# Patient Record
Sex: MALE | Race: BLACK OR AFRICAN AMERICAN | NOT HISPANIC OR LATINO | Employment: OTHER | URBAN - METROPOLITAN AREA
[De-identification: names, ages, dates, MRNs, and addresses within clinical notes are randomized per-mention and may not be internally consistent; named-entity substitution may affect disease eponyms.]

---

## 2017-01-01 ENCOUNTER — NURSE TRIAGE (OUTPATIENT)
Dept: ADMINISTRATIVE | Facility: CLINIC | Age: 67
End: 2017-01-01

## 2017-01-02 NOTE — TELEPHONE ENCOUNTER
"    Reason for Disposition   BP  < 120/80  (all triage questions negative)    Answer Assessment - Initial Assessment Questions  1. BLOOD PRESSURE: "What is the blood pressure?" "Did you take at least two measurements 5 minutes apart?"      /70s last 4-5 times BP checked   2. ONSET: "When did you take your blood pressure?"      10 mins ago   3. HOW: "How did you obtain the blood pressure?" (e.g., visiting nurse, automatic home BP monitor)      Home cuff   4. HISTORY: "Do you have a history of high blood pressure?"      Yes   5. MEDICATIONS: "Are you taking any medications for blood pressure?" "Have you missed any doses recently?"      Told not to med if top number >160   6. OTHER SYMPTOMS: "Do you have any symptoms?" (e.g., headache, chest pain, blurred vision, difficulty breathing, weakness)      No CP, no blurred vision, no HA, no SOB    Protocols used: ST HIGH BLOOD PRESSURE-A-      "

## 2017-01-17 ENCOUNTER — TELEPHONE (OUTPATIENT)
Dept: FAMILY MEDICINE | Facility: CLINIC | Age: 67
End: 2017-01-17

## 2017-01-17 NOTE — TELEPHONE ENCOUNTER
----- Message from Tomasz Garnett sent at 1/16/2017 12:53 PM CST -----  Contact: [phone]/self  Pt requesting to speak with the nurse.  Please advise

## 2017-01-17 NOTE — TELEPHONE ENCOUNTER
----- Message from Isadora Quiñones sent at 1/17/2017 11:13 AM CST -----  Contact: self/[phone]  Patient is returning your call.  Please advise

## 2017-01-17 NOTE — TELEPHONE ENCOUNTER
Returned patients call. Patient stated he has been having a bad cough. He is feeling better because he was able to cough up some of the mucus. I asked the patient if he would like to schedule an appointment, he stated he is feeling better and doesn't feel as though he needs to be seen.

## 2017-01-19 ENCOUNTER — TELEPHONE (OUTPATIENT)
Dept: FAMILY MEDICINE | Facility: CLINIC | Age: 67
End: 2017-01-19

## 2017-01-19 RX ORDER — LEVOFLOXACIN 250 MG/1
250 TABLET ORAL EVERY OTHER DAY
Qty: 5 TABLET | Refills: 0 | Status: SHIPPED | OUTPATIENT
Start: 2017-01-19 | End: 2017-01-28

## 2017-01-19 RX ORDER — BENZONATATE 100 MG/1
100 CAPSULE ORAL 3 TIMES DAILY PRN
Qty: 20 CAPSULE | Refills: 0 | Status: SHIPPED | OUTPATIENT
Start: 2017-01-19 | End: 2017-01-29

## 2017-01-19 NOTE — TELEPHONE ENCOUNTER
----- Message from Daphne Skinner sent at 1/19/2017 10:46 AM CST -----  Contact: SELF/[phone]  The cough has not improved so he is seeking further recommendations.

## 2017-02-06 ENCOUNTER — TELEPHONE (OUTPATIENT)
Dept: FAMILY MEDICINE | Facility: CLINIC | Age: 67
End: 2017-02-06

## 2017-02-06 NOTE — TELEPHONE ENCOUNTER
----- Message from Deanne Brock sent at 2/3/2017  8:14 AM CST -----  Contact: [phone]/ self   Pt requesting to speak with you in regarding his medications . Please advise

## 2017-02-07 ENCOUNTER — TELEPHONE (OUTPATIENT)
Dept: FAMILY MEDICINE | Facility: CLINIC | Age: 67
End: 2017-02-07

## 2017-02-07 NOTE — TELEPHONE ENCOUNTER
----- Message from Marifer Murillo sent at 2/7/2017  8:20 AM CST -----  Contact: [phone]  Please call [phone] /Pt returning your call

## 2017-02-07 NOTE — TELEPHONE ENCOUNTER
----- Message from Yanet Montaño sent at 2/6/2017  4:38 PM CST -----  Contact: self, [phone]  Patient called in requesting to speak with you, states he is coughing a lot.  Please advise.

## 2017-02-07 NOTE — TELEPHONE ENCOUNTER
Spoke with patient who states that he has been coughing again for the past 2 weeks. He states he has completed the antibiotics. Please advise.

## 2017-02-08 ENCOUNTER — OFFICE VISIT (OUTPATIENT)
Dept: FAMILY MEDICINE | Facility: CLINIC | Age: 67
End: 2017-02-08
Payer: MEDICARE

## 2017-02-08 ENCOUNTER — HOSPITAL ENCOUNTER (OUTPATIENT)
Dept: RADIOLOGY | Facility: HOSPITAL | Age: 67
Discharge: HOME | End: 2017-02-08
Attending: FAMILY MEDICINE
Payer: MEDICARE

## 2017-02-08 VITALS
OXYGEN SATURATION: 97 % | DIASTOLIC BLOOD PRESSURE: 68 MMHG | BODY MASS INDEX: 24.32 KG/M2 | HEIGHT: 71 IN | SYSTOLIC BLOOD PRESSURE: 114 MMHG | WEIGHT: 173.75 LBS | HEART RATE: 117 BPM

## 2017-02-08 DIAGNOSIS — R05.9 COUGH: ICD-10-CM

## 2017-02-08 DIAGNOSIS — E11.59 HYPERTENSION ASSOCIATED WITH DIABETES: ICD-10-CM

## 2017-02-08 DIAGNOSIS — Z99.2 ESRD ON PERITONEAL DIALYSIS: ICD-10-CM

## 2017-02-08 DIAGNOSIS — N18.6 ESRD ON PERITONEAL DIALYSIS: ICD-10-CM

## 2017-02-08 DIAGNOSIS — J90 PLEURAL EFFUSION: ICD-10-CM

## 2017-02-08 DIAGNOSIS — E11.22 TYPE 2 DIABETES MELLITUS WITH CHRONIC KIDNEY DISEASE ON CHRONIC DIALYSIS, WITHOUT LONG-TERM CURRENT USE OF INSULIN: ICD-10-CM

## 2017-02-08 DIAGNOSIS — N18.6 ESRD ON DIALYSIS: ICD-10-CM

## 2017-02-08 DIAGNOSIS — R07.9 RIGHT-SIDED CHEST PAIN: Primary | ICD-10-CM

## 2017-02-08 DIAGNOSIS — Z99.2 ESRD ON DIALYSIS: ICD-10-CM

## 2017-02-08 DIAGNOSIS — R07.9 RIGHT-SIDED CHEST PAIN: ICD-10-CM

## 2017-02-08 DIAGNOSIS — I15.2 HYPERTENSION ASSOCIATED WITH DIABETES: ICD-10-CM

## 2017-02-08 DIAGNOSIS — D63.8 ANEMIA OF CHRONIC DISEASE: ICD-10-CM

## 2017-02-08 DIAGNOSIS — E11.21 DIABETIC NEPHROPATHY ASSOCIATED WITH TYPE 2 DIABETES MELLITUS: ICD-10-CM

## 2017-02-08 DIAGNOSIS — I50.30 (HFPEF) HEART FAILURE WITH PRESERVED EJECTION FRACTION: ICD-10-CM

## 2017-02-08 DIAGNOSIS — N18.6 TYPE 2 DIABETES MELLITUS WITH CHRONIC KIDNEY DISEASE ON CHRONIC DIALYSIS, WITHOUT LONG-TERM CURRENT USE OF INSULIN: ICD-10-CM

## 2017-02-08 DIAGNOSIS — N18.6 ESRD (END STAGE RENAL DISEASE) ON DIALYSIS: ICD-10-CM

## 2017-02-08 DIAGNOSIS — E11.29 TYPE 2 DIABETES MELLITUS WITH OTHER KIDNEY COMPLICATION, UNSPECIFIED LONG TERM INSULIN USE STATUS: ICD-10-CM

## 2017-02-08 DIAGNOSIS — E11.69 DYSLIPIDEMIA ASSOCIATED WITH TYPE 2 DIABETES MELLITUS: ICD-10-CM

## 2017-02-08 DIAGNOSIS — N18.5 TYPE 2 DIABETES MELLITUS WITH STAGE 5 CHRONIC KIDNEY DISEASE NOT ON CHRONIC DIALYSIS, WITHOUT LONG-TERM CURRENT USE OF INSULIN: ICD-10-CM

## 2017-02-08 DIAGNOSIS — I25.10 CORONARY ARTERY DISEASE INVOLVING NATIVE CORONARY ARTERY OF NATIVE HEART WITHOUT ANGINA PECTORIS: ICD-10-CM

## 2017-02-08 DIAGNOSIS — Z99.2 ESRD (END STAGE RENAL DISEASE) ON DIALYSIS: ICD-10-CM

## 2017-02-08 DIAGNOSIS — E78.5 DYSLIPIDEMIA ASSOCIATED WITH TYPE 2 DIABETES MELLITUS: ICD-10-CM

## 2017-02-08 DIAGNOSIS — E11.22 TYPE 2 DIABETES MELLITUS WITH STAGE 5 CHRONIC KIDNEY DISEASE NOT ON CHRONIC DIALYSIS, WITHOUT LONG-TERM CURRENT USE OF INSULIN: ICD-10-CM

## 2017-02-08 DIAGNOSIS — Z99.2 TYPE 2 DIABETES MELLITUS WITH CHRONIC KIDNEY DISEASE ON CHRONIC DIALYSIS, WITHOUT LONG-TERM CURRENT USE OF INSULIN: ICD-10-CM

## 2017-02-08 DIAGNOSIS — N25.81 SECONDARY HYPERPARATHYROIDISM: ICD-10-CM

## 2017-02-08 DIAGNOSIS — I50.30 CHF WITH LEFT VENTRICULAR DIASTOLIC DYSFUNCTION, NYHA CLASS 1: ICD-10-CM

## 2017-02-08 PROCEDURE — 71020 XR CHEST PA AND LATERAL: CPT | Mod: 26,,, | Performed by: RADIOLOGY

## 2017-02-08 PROCEDURE — 99214 OFFICE O/P EST MOD 30 MIN: CPT | Mod: S$PBB,,, | Performed by: FAMILY MEDICINE

## 2017-02-08 PROCEDURE — 99999 PR PBB SHADOW E&M-EST. PATIENT-LVL III: CPT | Mod: PBBFAC,,, | Performed by: FAMILY MEDICINE

## 2017-02-08 PROCEDURE — 99213 OFFICE O/P EST LOW 20 MIN: CPT | Mod: PBBFAC,PO | Performed by: FAMILY MEDICINE

## 2017-02-08 PROCEDURE — 71020 XR CHEST PA AND LATERAL: CPT | Mod: TC

## 2017-02-08 RX ORDER — BENZONATATE 100 MG/1
100 CAPSULE ORAL 3 TIMES DAILY PRN
Qty: 30 CAPSULE | Refills: 3 | Status: ON HOLD | OUTPATIENT
Start: 2017-02-08 | End: 2017-02-13 | Stop reason: HOSPADM

## 2017-02-08 NOTE — PROGRESS NOTES
Subjective:       Patient ID: Terry Cote is a 66 y.o. male.    Chief Complaint: Follow-up and Back Pain (right side)    HPI Comments: 66 yr old pleasant black male with diabetes mellitus II, hypertension, hyperlipidemia, CVA left, ESRD on dialysis, diastolic dysfunction type 1, hyperuricemia, OA, presents today for his 3 month follow up and persisting cough and right chest discomfort. He completed antibiotics and taking tessalon perles as needed. No SOB.     1. ESRD - He is doing peritoneal dialysis. He established with nephrology. He is still making urine and has no new issues with that. He was seen twice prior to this visit for suspected stroke symptoms and weakness and discharged. He has generalized weakness and he did PT/OT and it helped. He has home health PT now.     2. DM II - chronic and currently controlled - follows endocrinology - no symptoms and issues    3. HTN - currently controlled - on CCB daily - no side effects      4. HLD - reports controlled - labs due - takes statin daily - no side effects    5. CVA left - he had stroke a while ago and he had very mild left sided weakness - he is on Aspirin since then - no problem ambulating    6. Diastolic heart failure - on medical management - EF normal - no symptoms of chest pain, SOB, or pedal edema    7. Constipation - chronic - tried some OTC meds and nothing works - he is on pain medication for rib pain and it is making it worse    History as below - reviewed         Diabetes   He presents for his follow-up diabetic visit. He has type 2 diabetes mellitus. His disease course has been stable. Pertinent negatives for hypoglycemia include no dizziness, headaches, hunger, nervousness/anxiousness, speech difficulty or sweats. Pertinent negatives for diabetes include no chest pain, no foot paresthesias, no foot ulcerations, no polyuria and no weakness. Pertinent negatives for hypoglycemia complications include no blackouts, no hospitalization, no  nocturnal hypoglycemia, no required assistance and no required glucagon injection. Symptoms are stable. Diabetic complications include a CVA, heart disease and nephropathy. Pertinent negatives for diabetic complications include no impotence, peripheral neuropathy, PVD or retinopathy. Risk factors for coronary artery disease include diabetes mellitus, dyslipidemia, hypertension, male sex and sedentary lifestyle. Current diabetic treatment includes insulin injections. He is compliant with treatment all of the time. He is following a generally healthy diet. Meal planning includes avoidance of concentrated sweets. He rarely participates in exercise. An ACE inhibitor/angiotensin II receptor blocker is not being taken. He does not see a podiatrist.Eye exam is not current.   Hypertension   This is a chronic problem. The current episode started more than 1 year ago. The problem has been gradually improving since onset. The problem is controlled. Pertinent negatives include no anxiety, chest pain, headaches, palpitations, peripheral edema, shortness of breath or sweats. There are no associated agents to hypertension. Risk factors for coronary artery disease include diabetes mellitus, dyslipidemia, male gender and sedentary lifestyle. Past treatments include diuretics and calcium channel blockers. The current treatment provides significant improvement. There are no compliance problems.  Hypertensive end-organ damage includes kidney disease, CVA and heart failure. There is no history of left ventricular hypertrophy, PVD, renovascular disease, retinopathy or a thyroid problem. Identifiable causes of hypertension include chronic renal disease. There is no history of coarctation of the aorta, hypercortisolism, hyperparathyroidism or pheochromocytoma.   Hyperlipidemia   This is a chronic problem. The current episode started more than 1 year ago. The problem is controlled. Recent lipid tests were reviewed and are normal.  Exacerbating diseases include chronic renal disease and diabetes. He has no history of hypothyroidism, liver disease, obesity or nephrotic syndrome. Pertinent negatives include no chest pain, myalgias or shortness of breath. Current antihyperlipidemic treatment includes statins. The current treatment provides significant improvement of lipids. There are no compliance problems.  Risk factors for coronary artery disease include diabetes mellitus, dyslipidemia, hypertension, male sex and a sedentary lifestyle.   Cough   This is a new problem. The current episode started 1 to 4 weeks ago. The problem has been gradually improving. The problem occurs every few hours. The cough is non-productive. Pertinent negatives include no chest pain, ear pain, headaches, myalgias, rash, rhinorrhea, shortness of breath, sweats or wheezing. Nothing aggravates the symptoms. He has tried prescription cough suppressant for the symptoms. The treatment provided significant relief. His past medical history is significant for environmental allergies. There is no history of asthma, bronchiectasis or bronchitis.     Review of Systems   Constitutional: Negative.  Negative for activity change, diaphoresis and unexpected weight change.   HENT: Negative.  Negative for congestion, ear pain, mouth sores, rhinorrhea and voice change.    Eyes: Negative.  Negative for pain, discharge and visual disturbance.   Respiratory: Positive for cough. Negative for apnea, shortness of breath and wheezing.    Cardiovascular: Negative.  Negative for chest pain and palpitations.   Gastrointestinal: Negative.  Negative for abdominal distention, anal bleeding, diarrhea and vomiting.   Endocrine: Negative.  Negative for cold intolerance and polyuria.   Genitourinary: Negative.  Negative for decreased urine volume, difficulty urinating, discharge, frequency, impotence and scrotal swelling.   Musculoskeletal: Negative.  Negative for back pain, myalgias and neck stiffness.    Skin: Negative.  Negative for color change and rash.   Allergic/Immunologic: Positive for environmental allergies. Negative for immunocompromised state.   Neurological: Negative.  Negative for dizziness, speech difficulty, weakness, light-headedness and headaches.   Hematological: Negative.    Psychiatric/Behavioral: Negative.  Negative for agitation, dysphoric mood and suicidal ideas. The patient is not nervous/anxious.        PMH/PSH/FH/SH/MED/ALLERGY reviewed    Objective:       Vitals:    02/08/17 1110   BP: 114/68   Pulse: (!) 117       Physical Exam   Constitutional: He is oriented to person, place, and time. He appears well-developed and well-nourished. No distress.   HENT:   Head: Normocephalic and atraumatic.   Right Ear: External ear normal.   Left Ear: External ear normal.   Nose: Nose normal.   Mouth/Throat: Oropharynx is clear and moist. No oropharyngeal exudate.   Eyes: Conjunctivae and EOM are normal. Pupils are equal, round, and reactive to light.   Neck: Normal range of motion. Neck supple. No JVD present. No tracheal deviation present. No thyromegaly present.   Cardiovascular: Normal rate, regular rhythm, normal heart sounds and intact distal pulses.  Exam reveals no gallop and no friction rub.    No murmur heard.  Pulmonary/Chest: Effort normal. No respiratory distress. He has decreased breath sounds in the right middle field and the right lower field. He has no wheezes. He has no rales. He exhibits no tenderness.   Abdominal: Soft. Bowel sounds are normal. He exhibits no distension and no mass. There is no tenderness. There is no rebound and no guarding.   PD catheter in place - site C/D/I   Musculoskeletal: Normal range of motion. He exhibits no edema or tenderness.   Lymphadenopathy:     He has no cervical adenopathy.   Neurological: He is alert and oriented to person, place, and time. He has normal reflexes. No cranial nerve deficit. He exhibits normal muscle tone. Coordination normal.    generalized weakness, 4/5 strength Left side and 5/5 on right   Skin: Skin is warm and dry. No rash noted. He is not diaphoretic. No erythema. No pallor.   Psychiatric: He has a normal mood and affect. His behavior is normal. Judgment and thought content normal.       Assessment:       1. Right-sided chest pain    2. Pleural effusion    3. Coronary artery disease involving native coronary artery of native heart without angina pectoris    4. Hypertension associated with diabetes    5. (HFpEF) heart failure with preserved ejection fraction    6. CHF with left ventricular diastolic dysfunction, NYHA class 1    7. ESRD (end stage renal disease) on dialysis    8. ESRD on dialysis    9. Diabetic nephropathy associated with type 2 diabetes mellitus    10. ESRD on peritoneal dialysis    11. Type 2 diabetes mellitus with other kidney complication, unspecified long term insulin use status    12. Type 2 diabetes mellitus with chronic kidney disease on chronic dialysis, without long-term current use of insulin    13. Anemia of chronic disease    14. Dyslipidemia associated with type 2 diabetes mellitus    15. Secondary hyperparathyroidism    16. Type 2 diabetes mellitus with stage 5 chronic kidney disease not on chronic dialysis, without long-term current use of insulin    17. Cough        Plan:       Terry was seen today for follow-up and back pain.    Diagnoses and all orders for this visit:    Right-sided chest pain  -     X-Ray Chest PA And Lateral; Future    Pleural effusion  -     X-Ray Chest PA And Lateral; Future    Coronary artery disease involving native coronary artery of native heart without angina pectoris  -     CBC auto differential; Future  -     Comprehensive metabolic panel; Future    Hypertension associated with diabetes  -     CBC auto differential; Future  -     Comprehensive metabolic panel; Future    (HFpEF) heart failure with preserved ejection fraction    CHF with left ventricular diastolic  dysfunction, NYHA class 1    ESRD (end stage renal disease) on dialysis  -     CBC auto differential; Future  -     Comprehensive metabolic panel; Future    ESRD on dialysis  -     CBC auto differential; Future  -     Comprehensive metabolic panel; Future    Diabetic nephropathy associated with type 2 diabetes mellitus    ESRD on peritoneal dialysis  -     CBC auto differential; Future  -     Comprehensive metabolic panel; Future    Type 2 diabetes mellitus with other kidney complication, unspecified long term insulin use status  -     CBC auto differential; Future  -     Comprehensive metabolic panel; Future  -     Hemoglobin A1c; Future    Type 2 diabetes mellitus with chronic kidney disease on chronic dialysis, without long-term current use of insulin  -     CBC auto differential; Future  -     Comprehensive metabolic panel; Future  -     Hemoglobin A1c; Future    Anemia of chronic disease    Dyslipidemia associated with type 2 diabetes mellitus    Secondary hyperparathyroidism    Type 2 diabetes mellitus with stage 5 chronic kidney disease not on chronic dialysis, without long-term current use of insulin    Cough  -     benzonatate (TESSALON) 100 MG capsule; Take 1 capsule (100 mg total) by mouth 3 (three) times daily as needed.  -     CBC auto differential; Future  -     Comprehensive metabolic panel; Future      Cough with right chest discomfort  -h/o P effusion  -CXR    DM II  -controlled  -diet    HTN  -controlled  -on CCB    HLD  -controlled    Anemia of CKD  -stable    ESRD  -on PD  -follows Nephrology Dr Eileen payne      Spent adequate time in obtaining history and explaining differentials, reviewing chart in detail    40 minutes spent during this visit of which greater than 50% devoted to face-face counseling and coordination of care regarding diagnosis and management plan      Return in about 3 months (around 5/8/2017), or if symptoms worsen or fail to improve.

## 2017-02-08 NOTE — MR AVS SNAPSHOT
[facility]  [address] [place] [address] [id number]  [place] [address]  Phone: [phone]  Fax: [phone]                  Terry Cote   2017 11:20 AM   Office Visit    Description:  Male : 1950   Provider:  Duncan Rincon MD   Department:  [facility]           Reason for Visit     Follow-up     Back Pain           Diagnoses this Visit        Comments    Right-sided chest pain    -  Primary     Pleural effusion         Coronary artery disease involving native coronary artery of native heart without angina pectoris         Hypertension associated with diabetes         (HFpEF) heart failure with preserved ejection fraction         CHF with left ventricular diastolic dysfunction, NYHA class 1         ESRD (end stage renal disease) on dialysis         ESRD on dialysis         Diabetic nephropathy associated with type 2 diabetes mellitus         ESRD on peritoneal dialysis         Type 2 diabetes mellitus with other kidney complication, unspecified long term insulin use status         Type 2 diabetes mellitus with chronic kidney disease on chronic dialysis, without long-term current use of insulin         Anemia of chronic disease         Dyslipidemia associated with type 2 diabetes mellitus         Secondary hyperparathyroidism         Type 2 diabetes mellitus with stage 5 chronic kidney disease not on chronic dialysis, without long-term current use of insulin         Cough                To Do List           Future Appointments        Provider Department Dept Phone    3/22/2017 9:00 AM Louisa Zuniga MD [facility] [phone]      Goals (5 Years of Data)     None      Follow-Up and Disposition     Return in about 3 months (around 2017), or if symptoms worsen or fail to improve.       These Medications        Disp Refills Start End    benzonatate (TESSALON) 100 MG capsule 30 capsule 3 2017    Take 1 capsule (100 mg total) by  mouth 3 (three) times daily as needed. - Oral    Pharmacy: "[facility] [address] [place] [address] [address]  [address] AT [facility]  #: [phone]         [facility] On Call     [facility] On Call Nurse Care Line - 24/7 Assistance  Registered nurses in the [facility] provide clinical advisement, health education, appointment booking, and other advisory services.  Call for this free service at [phone].             Medications           Message regarding Medications     Verify the changes and/or additions to your medication regime listed below are the same as discussed with your clinician today.  If any of these changes or additions are incorrect, please notify your healthcare provider.        START taking these NEW medications        Refills    benzonatate (TESSALON) 100 MG capsule 3    Sig: Take 1 capsule (100 mg total) by mouth 3 (three) times daily as needed.    Class: Normal    Route: Oral           Verify that the below list of medications is an accurate representation of the medications you are currently taking.  If none reported, the list may be blank. If incorrect, please contact your healthcare provider. Carry this list with you in case of emergency.           Current Medications     allopurinol (ZYLOPRIM) 100 MG tablet Take 1 tablet (100 mg total) by mouth once daily.    amlodipine (NORVASC) 5 MG tablet Take 1 tablet (5 mg total) by mouth once daily.    aspirin (ECOTRIN) 81 MG EC tablet Take 1 tablet (81 mg total) by mouth once daily.    clopidogrel (PLAVIX) 75 mg tablet Take 75 mg by mouth once daily.    docusate sodium (COLACE) 100 MG capsule Take 1 capsule (100 mg total) by mouth 2 (two) times daily as needed for Constipation.    EX-LAX, SENNOSIDES, ORAL Take by mouth as needed. As directed    gabapentin (NEURONTIN) 300 MG capsule Take 1 capsule (300 mg total) by mouth every evening.    insulin aspart protamine-insulin aspart (NOVOLOG 70/30) 100 unit/mL (70-30)  "InPn pen Inject 5 Units into the skin 3 (three) times daily with meals.    insulin glargine (LANTUS SOLOSTAR) 100 unit/mL (3 mL) InPn pen Inject 20 Units into the skin every evening.    metoprolol succinate (TOPROL-XL) 25 MG 24 hr tablet Take 1 tablet (25 mg total) by mouth once daily.    NYSTOP powder APPLY TO AFFECTED AREA Q 6 H TO SITE    ondansetron (ZOFRAN-ODT) 4 MG TbDL     ondansetron (ZOFRAN-ODT) 8 MG TbDL Take 1 tablet (8 mg total) by mouth every 8 (eight) hours as needed (for nausea or vomiting).    potassium chloride SA (K-DUR,KLOR-CON) 20 MEQ tablet TK 1 T PO ONCE  D    pravastatin (PRAVACHOL) 80 MG tablet Take 40 mg by mouth once daily.    senna-docusate 8.6-50 mg (PERICOLACE) 8.6-50 mg per tablet Take 1 tablet by mouth 2 (two) times daily.    vitamin renal formula, B-complex-vitamin c-folic acid, (NEPHROCAP) 1 mg Cap Take 1 capsule by mouth once daily.    benzonatate (TESSALON) 100 MG capsule Take 1 capsule (100 mg total) by mouth 3 (three) times daily as needed.           Clinical Reference Information           Your Vitals Were     BP Pulse Height Weight SpO2 BMI    114/68 (BP Location: Right arm, Patient Position: Sitting, BP Method: Automatic) 117 5' 11" (1.803 m) 78.8 kg (173 lb 11.6 oz) 97% 24.23 kg/m2      Blood Pressure          Most Recent Value    BP  114/68      Allergies as of 2/8/2017     No Known Allergies      Immunizations Administered on Date of Encounter - 2/8/2017     None      Orders Placed During Today's Visit     Future Labs/Procedures Expected by Expires    CBC auto differential  2/8/2017 4/9/2018    Comprehensive metabolic panel  2/8/2017 4/9/2018    Hemoglobin A1c  2/8/2017 4/9/2018    X-Ray Chest PA And Lateral  2/8/2017 2/8/2018      Maintenance Dialysis History     Start End Type Comments Center    [facility]/22/2015  [facility]            Current Dialysis Center Information     [facility] [address] Phone #:  [phone]    " Contact:  [place]  [address] Fax #:  [phone]            Language Assistance Services     ATTENTION: Language assistance services are available, free of charge. Please call [phone].      ATENCIÓN: Si sawyrela francisco, tiene a moody disposición servicios gratuitos de asistencia lingüística. Llame al [phone].     CHÚ Ý: N?u b?n nói Ti?ng Vi?t, có các d?ch v? h? tr? ngôn ng? mi?n phí dành cho b?n. G?i s? [phone].         [facility] complies with applicable Federal civil rights laws and does not discriminate on the basis of race, color, national origin, age, disability, or sex.

## 2017-02-08 NOTE — TELEPHONE ENCOUNTER
----- Message from Marifer Murillo sent at 2/8/2017  3:22 PM CST -----  Contact: [phone]  Pt would like a nurse call back/Please advise. X-ray result

## 2017-02-08 NOTE — TELEPHONE ENCOUNTER
X ray showed small opacity which appears to be either very minimal fluid vs flat area of lung - continue taking cough medicine as needed

## 2017-02-09 ENCOUNTER — TELEPHONE (OUTPATIENT)
Dept: FAMILY MEDICINE | Facility: CLINIC | Age: 67
End: 2017-02-09

## 2017-02-10 ENCOUNTER — TELEPHONE (OUTPATIENT)
Dept: FAMILY MEDICINE | Facility: CLINIC | Age: 67
End: 2017-02-10

## 2017-02-11 ENCOUNTER — HOSPITAL ENCOUNTER (INPATIENT)
Facility: HOSPITAL | Age: 67
LOS: 1 days | Discharge: HOME | DRG: 291 | End: 2017-02-13
Attending: EMERGENCY MEDICINE | Admitting: INTERNAL MEDICINE
Payer: MEDICARE

## 2017-02-11 DIAGNOSIS — Z79.4 TYPE 2 DIABETES MELLITUS WITH CHRONIC KIDNEY DISEASE ON CHRONIC DIALYSIS, WITH LONG-TERM CURRENT USE OF INSULIN: ICD-10-CM

## 2017-02-11 DIAGNOSIS — J90 PLEURAL EFFUSION, RIGHT: Primary | ICD-10-CM

## 2017-02-11 DIAGNOSIS — Z86.73 HISTORY OF STROKE: ICD-10-CM

## 2017-02-11 DIAGNOSIS — Z99.2 TYPE 2 DIABETES MELLITUS WITH CHRONIC KIDNEY DISEASE ON CHRONIC DIALYSIS, WITH LONG-TERM CURRENT USE OF INSULIN: ICD-10-CM

## 2017-02-11 DIAGNOSIS — I10 ESSENTIAL HYPERTENSION: ICD-10-CM

## 2017-02-11 DIAGNOSIS — Z99.2 ESRD ON PERITONEAL DIALYSIS: ICD-10-CM

## 2017-02-11 DIAGNOSIS — N18.6 ESRD ON PERITONEAL DIALYSIS: ICD-10-CM

## 2017-02-11 DIAGNOSIS — N18.6 TYPE 2 DIABETES MELLITUS WITH CHRONIC KIDNEY DISEASE ON CHRONIC DIALYSIS, WITH LONG-TERM CURRENT USE OF INSULIN: ICD-10-CM

## 2017-02-11 DIAGNOSIS — Z99.2 ESRD (END STAGE RENAL DISEASE) ON DIALYSIS: ICD-10-CM

## 2017-02-11 DIAGNOSIS — Z99.2 ESRD ON DIALYSIS: ICD-10-CM

## 2017-02-11 DIAGNOSIS — J18.9 PNEUMONIA OF RIGHT LOWER LOBE DUE TO INFECTIOUS ORGANISM: ICD-10-CM

## 2017-02-11 DIAGNOSIS — E78.2 MIXED HYPERLIPIDEMIA: Chronic | ICD-10-CM

## 2017-02-11 DIAGNOSIS — I50.30 (HFPEF) HEART FAILURE WITH PRESERVED EJECTION FRACTION: ICD-10-CM

## 2017-02-11 DIAGNOSIS — D63.8 ANEMIA OF CHRONIC DISEASE: ICD-10-CM

## 2017-02-11 DIAGNOSIS — E11.22 TYPE 2 DIABETES MELLITUS WITH CHRONIC KIDNEY DISEASE ON CHRONIC DIALYSIS, WITH LONG-TERM CURRENT USE OF INSULIN: ICD-10-CM

## 2017-02-11 DIAGNOSIS — N18.6 ESRD (END STAGE RENAL DISEASE) ON DIALYSIS: ICD-10-CM

## 2017-02-11 DIAGNOSIS — R07.9 RIGHT-SIDED CHEST PAIN: ICD-10-CM

## 2017-02-11 DIAGNOSIS — N18.6 ESRD ON DIALYSIS: ICD-10-CM

## 2017-02-11 LAB
ALBUMIN SERPL BCP-MCNC: 2.8 G/DL
ALP SERPL-CCNC: 97 U/L
ALT SERPL W/O P-5'-P-CCNC: 16 U/L
ANION GAP SERPL CALC-SCNC: 15 MMOL/L
ANISOCYTOSIS BLD QL SMEAR: SLIGHT
AST SERPL-CCNC: 21 U/L
BASOPHILS # BLD AUTO: 0.04 K/UL
BASOPHILS NFR BLD: 0.5 %
BILIRUB SERPL-MCNC: 0.3 MG/DL
BUN SERPL-MCNC: 17 MG/DL
CALCIUM SERPL-MCNC: 9.4 MG/DL
CHLORIDE SERPL-SCNC: 101 MMOL/L
CO2 SERPL-SCNC: 25 MMOL/L
CREAT SERPL-MCNC: 5.7 MG/DL
DIFFERENTIAL METHOD: ABNORMAL
EOSINOPHIL # BLD AUTO: 0.3 K/UL
EOSINOPHIL NFR BLD: 2.9 %
ERYTHROCYTE [DISTWIDTH] IN BLOOD BY AUTOMATED COUNT: 14.1 %
EST. GFR  (AFRICAN AMERICAN): 11 ML/MIN/1.73 M^2
EST. GFR  (NON AFRICAN AMERICAN): 9 ML/MIN/1.73 M^2
GLUCOSE SERPL-MCNC: 112 MG/DL
HCT VFR BLD AUTO: 41.7 %
HGB BLD-MCNC: 13.5 G/DL
INR PPP: 1.1
LACTATE SERPL-SCNC: 3.3 MMOL/L
LYMPHOCYTES # BLD AUTO: 1.6 K/UL
LYMPHOCYTES NFR BLD: 18.4 %
MAGNESIUM SERPL-MCNC: 1.6 MG/DL
MCH RBC QN AUTO: 26.7 PG
MCHC RBC AUTO-ENTMCNC: 32.4 %
MCV RBC AUTO: 82 FL
MONOCYTES # BLD AUTO: 0.5 K/UL
MONOCYTES NFR BLD: 5.2 %
NEUTROPHILS # BLD AUTO: 6.4 K/UL
NEUTROPHILS NFR BLD: 73 %
PHOSPHATE SERPL-MCNC: 2.5 MG/DL
PLATELET # BLD AUTO: 200 K/UL
PLATELET BLD QL SMEAR: ABNORMAL
PMV BLD AUTO: 9.9 FL
POTASSIUM SERPL-SCNC: 3.8 MMOL/L
PROT SERPL-MCNC: 8 G/DL
PROTHROMBIN TIME: 11.5 SEC
RBC # BLD AUTO: 5.06 M/UL
SODIUM SERPL-SCNC: 141 MMOL/L
TROPONIN I SERPL DL<=0.01 NG/ML-MCNC: 0.01 NG/ML
WBC # BLD AUTO: 8.86 K/UL

## 2017-02-11 PROCEDURE — 96365 THER/PROPH/DIAG IV INF INIT: CPT

## 2017-02-11 PROCEDURE — 80053 COMPREHEN METABOLIC PANEL: CPT

## 2017-02-11 PROCEDURE — 87040 BLOOD CULTURE FOR BACTERIA: CPT | Mod: 59

## 2017-02-11 PROCEDURE — 93010 ELECTROCARDIOGRAM REPORT: CPT | Mod: ,,, | Performed by: INTERNAL MEDICINE

## 2017-02-11 PROCEDURE — 85025 COMPLETE CBC W/AUTO DIFF WBC: CPT

## 2017-02-11 PROCEDURE — 84484 ASSAY OF TROPONIN QUANT: CPT

## 2017-02-11 PROCEDURE — 84100 ASSAY OF PHOSPHORUS: CPT

## 2017-02-11 PROCEDURE — 63600175 PHARM REV CODE 636 W HCPCS: Performed by: EMERGENCY MEDICINE

## 2017-02-11 PROCEDURE — 83605 ASSAY OF LACTIC ACID: CPT

## 2017-02-11 PROCEDURE — 99285 EMERGENCY DEPT VISIT HI MDM: CPT | Mod: 25

## 2017-02-11 PROCEDURE — 85610 PROTHROMBIN TIME: CPT

## 2017-02-11 PROCEDURE — 25000003 PHARM REV CODE 250: Performed by: EMERGENCY MEDICINE

## 2017-02-11 PROCEDURE — 83735 ASSAY OF MAGNESIUM: CPT

## 2017-02-11 PROCEDURE — 93005 ELECTROCARDIOGRAM TRACING: CPT

## 2017-02-11 RX ORDER — HYDROCODONE BITARTRATE AND ACETAMINOPHEN 5; 325 MG/1; MG/1
1 TABLET ORAL
Status: COMPLETED | OUTPATIENT
Start: 2017-02-11 | End: 2017-02-11

## 2017-02-11 RX ORDER — CEFTRIAXONE 1 G/1
1 INJECTION, POWDER, FOR SOLUTION INTRAMUSCULAR; INTRAVENOUS
Status: DISCONTINUED | OUTPATIENT
Start: 2017-02-11 | End: 2017-02-11

## 2017-02-11 RX ADMIN — HYDROCODONE BITARTRATE AND ACETAMINOPHEN 1 TABLET: 5; 325 TABLET ORAL at 09:02

## 2017-02-11 RX ADMIN — CEFTRIAXONE 1 G: 1 INJECTION, SOLUTION INTRAVENOUS at 10:02

## 2017-02-11 NOTE — IP AVS SNAPSHOT
[facility]  [address]  [place] [address]  Phone: [phone]           Patient Discharge Instructions     Our goal is to set you up for success. This packet includes information on your condition, medications, and your home care. It will help you to care for yourself so you don't get sicker and need to go back to the hospital.     Please ask your nurse if you have any questions.        There are many details to remember when preparing to leave the hospital. Here is what you will need to do:    1. Take your medicine. If you are prescribed medications, review your Medication List in the following pages. You may have new medications to  at the pharmacy and others that you'll need to stop taking. Review the instructions for how and when to take your medications. Talk with your doctor or nurses if you are unsure of what to do.     2. Go to your follow-up appointments. Specific follow-up information is listed in the following pages. Your may be contacted by a transition nurse or clinical provider about future appointments. Be sure we have all of the phone numbers to reach you, if needed. Please contact your provider's office if you are unable to make an appointment.     3. Watch for warning signs. Your doctor or nurse will give you detailed warning signs to watch for and when to call for assistance. These instructions may also include educational information about your condition. If you experience any of warning signs to your health, call your doctor.               ** Verify the list of medication(s) below is accurate and up to date. Carry this with you in case of emergency. If your medications have changed, please notify your healthcare provider.             Medication List      START taking these medications        Additional Info                      gentamicin 0.1 % cream   Commonly known as:  GARAMYCIN   Quantity:  30 g   Refills:  6    Instructions:  Apply topically 2 (two) times  daily. To PD cath site     Begin Date    AM    Noon    PM    Bedtime       lisinopril 10 MG tablet   Quantity:  90 tablet   Refills:  3   Dose:  10 mg    Start Date:  2/14/2017   Instructions:  Take 1 tablet (10 mg total) by mouth once daily.     Begin Date    AM    Noon    PM    Bedtime         CHANGE how you take these medications        Additional Info                      gabapentin 300 MG capsule   Commonly known as:  NEURONTIN   Quantity:  30 capsule   Refills:  3   Dose:  300 mg   What changed:  when to take this    Last time this was given:  300 mg on 2/12/2017  8:57 PM   Instructions:  Take 1 capsule (300 mg total) by mouth every evening.     Begin Date    AM    Noon    PM    Bedtime       insulin aspart protamine-insulin aspart 100 unit/mL (70-30) Inpn pen   Commonly known as:  NovoLOG 70/30   Quantity:  3 mL   Refills:  11   Dose:  5 Units   What changed:  how much to take    Instructions:  Inject 5 Units into the skin 3 (three) times daily with meals.     Begin Date    AM    Noon    PM    Bedtime       ondansetron 4 MG Tbdl   Commonly known as:  ZOFRAN-ODT   Refills:  1   What changed:  Another medication with the same name was removed. Continue taking this medication, and follow the directions you see here.      Begin Date    AM    Noon    PM    Bedtime         CONTINUE taking these medications        Additional Info                      allopurinol 100 MG tablet   Commonly known as:  ZYLOPRIM   Quantity:  30 tablet   Refills:  1   Dose:  100 mg    Last time this was given:  100 mg on 2/13/2017  9:18 AM   Instructions:  Take 1 tablet (100 mg total) by mouth once daily.     Begin Date    AM    Noon    PM    Bedtime       aspirin 81 MG EC tablet   Commonly known as:  ECOTRIN   Quantity:  30 tablet   Refills:  3   Dose:  81 mg    Last time this was given:  81 mg on 2/12/2017  9:28 AM   Instructions:  Take 1 tablet (81 mg total) by mouth once daily.     Begin Date    AM    Noon    PM    Bedtime        clopidogrel 75 mg tablet   Commonly known as:  PLAVIX   Refills:  0   Dose:  75 mg    Last time this was given:  75 mg on 2/12/2017  9:28 AM   Instructions:  Take 75 mg by mouth once daily.     Begin Date    AM    Noon    PM    Bedtime       docusate sodium 100 MG capsule   Commonly known as:  COLACE   Quantity:  100 capsule   Refills:  0   Dose:  100 mg    Instructions:  Take 1 capsule (100 mg total) by mouth 2 (two) times daily as needed for Constipation.     Begin Date    AM    Noon    PM    Bedtime       EX-LAX (SENNOSIDES) ORAL   Refills:  0    Instructions:  Take by mouth as needed. As directed     Begin Date    AM    Noon    PM    Bedtime       insulin glargine 100 unit/mL (3 mL) Inpn pen   Commonly known as:  LANTUS SOLOSTAR   Quantity:  31 Syringe   Refills:  6   Dose:  20 Units   Comments:  Please dispense with 31 Lantus pen needles with 6 refills.     Instructions:  Inject 20 Units into the skin every evening.     Begin Date    AM    Noon    PM    Bedtime       metoprolol succinate 25 MG 24 hr tablet   Commonly known as:  TOPROL-XL   Quantity:  15 tablet   Refills:  11   Dose:  25 mg    Last time this was given:  25 mg on 2/13/2017  9:18 AM   Instructions:  Take 1 tablet (25 mg total) by mouth once daily.     Begin Date    AM    Noon    PM    Bedtime       NYSTOP powder   Refills:  0   Generic drug:  nystatin    Instructions:  APPLY TO AFFECTED AREA Q 6 H TO SITE     Begin Date    AM    Noon    PM    Bedtime       potassium chloride SA 20 MEQ tablet   Commonly known as:  K-DUR,KLOR-CON   Refills:  2    Instructions:  TK 1 T PO ONCE  D     Begin Date    AM    Noon    PM    Bedtime       pravastatin 80 MG tablet   Commonly known as:  PRAVACHOL   Refills:  0   Dose:  40 mg    Last time this was given:  40 mg on 2/12/2017  9:28 AM   Instructions:  Take 40 mg by mouth once daily.     Begin Date    AM    Noon    PM    Bedtime       senna-docusate 8.6-50 mg 8.6-50 mg per tablet   Commonly known as:  PERICOLACE    Quantity:  30 tablet   Refills:  0   Dose:  1 tablet    Last time this was given:  1 tablet on 2/12/2017  8:57 PM   Instructions:  Take 1 tablet by mouth 2 (two) times daily.     Begin Date    AM    Noon    PM    Bedtime       vitamin renal formula (B-complex-vitamin c-folic acid) 1 mg Cap   Commonly known as:  NEPHROCAP   Quantity:  90 capsule   Refills:  1   Dose:  1 capsule    Last time this was given:  1 capsule on 2/12/2017  9:28 AM   Instructions:  Take 1 capsule by mouth once daily.     Begin Date    AM    Noon    PM    Bedtime         STOP taking these medications     amlodipine 5 MG tablet   Commonly known as:  NORVASC       benzonatate 100 MG capsule   Commonly known as:  TESSALON            Where to Get Your Medications      You can get these medications from any pharmacy     Bring a paper prescription for each of these medications     gentamicin 0.1 % cream    lisinopril 10 MG tablet                  Please bring to all follow up appointments:    1. A copy of your discharge instructions.  2. All medicines you are currently taking in their original bottles.  3. Identification and insurance card.    Please arrive 15 minutes ahead of scheduled appointment time.    Please call 24 hours in advance if you must reschedule your appointment and/or time.        Your Scheduled Appointments     Mar 22, 2017  9:00 AM CDT   Established Patient Visit with Louisa Zuniga MD   [facility] General Surgery ([place])    [address]  4th Floor [facility] [address] [phone]              Follow-up Information     Follow up with Duncan Rincon MD In 1 week.    Specialty:  Internal Medicine    Why:  message left for follow up within a week    Contact information:    [address]  [address]  [facility] [id number] [phone]          Follow up with Eileen Hand MD In 2 weeks.    Specialty:  Nephrology    Why:  For follow up from recent hospitalization    Contact information:    [address]  [place] [address]  [phone]          Follow up with Josselyn Lopes MD.    Specialty:  Pulmonary Disease    Why:  For follow up from recent hospitalization; message left for follow up    Contact information:    [address]  [address]  [place] [id number] [phone]          Discharge Instructions     Future Orders    Activity as tolerated     Call MD for:  difficulty breathing or increased cough     Call MD for:  increased confusion or weakness     Call MD for:  persistent dizziness, light-headedness, or visual disturbances     Call MD for:  persistent nausea and vomiting or diarrhea     Call MD for:  redness, tenderness, or signs of infection (pain, swelling, redness, odor or green/yellow discharge around incision site)     Call MD for:  severe persistent headache     Call MD for:  severe uncontrolled pain     Call MD for:  temperature >100.4     Call MD for:  worsening rash     Diet Cardiac     Diet Diabetic 2200 Calories       Discharge References/Attachments     HEART FAILURE, DISCHARGE INSTRUCTIONS FOR (ENGLISH)    HEART FAILURE: WARNING SIGNS OF A FLARE-UP (ENGLISH)    HEART FAILURE: TRACKING YOUR WEIGHT (ENGLISH)    HEART FAILURE: MAKING CHANGES TO YOUR DIET (ENGLISH)    PD CATHETER AND EXIT SITE, CARING FOR YOUR (ENGLISH)    GENTAMICIN SKIN CREAM OR OINTMENT (ENGLISH)    LISINOPRIL TABLETS (ENGLISH)        Primary Diagnosis     Your primary diagnosis was:  Fluid In Pleural Cavity      Admission Information     Date & Time Provider Department CSN    2/11/2017  8:53 PM Debby Noble MD [facility] [id number]      Care Providers     Provider Role Specialty Primary office phone    Debby Noble MD Attending Provider Internal Medicine [phone]    Eileen Hand MD Consulting Physician  Nephrology [phone]    Jerord Hsieh MD Consulting Physician  Diagnostic Radiology [phone]      Your Vitals Were     BP Pulse Temp Resp Height Weight    138/69 89  "97.9 °F (36.6 °C) (Oral) 20 5' 11" (1.803 m) 81.2 kg (179 lb 1.6 oz)    SpO2 BMI             99% 24.98 kg/m2         Recent Lab Values        4/22/2015 7/23/2015 11/6/2015 1/4/2016 4/4/2016 6/7/2016 11/4/2016 2/8/2017      7:48 AM  6:05 AM  4:05 PM  9:16 AM  4:09 PM 12:18 PM  3:02 AM 11:41 AM    A1C 7.3 (H) 7.3 (H) 6.1 6.0 6.1 6.4 (H) 6.0 5.5    Comment for A1C at  3:02 AM on 11/4/2016:  According to ADA guidelines, hemoglobin A1C <7.0% represents  optimal control in non-pregnant diabetic patients.  Different  metrics may apply to specific populations.   Standards of Medical Care in Diabetes - 2016.  For the purpose of screening for the presence of diabetes:  <5.7%     Consistent with the absence of diabetes  5.7-6.4%  Consistent with increasing risk for diabetes   (prediabetes)  >or=6.5%  Consistent with diabetes  Currently no consensus exists for use of hemoglobin A1C  for diagnosis of diabetes for children.      Comment for A1C at 11:41 AM on 2/8/2017:  According to ADA guidelines, hemoglobin A1C <7.0% represents  optimal control in non-pregnant diabetic patients.  Different  metrics may apply to specific populations.   Standards of Medical Care in Diabetes - 2016.  For the purpose of screening for the presence of diabetes:  <5.7%     Consistent with the absence of diabetes  5.7-6.4%  Consistent with increasing risk for diabetes   (prediabetes)  >or=6.5%  Consistent with diabetes  Currently no consensus exists for use of hemoglobin A1C  for diagnosis of diabetes for children.        Pending Labs     Order Current Status    Cytology Specimen-Medical Cytology (Fluid/Wash/Brush) Collected (02/13/17 1349)    Culture, Fluid  (Aerobic) In process    Albumin, Body Fluid (Reference Lab or Non-[facility]) [facility]; Pleural Fluid, Right Preliminary result    Blood culture #1 Preliminary result    Blood culture #2 Preliminary result    Culture, Fluid  (Aerobic) Preliminary result    Glucose, Body Fluid (Reference Lab or " Non[facility]) [facility]; Pleural Fluid, Right Preliminary result    LDH, Body Fluid ([facility] or [facility]) [facility]; Pleural Fluid, Right Preliminary result    Protein, Body Fluid (Reference [facility] or Non[facility]) [facility]; Pleural Fluid, Right Preliminary result      Allergies as of 2/13/2017     No Known Allergies      [facility]     [facility] Nurse Care Line - 24/7 Assistance  Unless otherwise directed by your provider, please contact [facility], our nurse care line that is available for 24/7 assistance.     Registered nurses in the [facility] provide clinical advisement, health education, appointment booking, and other advisory services.  Call for this free service at [phone].        Advance Directives     An advance directive is a document which, in the event you are no longer able to make decisions for yourself, tells your healthcare team what kind of treatment you do or do not want to receive, or who you would like to make those decisions for you.  If you do not currently have an advance directive, [facility] encourages you to create one.  For more information call:  [phone]), [phone] ([phone]),  or log on to [email or web address].        Language Assistance Services     ATTENTION: Language assistance services are available, free of charge. Please call [phone].      ATENCIÓN: Si habla español, tiene a moody disposición servicios gratuitos de asistencia lingüística. Llame al [phone].     CHÚ Ý: N?u b?n nói Ti?ng Vi?t, có các d?ch v? h? tr? ngôn ng? mi?n phí dành cho b?n. G?i s? [phone].        Stroke Education              Heart Failure Education       Heart Failure: Being Active  You have a condition called heart failure. Being active doesnt mean that you have to wear yourself out. Even a little movement each day helps to strengthen your heart. If you cant get out to exercise, you can do simple stretching and strengthening  exercises at home. These are good ways to keep you well-conditioned and prevent you and your heart from becoming excessively weak.    Ideas to get you started  · Add a little movement to things you do now. Walk to mail letters. Park your car at the far end of the parking lot and walk to the store. Walk up a flight of stairs instead of taking the elevator.  · Choose activities you enjoy. You might walk, swim, or ride an exercise bike. Things like gardening and washing the car count, too. Other possibilities include: washing dishes, walking the dog, walking around the mall, and doing aerobic activities with friends.  · Join a group exercise program at a [facility], a senior center, or a community center. Or look into a hospital cardiac rehabilitation program. Ask your doctor if you qualify.  Tips to keep you going  · Get up and get dressed each day. Go to a coffee shop and read a newspaper or go somewhere that you'll be in the presence of other active people. Youll feel more like being active.  · Make a plan. Choose one or more activities that you enjoy and that you can easily do. Then plan to do at least one each day. You might write your plan on a calendar.  · Go with a friend or a group if you like company. This can help you feel supported and stay motivated, too.  · Plan social events that you enjoy. This will keep you mentally engaged as well as physically motivated to do things you find pleasure in.  For your safety  · Talk with your healthcare provider before starting an exercise program.  · Exercise indoors when its too hot or too cold outside, or when the air quality is poor. Try walking at a shopping mall.  · Wear socks and sturdy shoes to maintain your balance and prevent falls.  · Start slowly. Do a few minutes several times a day at first. Increase your time and speed little by little.  · Stop and rest whenever you feel tired or get short of breath.  · Dont push yourself on days when you dont feel  well.  Date Last Reviewed: 3/20/2016  © 1242-8634 [employer]. [address] [place] [address], [place] [address]. All rights reserved. This information is not intended as a substitute for professional medical care. Always follow your healthcare professional's instructions.              Heart Failure: Evaluating Your Heart  You have a condition called heart failure. To evaluate your condition, your doctor will examine you, ask questions, and do some tests. Along with looking for signs of heart failure, the doctor looks for any other health problems that may have led to heart failure. The results of your evaluation will help your doctor form a treatment plan.  Health history and physical exam  Your visit will start with a health history. Tell the doctor about any symptoms youve noticed and about all medicines you take. Then youll have a physical exam. This includes listening to your heartbeat and breathing. Youll also be checked for swelling (edema) in your legs and neck. When you have fluid buildup or fluid in the lungs, it may be called congestive heart failure.  Diagnosing heart failure     During an echocardiogram, sound waves bounce off the heart. These are converted into a picture on the screen.   The following may be done to help your doctor form a diagnosis:  · X-rays show the size and shape of your heart. These pictures can also show fluid in your lungs.  · An electrocardiogram (ECG or EKG) shows the pattern of your heartbeat. Small pads (electrodes) are placed on your chest, arms, and legs. Wires connect the pads to the ECG machine, which records your hearts electrical signals. This can give the doctor information about heart function.  · An echocardiogram uses ultrasound waves to show the structure and movement of your heart muscle. This shows how well the heart pumps. It also shows the thickness of the heart walls, and if the heart is enlarged. It is one of the most useful, non-invasive tests as  it provides information about the heart's general function. This helps your doctor make treatment decisions.  · Lab tests evaluate small amounts of blood or urine for signs of problems. A BNP lab test can help diagnose and evaluate heart failure. BNP stands for B-type natriuretic peptide. The ventricles secrete more BNP when heart failure worsens. Lab tests can also provide information about metabolic dysfunction or heart dysfunction.  Your treatment plan  Based on the results of your evaluation and tests, your doctor will develop a treatment plan. This plan is designed to relieve some of your heart failure symptoms and help make you more comfortable. Your treatment plan may include:  · Medicine to help your heart work better and improve your quality of life  · Changes in what you eat and drink to help prevent fluid from backing up in your body  · Daily monitoring of your weight and heart failure symptoms to see how well your treatment plan is working  · Exercise to help you stay healthy  · Help with quitting smoking  · Emotional and psychological support to help adjust to the changes  · Referrals to other specialists to make sure you are being treated comprehensively  Date Last Reviewed: 3/21/2016  © 5046-6323 [employer]. [address] [place] [address]. All rights reserved. This information is not intended as a substitute for professional medical care. Always follow your healthcare professional's instructions.              Heart Failure: Making Changes to Your Diet  You have a condition called heart failure. When you have heart failure, excess fluid is more likely to build up in your body because your heart isn't working well. This makes the heart work harder to pump blood. Fluid buildup causes symptoms such as shortness of breath and swelling (edema). This is often referred to as congestive heart failure or CHF. Controlling the amount of salt (sodium) you eat may help stop fluid from  building up. Your doctor may also tell you to reduce the amount of fluid you drink.  Reading food labels    Your healthcare provider will tell you how much sodium you can eat each day. Read food labels to keep track. Keep in mind that certain foods are high in salt. These include canned, frozen, and processed foods. Check the amount of sodium in each serving. Watch out for high-sodium ingredients. These include MSG (monosodium glutamate), baking soda, and sodium phosphate.   Eating less salt  Give yourself time to get used to eating less salt. It may take a little while. Here are some tips to help:  · Take the saltshaker off the table. Replace it with salt-free herb mixes and spices.  · Eat fresh or plain frozen vegetables. These have much less salt than canned vegetables.  · Choose low-sodium snacks like sodium-free pretzels, crackers, or air-popped popcorn.  · Dont add salt to your food when youre cooking. Instead, season your foods with pepper, lemon, garlic, or onion.  · When you eat out, ask that your food be cooked without added salt.  · Avoid eating fried foods as these often have a great deal of salt.  If youre told to limit fluids  You may need to limit how much fluid you have to help prevent swelling. This includes anything that is liquid at room temperature, such as ice cream and soup. If your doctor tells you to limit fluid, try these tips:  · Measure drinks in a measuring cup before you drink them. This will help you meet daily goals.  · Chill drinks to make them more refreshing.  · Suck on frozen lemon wedges to quench thirst.  · Only drink when youre thirsty.  · Chew sugarless gum or suck on hard candy to keep your mouth moist.  · Weigh yourself daily to know if your body's fluid content is rising.  My sodium goal  Your healthcare provider may give you a sodium goal to meet each day. This includes sodium found in food as well as salt that you add. My goal is to eat no more than ___________ mg of  sodium per day.     When to call your doctor  Call your doctor right away if you have any symptoms of worsening heart failure. These can include:  · Sudden weight gain  · Increased swelling of your legs or ankles  · Trouble breathing when youre resting or at night  · Increase in the number of pillows you have to sleep on  · Chest pain, pressure, discomfort, or pain in the jaw, neck, or back   Date Last Reviewed: 3/21/2016  © 0182-3589 [employer]. [address] [place] [address]. All rights reserved. This information is not intended as a substitute for professional medical care. Always follow your healthcare professional's instructions.              Heart Failure: Medicines to Help Your Heart    You have a condition called heart failure (also known as congestive heart failure, or CHF). Your doctor will likely prescribe medicines for heart failure and any underlying health problems you have. Most heart failure patients take one or more types of medicinen. Your healthcare provider will work to find the combination of medicines that works best for you.  Heart failure medicines  Here are the most common heart failure medicines:  · ACE inhibitors lower blood pressure and decrease strain on the heart. This makes it easier for the heart to pump. Angiotensin receptor blockers have similar effects. These are prescribed for some patients instead of ACE inhibitors.  · Beta-blockers relieve stress on the heart. They also improve symptoms. They may also improve the heart's pumping action over time.  · Diuretics (also called water pills) help rid your body of excess water. This can help rid your body of swelling (edema). Having less fluid to pump means your heart doesnt have to work as hard. Some diuretics make your body lose a mineral called potassium. Your doctor will tell you if you need to take supplements or eat more foods high in potassium.  · Digoxin helps your heart pump with more strength.  This helps your heart pump more blood with each beat. So, more oxygen-rich blood travels to the rest of the body.  · Aldosterone antagonists help alter hormones and decrease strain on the heart.  · Hydralazine and nitrates are two separate medicines used together to treat heart failure. They may come in one combination pill. They lower blood pressure and decrease how hard the heart has to pump.  Medicines for related conditions  Controlling other heart problems helps keep heart failure under control, too. Depending on other heart problems you have, medicines may be prescribed to:  · Lower blood pressure (antihypertensives).  · Lower cholesterol levels (statins).  · Prevent blood clots (anticoagulants or aspirin).  · Keep the heartbeat steady (antiarrhythmics).  Date Last Reviewed: 3/5/2016  © 2316-1282 [employer]. [address] [place] [address], [place] [address]. All rights reserved. This information is not intended as a substitute for professional medical care. Always follow your healthcare professional's instructions.              Heart Failure: Procedures That May Help    The heart is a muscle that pumps oxygen-rich blood to all parts of the body. When you have heart failure, the heart is not able to pump as well as it should. Blood and fluid may back up into the lungs (congestive heart failure), and some parts of the body dont get enough oxygen-rich blood to work normally. These problems lead to the symptoms of heart failure.     Certain procedures may help the heart pump better in some cases of heart failure. Some procedures are done to treat health problems that may have caused the heart failure such as coronary artery disease or heart rhythm problems. For more serious heart failure, other options are available.  Treating artery and valve problems  If you have coronary artery disease or valve disease, procedures may be done to improve blood flow. This helps the heart pump better, which can improve  heart failure symptoms. First, your doctor may do a cardiac catheterization to help detect clogged blood vessels or valve damage. During this procedure, a  thin tube (catheter) in inserted into a blood vessel and guided to the heart. There a dye is injected and a special type of X-ray (angiogram) is taken of the blood vessels. Procedures to open a blocked artery or fix damaged valves can also be done using catheterization.  · Angioplasty uses a balloon-tipped instrument at the end of the catheter. The balloon is inflated to widen the narrowed artery. In many cases, a stent is expanded to further support the narrowed artery. A stent is a metal mesh tube.  · Valve surgery repairs or replacement of faulty valves can also be done during catheterization so blood can flow properly through the chambers of the heart.  Bypass surgery is another option to help treat blocked arteries. It uses a healthy blood vessel from elsewhere in the body. The healthy blood vessel is attached above and below the blocked area so that blood can flow around the blocked artery.  Treating heart rhythm problems  A device may be placed in the chest to help a weak heart maintain a healthy, heartbeat so the heart can pump more effectively:  · Pacemaker. A pacemaker is an implanted device that regulates your heartbeat electronically. It monitors your heart's rhythm and generates a painless electric impulse that helps the heart beat in a regular rhythm. A pacemaker is programmed to meet your specific heart rhythm needs.  · Biventricular pacing/cardiac resynchronization therapy. A type of pacemaker that paces both pumping chambers of the heart at the same time to coordinate contractions and to improve the heart's function. Some people with heart failure are candidates for this therapy.  · Implantable cardioverter defibrillator. A device similar to a pacemaker that senses when the heart is beating too fast and delivers an electrical shock to convert the  fast rhythm to a normal rhythm. This can be a life saving device.  In severe cases  In more serious cases of heart failure when other treatments no longer work, other options may include:  · Ventricular assist devices (VADs). These are mechanical devices used to take over the pumping function for one or both of the heart's ventricles, or pumping chambers. A VAD may be necessary when heart failure progresses to the point that medicines and other treatments no longer help. In some cases, a VAD may be used as a bridge to transplant.  · Heart transplant. This is replacing the diseased heart with a healthy one from a donor. This is an option for a few people who are very sick. A heart transplant is very serious and not an option for all patients. Your doctor can tell you more.  Date Last Reviewed: 3/20/2016  © 8145-6880 [employer]. [address] [place] [address]. All rights reserved. This information is not intended as a substitute for professional medical care. Always follow your healthcare professional's instructions.              Heart Failure: Tracking Your Weight  You have a condition called heart failure. When you have heart failure, a sudden weight gain or a steady rise in weight is a warning sign that your body is retaining too much water and salt. This could mean your heart failure is getting worse. If left untreated, it can cause problems for your lungs and result in shortness of breath. Weighing yourself each day is the best way to know if youre retaining water. If your weight goes up quickly, call your doctor. You will be given instructions on how to get rid of the excess water. You will likely need medicines and to avoid salt. This will help your heart work better.  Call your doctor if you gain more than 2 pounds in 1 day, more than 5 pounds in 1 week, or whatever weight gain you were told to report by your doctor. This is often a sign of worsening heart failure and needs to be  evaluated and treated. Your doctor will tell you what to do next.   Tips for weighing yourself    · Weigh yourself at the same time each morning, wearing the same clothes. Weigh yourself after urinating and before eating.  · Use the same scale each day. Make sure the numbers are easy to read. Put the scale on a flat, hard surface -- not on a rug or carpet.  · Do not stop weighing yourself. If you forget one day, weigh again the next morning.  How to use your weight chart  · Keep your weight chart near the scale. Write your weight on the chart as soon as you get off the scale.  · Fill in the month and the start date on the chart. Then write down your weight each day. Your chart will look like this:    · If you miss a day, leave the space blank. Weigh yourself the next day and write your weight in the next space.  · Take your weight chart with you when you go to see your doctor.  Date Last Reviewed: 3/20/2016  © 4792-1461 [employer]. [address] [place] [address]. All rights reserved. This information is not intended as a substitute for professional medical care. Always follow your healthcare professional's instructions.              Heart Failure: Warning Signs of a Flare-Up  You have a condition called heart failure. Once you have heart failure, flare-ups can happen. Below are signs that can mean your heart failure is getting worse. If you notice any of these warning signs, call your healthcare provider.  Swelling    · Your feet, ankles, or lower legs get puffier.  · You notice skin changes on your lower legs.  · Your shoes feel too tight.  · Your clothes are tighter in the waist.  · You have trouble getting rings on or off your fingers.  Shortness of breath  · You have to breathe harder even when youre doing your normal activities or when youre resting.  · You are short of breath walking up stairs or even short distances.  · You wake up at night short of breath or coughing.  · You need  to use more pillows or sit up to sleep.  · You wake up tired or restless.  Other warning signs  · You feel weaker, dizzy, or more tired.  · You have chest pain or changes in your heartbeat.  · You have a cough that wont go away.  · You cant remember things or dont feel like eating.  Tracking your weight  Gaining weight is often the first warning sign that heart failure is getting worse. Gaining even a few pounds can be a sign that your body is retaining excess water and salt. Weighing yourself each day in the morning after you urinate and before you eat, is the best way to know if you're retaining water. Get a scale that is easy to read and make sure you wear the same clothes and use the same scale every time you weigh. Your healthcare provider will show you how to track your weight. Call your doctor if you gain more than 2 pounds in 1 day, 5 pounds in 1 week, or whatever weight gain you were told to report by your doctor. This is often a sign of worsening heart failure and needs to be evaluated and treated before it compromises your breathing. Your doctor will tell you what to do next.    Date Last Reviewed: 3/15/2016  © 8877-4420 [employer]. [address] [place] [address], [place] [address]. All rights reserved. This information is not intended as a substitute for professional medical care. Always follow your healthcare professional's instructions.              Pneumonmia Discharge Instructions                Chronic Kindey Disease Education             Diabetes Discharge Instructions                                    [facility] complies with applicable Federal civil rights laws and does not discriminate on the basis of race, color, national origin, age, disability, or sex.

## 2017-02-12 PROBLEM — R07.9 RIGHT-SIDED CHEST PAIN: Status: ACTIVE | Noted: 2017-02-12

## 2017-02-12 PROBLEM — J90 PLEURAL EFFUSION, RIGHT: Status: ACTIVE | Noted: 2017-02-12

## 2017-02-12 PROBLEM — E78.5 HYPERLIPIDEMIA: Chronic | Status: ACTIVE | Noted: 2017-02-12

## 2017-02-12 PROBLEM — Z86.73 HISTORY OF STROKE: Status: ACTIVE | Noted: 2017-02-12

## 2017-02-12 LAB
APPEARANCE FLD: CLEAR
BODY FLD TYPE: NORMAL
COLOR FLD: COLORLESS
LACTATE SERPL-SCNC: 0.6 MMOL/L
LYMPHOCYTES NFR FLD MANUAL: 40 %
MONOS+MACROS NFR FLD MANUAL: 43 %
NEUTROPHILS NFR FLD MANUAL: 17 %
POCT GLUCOSE: 110 MG/DL (ref 70–110)
POCT GLUCOSE: 125 MG/DL (ref 70–110)
POCT GLUCOSE: 189 MG/DL (ref 70–110)
POCT GLUCOSE: 94 MG/DL (ref 70–110)
PROCALCITONIN SERPL IA-MCNC: 0.11 NG/ML
TROPONIN I SERPL DL<=0.01 NG/ML-MCNC: 0.01 NG/ML
WBC # FLD: 48 /CU MM

## 2017-02-12 PROCEDURE — 11000001 HC ACUTE MED/SURG PRIVATE ROOM

## 2017-02-12 PROCEDURE — 83605 ASSAY OF LACTIC ACID: CPT

## 2017-02-12 PROCEDURE — 89051 BODY FLUID CELL COUNT: CPT

## 2017-02-12 PROCEDURE — 90945 DIALYSIS ONE EVALUATION: CPT

## 2017-02-12 PROCEDURE — 87070 CULTURE OTHR SPECIMN AEROBIC: CPT

## 2017-02-12 PROCEDURE — 63600175 PHARM REV CODE 636 W HCPCS: Performed by: INTERNAL MEDICINE

## 2017-02-12 PROCEDURE — 25000003 PHARM REV CODE 250: Performed by: INTERNAL MEDICINE

## 2017-02-12 PROCEDURE — 36415 COLL VENOUS BLD VENIPUNCTURE: CPT

## 2017-02-12 PROCEDURE — 84484 ASSAY OF TROPONIN QUANT: CPT

## 2017-02-12 PROCEDURE — 87205 SMEAR GRAM STAIN: CPT

## 2017-02-12 PROCEDURE — 84145 PROCALCITONIN (PCT): CPT

## 2017-02-12 PROCEDURE — 94761 N-INVAS EAR/PLS OXIMETRY MLT: CPT

## 2017-02-12 RX ORDER — AMOXICILLIN 250 MG
1 CAPSULE ORAL 2 TIMES DAILY
Status: DISCONTINUED | OUTPATIENT
Start: 2017-02-12 | End: 2017-02-13 | Stop reason: HOSPADM

## 2017-02-12 RX ORDER — BENZONATATE 100 MG/1
100 CAPSULE ORAL 3 TIMES DAILY PRN
Status: DISCONTINUED | OUTPATIENT
Start: 2017-02-12 | End: 2017-02-13 | Stop reason: HOSPADM

## 2017-02-12 RX ORDER — PRAVASTATIN SODIUM 40 MG/1
40 TABLET ORAL DAILY
Status: DISCONTINUED | OUTPATIENT
Start: 2017-02-12 | End: 2017-02-13 | Stop reason: HOSPADM

## 2017-02-12 RX ORDER — IBUPROFEN 200 MG
16 TABLET ORAL
Status: DISCONTINUED | OUTPATIENT
Start: 2017-02-12 | End: 2017-02-13 | Stop reason: HOSPADM

## 2017-02-12 RX ORDER — CLOPIDOGREL BISULFATE 75 MG/1
75 TABLET ORAL DAILY
Status: DISCONTINUED | OUTPATIENT
Start: 2017-02-12 | End: 2017-02-13 | Stop reason: HOSPADM

## 2017-02-12 RX ORDER — INSULIN ASPART 100 [IU]/ML
0-5 INJECTION, SOLUTION INTRAVENOUS; SUBCUTANEOUS
Status: DISCONTINUED | OUTPATIENT
Start: 2017-02-12 | End: 2017-02-13 | Stop reason: HOSPADM

## 2017-02-12 RX ORDER — METOPROLOL SUCCINATE 25 MG/1
25 TABLET, EXTENDED RELEASE ORAL DAILY
Status: DISCONTINUED | OUTPATIENT
Start: 2017-02-12 | End: 2017-02-13 | Stop reason: HOSPADM

## 2017-02-12 RX ORDER — IBUPROFEN 200 MG
24 TABLET ORAL
Status: DISCONTINUED | OUTPATIENT
Start: 2017-02-12 | End: 2017-02-13 | Stop reason: HOSPADM

## 2017-02-12 RX ORDER — HEPARIN SODIUM 5000 [USP'U]/ML
5000 INJECTION, SOLUTION INTRAVENOUS; SUBCUTANEOUS EVERY 8 HOURS
Status: DISCONTINUED | OUTPATIENT
Start: 2017-02-12 | End: 2017-02-13 | Stop reason: HOSPADM

## 2017-02-12 RX ORDER — ONDANSETRON 4 MG/1
4 TABLET, ORALLY DISINTEGRATING ORAL EVERY 6 HOURS PRN
Status: DISCONTINUED | OUTPATIENT
Start: 2017-02-12 | End: 2017-02-13 | Stop reason: HOSPADM

## 2017-02-12 RX ORDER — GLUCAGON 1 MG
1 KIT INJECTION
Status: DISCONTINUED | OUTPATIENT
Start: 2017-02-12 | End: 2017-02-13 | Stop reason: HOSPADM

## 2017-02-12 RX ORDER — ASPIRIN 81 MG/1
81 TABLET ORAL DAILY
Status: DISCONTINUED | OUTPATIENT
Start: 2017-02-12 | End: 2017-02-13 | Stop reason: HOSPADM

## 2017-02-12 RX ORDER — HYDROMORPHONE HYDROCHLORIDE 1 MG/ML
0.5 INJECTION, SOLUTION INTRAMUSCULAR; INTRAVENOUS; SUBCUTANEOUS EVERY 4 HOURS PRN
Status: DISCONTINUED | OUTPATIENT
Start: 2017-02-12 | End: 2017-02-13 | Stop reason: HOSPADM

## 2017-02-12 RX ORDER — GABAPENTIN 300 MG/1
300 CAPSULE ORAL NIGHTLY
Status: DISCONTINUED | OUTPATIENT
Start: 2017-02-12 | End: 2017-02-13 | Stop reason: HOSPADM

## 2017-02-12 RX ORDER — ALLOPURINOL 100 MG/1
100 TABLET ORAL DAILY
Status: DISCONTINUED | OUTPATIENT
Start: 2017-02-12 | End: 2017-02-13 | Stop reason: HOSPADM

## 2017-02-12 RX ORDER — AMLODIPINE BESYLATE 5 MG/1
5 TABLET ORAL DAILY
Status: DISCONTINUED | OUTPATIENT
Start: 2017-02-12 | End: 2017-02-13

## 2017-02-12 RX ADMIN — CEFEPIME: 1 INJECTION, POWDER, FOR SOLUTION INTRAVENOUS at 05:02

## 2017-02-12 RX ADMIN — HEPARIN SODIUM 5000 UNITS: 5000 INJECTION, SOLUTION INTRAVENOUS; SUBCUTANEOUS at 05:02

## 2017-02-12 RX ADMIN — HYDROMORPHONE HYDROCHLORIDE 0.5 MG: 1 INJECTION, SOLUTION INTRAMUSCULAR; INTRAVENOUS; SUBCUTANEOUS at 03:02

## 2017-02-12 RX ADMIN — ASPIRIN 81 MG: 81 TABLET, COATED ORAL at 09:02

## 2017-02-12 RX ADMIN — PRAVASTATIN SODIUM 40 MG: 40 TABLET ORAL at 09:02

## 2017-02-12 RX ADMIN — HEPARIN SODIUM 5000 UNITS: 5000 INJECTION, SOLUTION INTRAVENOUS; SUBCUTANEOUS at 09:02

## 2017-02-12 RX ADMIN — STANDARDIZED SENNA CONCENTRATE AND DOCUSATE SODIUM 1 TABLET: 8.6; 5 TABLET, FILM COATED ORAL at 09:02

## 2017-02-12 RX ADMIN — GABAPENTIN 300 MG: 300 CAPSULE ORAL at 03:02

## 2017-02-12 RX ADMIN — Medication 1 CAPSULE: at 09:02

## 2017-02-12 RX ADMIN — ALLOPURINOL 100 MG: 100 TABLET ORAL at 09:02

## 2017-02-12 RX ADMIN — GABAPENTIN 300 MG: 300 CAPSULE ORAL at 08:02

## 2017-02-12 RX ADMIN — HEPARIN SODIUM 5000 UNITS: 5000 INJECTION, SOLUTION INTRAVENOUS; SUBCUTANEOUS at 02:02

## 2017-02-12 RX ADMIN — AMLODIPINE BESYLATE 5 MG: 5 TABLET ORAL at 09:02

## 2017-02-12 RX ADMIN — CEFEPIME: 1 INJECTION, POWDER, FOR SOLUTION INTRAMUSCULAR; INTRAVENOUS at 05:02

## 2017-02-12 RX ADMIN — HYDROMORPHONE HYDROCHLORIDE 0.5 MG: 1 INJECTION, SOLUTION INTRAMUSCULAR; INTRAVENOUS; SUBCUTANEOUS at 09:02

## 2017-02-12 RX ADMIN — STANDARDIZED SENNA CONCENTRATE AND DOCUSATE SODIUM 1 TABLET: 8.6; 5 TABLET, FILM COATED ORAL at 08:02

## 2017-02-12 RX ADMIN — METOPROLOL SUCCINATE 25 MG: 25 TABLET, FILM COATED, EXTENDED RELEASE ORAL at 09:02

## 2017-02-12 RX ADMIN — CLOPIDOGREL BISULFATE 75 MG: 75 TABLET ORAL at 09:02

## 2017-02-12 NOTE — ED PROVIDER NOTES
Encounter Date: 2/11/2017       History     Chief Complaint   Patient presents with    Back Pain     Patient presents to the ED with complaints of having back pain x 1 week. Patient reports having hx of back pain and that pain is now worse. Hx of peritoneal dialysis      Review of patient's allergies indicates:  No Known Allergies  HPI Comments: Terry Cote, a 67 y.o. male, complains of pain in the right lower chest for the past one week.  He states he has a history of end-stage renal disease and is on peritoneal dialysis nightly at home.  He said he saw his doctor 2 days ago and was told he had fluid in the right lower lung.  He denies any shortness of breath but complains of pain with deep breathing and motion.  Pain location: Right lower chest pain  Pain Severity: Moderate    Pain timing: One week  Pain character: Sharp    Associated with or Modified by: (see above)        Past Medical History   Diagnosis Date    Anemia in chronic kidney disease     Arthritis      unable to walk due to arthritis in knees.     Coronary artery disease     Diabetes mellitus     Diabetes mellitus, type 2     Diabetic nephropathy 4/22/2015    Diastolic dysfunction     Hypertension     Myocardial infarction 2001    Peritoneal dialysis catheter in place 2014    Secondary hyperparathyroidism     Stroke 2001, 2013, 10/2014     weak, slurred speech, equal hand grasp/equal leg movements    Type 2 diabetes mellitus for many yrs about 24 yrs 4/22/2015    Unspecified disorder of kidney and ureter      No past medical history pertinent negatives.  Past Surgical History   Procedure Laterality Date    Cataract extraction      Neck surgery      Spine surgery      Peritoneal catheter insertion      Av fistula placement Left 2011     wrist;    Portacath placement  march 2016     left chest     Family History   Problem Relation Age of Onset    Cancer Mother     Cancer Father     No Known Problems Sister     No Known  Problems Brother     No Known Problems Sister     No Known Problems Sister     No Known Problems Brother      Social History   Substance Use Topics    Smoking status: Never Smoker    Smokeless tobacco: Never Used    Alcohol use No     Review of Systems   Constitutional: Negative for chills and fever.   Respiratory: Negative for shortness of breath.    Cardiovascular: Positive for chest pain.   All other systems reviewed and are negative.      Physical Exam   Initial Vitals   BP Pulse Resp Temp SpO2   02/11/17 2054 02/11/17 2054 02/11/17 2054 02/11/17 2054 02/11/17 2054   148/93 89 17 97.1 °F (36.2 °C) 97 %     Physical Exam    Nursing note and vitals reviewed.  Constitutional: He appears well-developed and well-nourished. No distress.   HENT:   Head: Atraumatic.   Eyes: Conjunctivae and EOM are normal.   Neck: Normal range of motion. Neck supple.   Cardiovascular: Normal rate, regular rhythm and normal heart sounds.   Pulmonary/Chest: No respiratory distress.   Decreased breath sounds at right base compared to left lung.  Lung sounds are clear bilaterally.   Abdominal: Soft. There is no tenderness.   Musculoskeletal: Normal range of motion.   Neurological: He is oriented to person, place, and time. He has normal strength.   Skin: Skin is warm and dry.   Psychiatric: He has a normal mood and affect. His behavior is normal. Thought content normal.         ED Course   Procedures  Labs Reviewed   CULTURE, BLOOD   CULTURE, BLOOD   CBC W/ AUTO DIFFERENTIAL   COMPREHENSIVE METABOLIC PANEL   PROTIME-INR   TROPONIN I   LACTIC ACID, PLASMA   PHOSPHORUS   MAGNESIUM     EKG Readings: (Independently Interpreted)   Initial Reading: No STEMI. Rhythm: Sinus Tachycardia. Heart Rate: 104. Ectopy: No Ectopy. Conduction: Normal. ST Segments: Normal ST Segments. Axis: Normal.          Medical Decision Making:   Initial Assessment:   67 y.o. male, complains of pain in the right lower chest for the past one week.  He states he has a  history of end-stage renal disease and is on peritoneal dialysis nightly at home.  He said he saw his doctor 2 days ago and was told he had fluid in the right lower lung.  He denies any shortness of breath but complains of pain with deep breathing and motion.  Pain location: Right lower chest pain  Pain Severity: Moderate    PE: Decreased breath sounds in right base but no chest wall tenderness.  Differential Diagnosis:   Pneumonia, pleural effusion, musculoskeletal pain  ED Management:  The patient will be admitted by [facility] medicine for pneumonia and pleural effusion on the right side.  He has end-stage renal disease on daily  peritoneal dialysis.                     ED Course   Comment By Time   Consult [facility] Medicine Fady Nevarez Jr., MD 02/11 5789     Clinical Impression:   The primary encounter diagnosis was Pleural effusion, right. Diagnoses of Pneumonia of right lower lobe due to infectious organism, ESRD on peritoneal dialysis, Right-sided chest pain, ESRD on dialysis, and ESRD (end stage renal disease) on dialysis were also pertinent to this visit.          Fady Nevarez Jr., MD  02/12/17 2352

## 2017-02-12 NOTE — PLAN OF CARE
Pt lying in bed, no acute distress noted. Pt remains on tele running SR in 90s. Bed alarm on, call light in reach, fall precautions in place. Report given to oncoming nurse.

## 2017-02-12 NOTE — H&P
[facility] Internal Medicine History and Physical - Resident Note    Admitting Team: Internal Medicine team A  Attending Physician: Roxane  Resident: Jackie  Interns: Delano    Date of Admit: 2/11/2017    Chief Complaint     Worsening Right Flank Pain for 1 Week    Subjective:      History of Present Illness:  Terry Cote is a 67 y.o.  male who  has a past medical history of ESRD on daily PD, DM2 with neuropathy; CAD, Hypertension; Myocardial infarction (2001); Stroke (2001, 2014), and constipation. The patient presented to [facility] on 2/11/2017 with a primary complaint of right sided flank pain for the past week that acutely worsened on the day of presentation. He describes the pain as constant and stabbing, non-radiating, worse with movement. He states he has experienced pain similar to this a couple of months ago when he was told he had fluid on his lung that needed to be drained. After getting this fluid drained in the past his pain subsided. He was at his PCP earlier this week and was told there was fluid on his right lower lung. He has record of two thoracenteses last year.   He denies fever, chills, SOB, CP, pleuritic pain, nausea, vomiting, diarrhea, productive cough. He is not anuric, but makes very little urine he says. His nephrologist is Dr. Hand. His last BM was day before admission and normal, + flatus. He takes laxatives at home PRN constipation. He uses a wheelchair.     Past Medical History:  Past Medical History   Diagnosis Date    Anemia in chronic kidney disease     Arthritis      unable to walk due to arthritis in knees.     Coronary artery disease     Diabetes mellitus     Diabetes mellitus, type 2     Diabetic nephropathy 4/22/2015    Diastolic dysfunction     Hypertension     Myocardial infarction 2001    Peritoneal dialysis catheter in place 2014    Secondary hyperparathyroidism     Stroke 2001, 2013, 10/2014     weak, slurred speech,  equal hand grasp/equal leg movements    Type 2 diabetes mellitus for many yrs about 25 yrs 4/22/2015    Unspecified disorder of kidney and ureter        Past Surgical History:  Past Surgical History   Procedure Laterality Date    Cataract extraction      Neck surgery      Spine surgery      Peritoneal catheter insertion      Av fistula placement Left 2011     wrist;    Portacath placement  march 2016     left chest       Allergies:  Review of patient's allergies indicates:  No Known Allergies    Home Medications:  Prior to Admission medications    Medication Sig Start Date End Date Taking? Authorizing Provider   allopurinol (ZYLOPRIM) 100 MG tablet Take 1 tablet (100 mg total) by mouth once daily. 1/16/15  Yes Kathy Casey MD   amlodipine (NORVASC) 5 MG tablet Take 1 tablet (5 mg total) by mouth once daily. 11/7/16 11/7/17 Yes Samuel Jay MD   aspirin (ECOTRIN) 81 MG EC tablet Take 1 tablet (81 mg total) by mouth once daily. 12/18/14  Yes Maria Esther Mayo MD   benzonatate (TESSALON) 100 MG capsule Take 1 capsule (100 mg total) by mouth 3 (three) times daily as needed. 2/8/17 2/18/17 Yes Duncan Rincon MD   clopidogrel (PLAVIX) 75 mg tablet Take 75 mg by mouth once daily.   Yes Historical Provider, MD   docusate sodium (COLACE) 100 MG capsule Take 1 capsule (100 mg total) by mouth 2 (two) times daily as needed for Constipation. 11/14/16  Yes Kimber Dias NP   EX-LAX, SENNOSIDES, ORAL Take by mouth as needed. As directed   Yes Historical Provider, MD   gabapentin (NEURONTIN) 300 MG capsule Take 1 capsule (300 mg total) by mouth every evening.  Patient taking differently: Take 300 mg by mouth once daily.  1/16/15  Yes Kathy Casey MD   insulin aspart protamine-insulin aspart (NOVOLOG 70/30) 100 unit/mL (70-30) InPn pen Inject 5 Units into the skin 3 (three) times daily with meals.  Patient taking differently: Inject 8 Units into the skin 3 (three) times daily with meals.   16  Yes Samuel Jay MD   insulin glargine (LANTUS SOLOSTAR) 100 unit/mL (3 mL) InPn pen Inject 20 Units into the skin every evening. 16 Yes Samuel Jay MD   metoprolol succinate (TOPROL-XL) 25 MG 24 hr tablet Take 1 tablet (25 mg total) by mouth once daily. 3/29/16 3/29/17 Yes Brianna Church MD   ondansetron (ZOFRAN-ODT) 4 MG TbDL  16  Yes Historical Provider, MD   ondansetron (ZOFRAN-ODT) 8 MG TbDL Take 1 tablet (8 mg total) by mouth every 8 (eight) hours as needed (for nausea or vomiting). 16  Yes Sahara Douglas MD   potassium chloride SA (K-DUR,KLOR-CON) 20 MEQ tablet TK 1 T PO ONCE  D 16  Yes Historical Provider, MD   pravastatin (PRAVACHOL) 80 MG tablet Take 40 mg by mouth once daily.   Yes Historical Provider, MD   senna-docusate 8.6-50 mg (PERICOLACE) 8.6-50 mg per tablet Take 1 tablet by mouth 2 (two) times daily. 16  Yes Sahara Douglas MD   vitamin renal formula, B-complex-vitamin c-folic acid, (NEPHROCAP) 1 mg Cap Take 1 capsule by mouth once daily. 7/23/15  Yes Mariaa Alvarez,    NYSTOP powder APPLY TO AFFECTED AREA Q 6 H TO SITE 16   Historical Provider, MD       Family History:  Family History   Problem Relation Age of Onset    Cancer Mother     Cancer Father     No Known Problems Sister     No Known Problems Brother     No Known Problems Sister     No Known Problems Sister     No Known Problems Brother        Social History:  Social History   Substance Use Topics    Smoking status: Never Smoker    Smokeless tobacco: Never Used    Alcohol use No       Review of Systems:  Pertinent items are noted in HPI.     Health Maintaince :   Primary Care Physician: Mckenzie  Immunizations:   TDap is up to date.  Influenza unknown up to date.  Pneumovax is up to date.    Objective:   Last 24 Hour Vital Signs:  BP  Min: 142/73  Max: 164/69  Temp  Av.8 °F (36.6 °C)  Min: 97.1 °F (36.2 °C)  Max: 98.3 °F (36.8 °C)  Pulse  Av  Min: 89  Max:  "110  Resp  Av.1  Min: 17  Max: 22  SpO2  Av.4 %  Min: 97 %  Max: 100 %  Height  Av' 11" (180.3 cm)  Min: 5' 11" (180.3 cm)  Max: 5' 11" (180.3 cm)  Weight  Av.9 kg (173 lb 14.4 oz)  Min: 78.5 kg (173 lb)  Max: 79.3 kg (174 lb 12.8 oz)  Body mass index is 24.38 kg/(m^2).       Physical Examination:  General appearance: appears stated age, cooperative, moderate distress secondary to pain, AAOx4,   Eyes: conjunctivae/corneas clear. PERRL, EOM's intact.   Nose: Nares normal. Septum midline. Mucosa normal. No drainage or sinus tenderness.  Throat: lips, mucosa, and tongue normal; teeth and gums normal  Neck: no adenopathy, no JVD, supple, symmetrical, trachea midline   Lungs: decreased breath sounds at right base, clear to auscultation bilaterally otherwise  Heart: regular rate and rhythm, S1, S2 normal, no murmur  Abdomen: soft, non-tender, no tenderness to palpation, no CVA tenderness PD catheter in place,. Bowel sounds normal  Extremities: numerous hyperpigmentations. Left AV fistula present. No peripheral edema or erythema.  Pulses: 2+ and symmetric  Skin: hyperpigmentation, no rashes appreciated    Laboratory:  Most Recent Data:  CBC: Lab Results   Component Value Date    WBC 8.86 2017    HGB 13.5 (L) 2017    HCT 41.7 2017     2017    MCV 82 2017    RDW 14.1 2017     BMP: Lab Results   Component Value Date     2017    K 3.8 2017     2017    CO2 25 2017    BUN 17 2017    CREATININE 5.7 (H) 2017     (H) 2017    CALCIUM 9.4 2017    MG 1.6 2017    PHOS 2.5 (L) 2017     LFTs: Lab Results   Component Value Date    PROT 8.0 2017    ALBUMIN 2.8 (L) 2017    BILITOT 0.3 2017    AST 21 2017    ALKPHOS 97 2017    ALT 16 2017     Coags:   Lab Results   Component Value Date    INR 1.1 2017     Thyroid: Lab Results   Component Value Date    TSH 0.722 " 11/04/2016     Anemia: Lab Results   Component Value Date    IRON 31 (L) 11/04/2016    TIBC 71 (L) 11/04/2016    FERRITIN 1200 (H) 11/04/2016    BEVORMNH83 1718 (H) 11/04/2016    FOLATE 18.2 11/04/2016     Cardiac: Lab Results   Component Value Date    TROPONINI 0.012 02/11/2017       Trended Lab Data:    Recent Labs  Lab 02/08/17  1141 02/11/17 2122   WBC 9.70 8.86   HGB 11.9* 13.5*   HCT 35.8* 41.7    200   MCV 82 82   RDW 14.0 14.1    141   K 3.3* 3.8    101   CO2 26 25   BUN 16 17   CREATININE 5.6* 5.7*    112*   PROT 7.6 8.0   ALBUMIN 2.5* 2.8*   BILITOT 0.4 0.3   AST 13 21   ALKPHOS 90 97   ALT 14 16       Trended Cardiac Data:    Recent Labs  Lab 02/11/17 2122   TROPONINI 0.012       Other Results:  EKG (my interpretation): unchanged from previous tracings, sinus tachycardia.    Radiology:  Imaging Results         CT Abdomen Pelvis  Without Contrast (Final result) Result time:  02/12/17 01:37:57    Final result by Raymundo Todd MD (02/12/17 01:37:57)    Impression:        1. Bilateral nonobstructing nephrolithiasis, grossly stable in the burden when compared to most recent CT dated 11/16/16.    2. Grossly stable small to moderate size right pleural effusion with interval decrease left-sided pleural effusion, now trace.    3. Peritoneal dialysis catheter with interval increased moderate amount of free fluid throughout the abdomen and pelvis that may be related to dialysis versus worsening nonspecific ascites.    4. Apparent circumferential wall thickening of the urinary bladder which may be related to under distention versus cystitis.    5. Prostatomegaly.    6. Previous supraumbilical ventral abdominal wall gas and fluid collections have decreased in size with resolution of gas, now with localized stranding without definite loculation, suggesting scarring. Correlate clinically.    7. Grossly stable additional findings as above.      Electronically signed by: RAYMUNDO TODD MD  MD  Date:     02/12/17  Time:    01:37     Narrative:    CT of the abdomen and pelvis without contrast per renal stone protocol:    Results:  Comparison made to CT abdomen and pelvis 11/16/16.  Please note that the lack of intravenous contrast limits evaluation of soft tissue and vascular structures.    Included lung bases show grossly stable small to moderate sized layering right pleural effusion, and interval decrease layering left pleural effusion now trace. There is continued overlying compressive atelectasis of the right greater than left lower lobes, noting grossly stable scattered nonspecific hyperdense foci throughout the consolidated bilateral lower lobes. The visualized heart is stable in size and configuration without significant pericardial fluid. Coronary artery calcifications noted.    Left percutaneous approach peritoneal dialysis catheter again identified coiled within the right lower quadrant.    Interval increased amount of simple appearing free fluid throughout the abdomen and pelvis which could be related to dialysis versus worsening nonspecific ascites. Previous pneumoperitoneum has resolved.    Posterior right hepatic lobe small parenchymal calcification again noted. Liver and spleen are normal in size without new focal parenchymal process seen. The stomach is moderately distended with gastric contents without wall thickening. Duodenum, pancreas and bilateral adrenal glands are within normal limits.    The kidneys are atrophic and grossly stable in shape, size, and location.  Bilateral renal diffuse nonobstructing nephroliths and probable renal calcifications similar to previous study. No radiodense calculus seen within the ureters on either side or urinary bladder. No hydronephrosis. Grossly stable low-attenuation cortical foci throughout both kidneys, incompletely evaluated without intravenous contrast. The ureters are normal in course and caliber.  The urinary bladder is suboptimally  distended with mild circumferential wall thickening.  The prostate remains enlarged with coarse calcifications in the central gland. Pelvic phleboliths noted.    Previous supraumbilical ventral abdominal wall gas and fluid collections have decreased in size now with localized stranding without definite loculation. No lymphadenopathy.    The appendix and terminal ileum are grossly within normal limits allowing for adjacent free fluid.  The small and large loops of bowel are normal in caliber without focal wall thickening definitively seen.  No pneumatosis or portal venous gas.    Mild diffuse atherosclerosis. The aorta tapers appropriately in its descent through the abdomen.    The osseous structures appear grossly stable without acute or destructive process seen.            X-Ray Chest PA And Lateral (Final result) Result time:  02/11/17 22:09:11    Final result by Raymundo Todd MD (02/11/17 22:09:11)    Impression:        Continued right pleural effusion with probable underlying right lower lobe atelectasis/infiltrate, not significantly changed from 2/8/17.    Overall, grossly stable chest.      Electronically signed by: RAYMUNDO TODD MD, MD  Date:     02/11/17  Time:    22:09     Narrative:    COMPARISON: Chest radiograph 2/8/17 and CT abdomen and pelvis 11/16/16    FINDINGS: Two views of the chest. Monitoring leads overlie the chest. Continued blunting of the right costophrenic angle with hazy opacification of the right lung base and thickening of the right minor fissure consistent with pleural effusion with probable underlying atelectasis/infiltrate, not significantly changed. Left hemithorax remains clear. Cardiomediastinal silhouette appears grossly stable without convincing evidence of failure. No acute osseous process seen.               Assessment:     Terry Cote is a 67 y.o. 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 08/25/2015    Hand muscle atrophy 08/25/2015    Vitamin D deficiency 08/22/2015    Type 2 diabetes mellitus with diabetic chronic kidney disease 08/21/2015    (HFpEF) heart failure with preserved ejection fraction 08/19/2015    Elevated PSA 08/19/2015    Transient alteration of awareness 08/19/2015    Neuropathy 07/23/2015    DM type 2 causing renal disease 07/23/2015    ESRD on peritoneal dialysis 07/23/2015    Stroke     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 05/14/2015    Organ transplant candidate 04/22/2015    Pre-transplant evaluation for chronic kidney disease 04/22/2015    Type 2 diabetes mellitus for many yrs about 25 yrs 04/22/2015    Diabetic nephropathy 04/22/2015    Secondary hyperparathyroidism     Anemia in chronic kidney disease     ESRD on dialysis 03/20/2015    Diabetes mellitus, type 2 01/23/2015    Dyslipidemia associated with type 2 diabetes mellitus 01/23/2015    Hypertension associated with diabetes 01/23/2015    Hyperuricemia 01/23/2015    ESRD (end stage renal disease) on dialysis 01/16/2015     Class: Acute    CHF with left ventricular diastolic dysfunction, NYHA class 1 12/19/2014    Unspecified transient cerebral ischemia 12/18/2014        Plan:     Right Pleural Effusion  - CXR with evidence of R pleural effusion  - CT abdomen with small to moderate size right pleural effusion as well, bilateral nonobstructing nephrolithiasis that is stable, PD catheter with  interval increased moderate amount of free fluid throughout the abdomen and pelvis that may be related to dialysis versus worsening nonspecific ascites  - unsure of etiology. No leukocytosis or bandemia, afebrile, hemodynamically stable. Increased lactate to 3.3, but could also be elevated due to poor renal function.  - IR consulted for thoracentesis. Fluid studies include: cell count with diff, Gram stain, cytology, glucose, LDH, protein, albumin  - previous episodes of pleural effusion with similar symptoms  - pain control with hydromorphone PRN  - will continue to monitor    ESRD  - Cr at baseline per records  - PD nightly for 9 hrs per patient  - Nephrology consulted    DM2  - Levemir 10u nightly  - will continue to monitor blood glucose for changing insulin requirements    HTN  - continue home antihypertensives    HFpEF  - continue Toporol XL  - most recent echo with normal EF and LV diastolic dysfunction    H/o Stroke  - continue ASA, plavix, statin  - no acute neurologic changes    Constipation  - last BM day before admission  - do not believe pain is 2/2 to constipation or acute abdominal process  - continue home bowel regimen    HLD  - continue home statin    Anemia of Chronic Disease  - worked up 11/2016  - likely 2/2 chronic renal disease    Ppx: heparin  FEN: no IVF at this time; replace e- PRN; NPO    Dispo: Admit for severe pain likely associated with pleural effusion; IR consulted for thoracentesis; Nephrology consulted for PD.    Code Status:     Full    José A Hoisington  [facility] Internal Medicine HO-I  [facility] Internal Medicine Service    [facility] Medicine Hospitalist Pager numbers:   [facility] Hospitalist Medicine Team A (Roxane/Aide): [phone]  [facility] Hospitalist Medicine Team B (Mirna/Felipe):  [phone]

## 2017-02-12 NOTE — PLAN OF CARE
Future Appointments  Date Time Provider Department Center   3/22/2017 9:00 AM Louisa Zuniga MD [facility] DARIAN [facility]           02/12/17 1552   Discharge Assessment   Assessment Type Discharge Planning Assessment   Confirmed/corrected address and phone number on facesheet? Yes   Assessment information obtained from? Patient   Prior to hospitilization cognitive status: Alert/Oriented   Prior to hospitalization functional status: Assistive Equipment;Needs Assistance   Current cognitive status: Alert/Oriented   Current Functional Status: Assistive Equipment;Needs Assistance   Arrived From home or self-care   Lives With spouse   Able to Return to Prior Arrangements yes   Is patient able to care for self after discharge? Yes   How many people do you have in your home that can help with your care after discharge? 1   Who are your caregiver(s) and their phone number(s)? [email or web address]- [phone]   Patient's perception of discharge disposition home or selfcare   Readmission Within The Last 30 Days no previous admission in last 30 days   Patient currently being followed by outpatient case management? No   Patient currently receives home health services? No   Does the patient currently use HME? Yes   Patient currently receives private duty nursing? No   Patient currently receives any other outside agency services? No   Equipment Currently Used at Home 3-in-1 commode;bath bench;walker, rolling;rollator   Do you have any problems affording any of your prescribed medications? No   Is the patient taking medications as prescribed? yes   Do you have any financial concerns preventing you from receiving the healthcare you need? No   Does the patient have transportation to healthcare appointments? Yes   Transportation Available family or friend will provide   On Dialysis? Yes   If yes, what is the name of the dialysis unit? [facility] Phan   Does the patient receive outpatient dialysis? No  (pt does peritoneal dialysis)    Does the patient receive services at the [facility]? No   Are there any open cases? No   Discharge Plan A Home with family   Discharge Plan B Home with family;Home Health   Patient/Family In Agreement With Plan yes

## 2017-02-12 NOTE — ED NOTES
Awaiting further orders; labs pending and pt took pain med; spouse at bedside; will continue to monitor

## 2017-02-12 NOTE — CONSULTS
Nephrology Consult    DATE OF ADMISSION:  2/11/2017  DATE OF CONSULT: 2/12/2017  REFERRING PHYSICIAN:  Cecil Betts MD  REASON FOR CONSULTATION:  PD    CC:   Chief Complaint   Patient presents with    Back Pain     Patient presents to the ED with complaints of having back pain x 1 week. Patient reports having hx of back pain and that pain is now worse. Hx of peritoneal dialysis        HPI:  67 y.o. with PMH DM, HTN, CVA, ESRD on PD presents with c/o R sided back/flank pain that began 1 week ago, associated cough productive of clear sputum, stabbing pain, constant without radiation.  He says he has had 2 thoracocentesis in the past 1 year but have not determined the cause for recurrent pleural effusions.  He has h/o peritonitis approximately 1 year ago, PD Cx with STAPHYLOCOCCUS HAEMOLYTICUS on 3/2016.  He denies any break in technique, no abdominal pain, cloudy effluent, no N/V, fevers, problems with PD.  He has however noticed a decline in his UOP and decline in his weight.  His EDW is 80kg and has been 77.6kg despite adjusting his PD treatment to all yellow bags.  Also noticed diarrhea x1 today but otherwise has not had constipation or diarrhea prior to today.      PMH:   Past Medical History   Diagnosis Date    Anemia in chronic kidney disease     Arthritis      unable to walk due to arthritis in knees.     Coronary artery disease     Diabetes mellitus     Diabetes mellitus, type 2     Diabetic nephropathy 4/22/2015    Diastolic dysfunction     Hypertension     Myocardial infarction 2001    Peritoneal dialysis catheter in place 2014    Secondary hyperparathyroidism     Stroke 2001, 2013, 10/2014     weak, slurred speech, equal hand grasp/equal leg movements    Type 2 diabetes mellitus for many yrs about 25 yrs 4/22/2015    Unspecified disorder of kidney and ureter        Allergies: Review of patient's allergies indicates:  No Known Allergies    Home Meds:   No current facility-administered  medications on file prior to encounter.      Current Outpatient Prescriptions on File Prior to Encounter   Medication Sig Dispense Refill    allopurinol (ZYLOPRIM) 100 MG tablet Take 1 tablet (100 mg total) by mouth once daily. 30 tablet 1    amlodipine (NORVASC) 5 MG tablet Take 1 tablet (5 mg total) by mouth once daily. 90 tablet 3    aspirin (ECOTRIN) 81 MG EC tablet Take 1 tablet (81 mg total) by mouth once daily. 30 tablet 3    benzonatate (TESSALON) 100 MG capsule Take 1 capsule (100 mg total) by mouth 3 (three) times daily as needed. 30 capsule 3    clopidogrel (PLAVIX) 75 mg tablet Take 75 mg by mouth once daily.      docusate sodium (COLACE) 100 MG capsule Take 1 capsule (100 mg total) by mouth 2 (two) times daily as needed for Constipation. 100 capsule 0    EX-LAX, SENNOSIDES, ORAL Take by mouth as needed. As directed      gabapentin (NEURONTIN) 300 MG capsule Take 1 capsule (300 mg total) by mouth every evening. (Patient taking differently: Take 300 mg by mouth once daily. ) 30 capsule 3    insulin aspart protamine-insulin aspart (NOVOLOG 70/30) 100 unit/mL (70-30) InPn pen Inject 5 Units into the skin 3 (three) times daily with meals. (Patient taking differently: Inject 8 Units into the skin 3 (three) times daily with meals. ) 3 mL 11    insulin glargine (LANTUS SOLOSTAR) 100 unit/mL (3 mL) InPn pen Inject 20 Units into the skin every evening. 31 Syringe 6    metoprolol succinate (TOPROL-XL) 25 MG 24 hr tablet Take 1 tablet (25 mg total) by mouth once daily. 15 tablet 11    ondansetron (ZOFRAN-ODT) 4 MG TbDL   1    ondansetron (ZOFRAN-ODT) 8 MG TbDL Take 1 tablet (8 mg total) by mouth every 8 (eight) hours as needed (for nausea or vomiting). 30 tablet 0    potassium chloride SA (K-DUR,KLOR-CON) 20 MEQ tablet TK 1 T PO ONCE  D  2    pravastatin (PRAVACHOL) 80 MG tablet Take 40 mg by mouth once daily.      senna-docusate 8.6-50 mg (PERICOLACE) 8.6-50 mg per tablet Take 1 tablet by mouth 2  (two) times daily. 30 tablet 0    vitamin renal formula, B-complex-vitamin c-folic acid, (NEPHROCAP) 1 mg Cap Take 1 capsule by mouth once daily. 90 capsule 1    NYSTOP powder APPLY TO AFFECTED AREA Q 6 H TO SITE  0       Inpatient Meds: Scheduled Meds:   allopurinol  100 mg Oral Daily    amlodipine  5 mg Oral Daily    aspirin  81 mg Oral Daily    Dianeal PD with additives   Intraperitoneal Once    clopidogrel  75 mg Oral Daily    gabapentin  300 mg Oral QHS    heparin (porcine)  5,000 Units Subcutaneous Q8H    [START ON 2/13/2017] insulin detemir  10 Units Subcutaneous QHS    metoprolol succinate  25 mg Oral Daily    pravastatin  40 mg Oral Daily    senna-docusate 8.6-50 mg  1 tablet Oral BID    vitamin renal formula (B-complex-vitamin c-folic acid)  1 capsule Oral Daily     Continuous Infusions:   PRN Meds:.benzonatate, dextrose 50%, dextrose 50%, glucagon (human recombinant), glucose, glucose, HYDROmorphone, insulin aspart, ondansetron    FH:   Family History   Problem Relation Age of Onset    Cancer Mother     Cancer Father     No Known Problems Sister     No Known Problems Brother     No Known Problems Sister     No Known Problems Sister     No Known Problems Brother        SH:   Social History     Social History    Marital status:      Spouse name: N/A    Number of children: N/A    Years of education: N/A     Occupational History          disable     Social History Main Topics    Smoking status: Never Smoker    Smokeless tobacco: Never Used    Alcohol use No    Drug use: No    Sexual activity: Yes     Partners: Female     Other Topics Concern    Not on file     Social History Narrative       PSxHx:   Past Surgical History   Procedure Laterality Date    Cataract extraction      Neck surgery      Spine surgery      Peritoneal catheter insertion      Av fistula placement Left 2011     wrist;    Portacath placement  march 2016     left chest       ROS:   10 point review  of systems negative except as above.    Physical Exam:   Temp:  [97.1 °F (36.2 °C)-98.4 °F (36.9 °C)] 98.4 °F (36.9 °C)  Pulse:  [] 92  Resp:  [17-22] 18  SpO2:  [97 %-100 %] 98 %  BP: (128-164)/(65-93) 140/70  Vitals:    02/12/17 0938 02/12/17 1100 02/12/17 1200 02/12/17 1243   BP:   (!) 140/70    BP Location:       Patient Position:       BP Method:       Pulse:  86 92    Resp:   18    Temp:   98.4 °F (36.9 °C)    TempSrc:   Oral    SpO2: 98%   98%   Weight:       Height:       I/O last 3 completed shifts:  In: -   Out: 200 [Urine:200]     Intake/Output Summary (Last 24 hours) at 02/12/17 1315  Last data filed at 02/12/17 0600   Gross per 24 hour   Intake                0 ml   Output              200 ml   Net             -200 ml   Vital signs reviewed.  Gen - no acute distress, alert and oriented  HENT - normocephalic, atraumatic  Eyes - extraocular motions intact  CVS - RRR, no murmurs or rubs  Resp -diminished breath sounds in RLL, non-labored, clear to auscultation, no wheezes, rales, or rhonchi  Abd - soft, non-tender, +ascites, no rebound or guarding  Ext/Vascular - no cyanosis or edema  Skin - no rash or lesions  Neuro - alert and oriented, slurred speech, moves all four extremities  Psych - normal mood and affect, normal thought process  Access - PD catheter without erythema or drainage, non-tender along tunnel    Labs:   Recent Results (from the past 24 hour(s))   CBC auto differential    Collection Time: 02/11/17  9:22 PM   Result Value Ref Range    WBC 8.86 3.90 - 12.70 K/uL    RBC 5.06 4.60 - 6.20 M/uL    Hemoglobin 13.5 (L) 14.0 - 18.0 g/dL    Hematocrit 41.7 40.0 - 54.0 %    MCV 82 82 - 98 fL    MCH 26.7 (L) 27.0 - 31.0 pg    MCHC 32.4 32.0 - 36.0 %    RDW 14.1 11.5 - 14.5 %    Platelets 200 150 - 350 K/uL    MPV 9.9 9.2 - 12.9 fL    Gran # 6.4 1.8 - 7.7 K/uL    Lymph # 1.6 1.0 - 4.8 K/uL    Mono # 0.5 0.3 - 1.0 K/uL    Eos # 0.3 0.0 - 0.5 K/uL    Baso # 0.04 0.00 - 0.20 K/uL    Gran% 73.0 38.0  - 73.0 %    Lymph% 18.4 18.0 - 48.0 %    Mono% 5.2 4.0 - 15.0 %    Eosinophil% 2.9 0.0 - 8.0 %    Basophil% 0.5 0.0 - 1.9 %    Platelet Estimate Appears normal     Aniso Slight     Differential Method Automated    Comprehensive metabolic panel    Collection Time: 02/11/17  9:22 PM   Result Value Ref Range    Sodium 141 136 - 145 mmol/L    Potassium 3.8 3.5 - 5.1 mmol/L    Chloride 101 95 - 110 mmol/L    CO2 25 23 - 29 mmol/L    Glucose 112 (H) 70 - 110 mg/dL    BUN, Bld 17 8 - 23 mg/dL    Creatinine 5.7 (H) 0.5 - 1.4 mg/dL    Calcium 9.4 8.7 - 10.5 mg/dL    Total Protein 8.0 6.0 - 8.4 g/dL    Albumin 2.8 (L) 3.5 - 5.2 g/dL    Total Bilirubin 0.3 0.1 - 1.0 mg/dL    Alkaline Phosphatase 97 55 - 135 U/L    AST 21 10 - 40 U/L    ALT 16 10 - 44 U/L    Anion Gap 15 8 - 16 mmol/L    eGFR if African American 11 (A) >60 mL/min/1.73 m^2    eGFR if non African American 9 (A) >60 mL/min/1.73 m^2   Blood culture #1    Collection Time: 02/11/17  9:22 PM   Result Value Ref Range    Blood Culture, Routine No Growth to date    Protime-INR    Collection Time: 02/11/17  9:22 PM   Result Value Ref Range    Prothrombin Time 11.5 9.0 - 12.5 sec    INR 1.1 0.8 - 1.2   Troponin I    Collection Time: 02/11/17  9:22 PM   Result Value Ref Range    Troponin I 0.012 0.000 - 0.026 ng/mL   Lactic acid, plasma    Collection Time: 02/11/17  9:22 PM   Result Value Ref Range    Lactate (Lactic Acid) 3.3 (H) 0.5 - 2.2 mmol/L   Phosphorus    Collection Time: 02/11/17  9:22 PM   Result Value Ref Range    Phosphorus 2.5 (L) 2.7 - 4.5 mg/dL   Magnesium    Collection Time: 02/11/17  9:22 PM   Result Value Ref Range    Magnesium 1.6 1.6 - 2.6 mg/dL   Blood culture #2    Collection Time: 02/11/17  9:45 PM   Result Value Ref Range    Blood Culture, Routine No Growth to date    Procalcitonin    Collection Time: 02/12/17 12:11 AM   Result Value Ref Range    Procalcitonin 0.11 <0.25 ng/mL   POCT glucose    Collection Time: 02/12/17  6:35 AM   Result Value Ref  Range    POCT Glucose 125 (H) 70 - 110 mg/dL   Troponin I    Collection Time: 02/12/17  9:21 AM   Result Value Ref Range    Troponin I 0.015 0.000 - 0.026 ng/mL   Lactic acid, plasma    Collection Time: 02/12/17  9:21 AM   Result Value Ref Range    Lactate (Lactic Acid) 0.6 0.5 - 2.2 mmol/L   POCT glucose    Collection Time: 02/12/17 11:09 AM   Result Value Ref Range    POCT Glucose 110 70 - 110 mg/dL     Imaging Results         CT Abdomen Pelvis  Without Contrast (Final result) Result time:  02/12/17 01:37:57    Final result by Raymundo Todd MD (02/12/17 01:37:57)    Impression:        1. Bilateral nonobstructing nephrolithiasis, grossly stable in the burden when compared to most recent CT dated 11/16/16.    2. Grossly stable small to moderate size right pleural effusion with interval decrease left-sided pleural effusion, now trace.    3. Peritoneal dialysis catheter with interval increased moderate amount of free fluid throughout the abdomen and pelvis that may be related to dialysis versus worsening nonspecific ascites.    4. Apparent circumferential wall thickening of the urinary bladder which may be related to under distention versus cystitis.    5. Prostatomegaly.    6. Previous supraumbilical ventral abdominal wall gas and fluid collections have decreased in size with resolution of gas, now with localized stranding without definite loculation, suggesting scarring. Correlate clinically.    7. Grossly stable additional findings as above.      Electronically signed by: RAYMUNDO TODD MD, MD  Date:     02/12/17  Time:    01:37     Narrative:    CT of the abdomen and pelvis without contrast per renal stone protocol:    Results:  Comparison made to CT abdomen and pelvis 11/16/16.  Please note that the lack of intravenous contrast limits evaluation of soft tissue and vascular structures.    Included lung bases show grossly stable small to moderate sized layering right pleural effusion, and interval decrease layering  left pleural effusion now trace. There is continued overlying compressive atelectasis of the right greater than left lower lobes, noting grossly stable scattered nonspecific hyperdense foci throughout the consolidated bilateral lower lobes. The visualized heart is stable in size and configuration without significant pericardial fluid. Coronary artery calcifications noted.    Left percutaneous approach peritoneal dialysis catheter again identified coiled within the right lower quadrant.    Interval increased amount of simple appearing free fluid throughout the abdomen and pelvis which could be related to dialysis versus worsening nonspecific ascites. Previous pneumoperitoneum has resolved.    Posterior right hepatic lobe small parenchymal calcification again noted. Liver and spleen are normal in size without new focal parenchymal process seen. The stomach is moderately distended with gastric contents without wall thickening. Duodenum, pancreas and bilateral adrenal glands are within normal limits.    The kidneys are atrophic and grossly stable in shape, size, and location.  Bilateral renal diffuse nonobstructing nephroliths and probable renal calcifications similar to previous study. No radiodense calculus seen within the ureters on either side or urinary bladder. No hydronephrosis. Grossly stable low-attenuation cortical foci throughout both kidneys, incompletely evaluated without intravenous contrast. The ureters are normal in course and caliber.  The urinary bladder is suboptimally distended with mild circumferential wall thickening.  The prostate remains enlarged with coarse calcifications in the central gland. Pelvic phleboliths noted.    Previous supraumbilical ventral abdominal wall gas and fluid collections have decreased in size now with localized stranding without definite loculation. No lymphadenopathy.    The appendix and terminal ileum are grossly within normal limits allowing for adjacent free fluid.   The small and large loops of bowel are normal in caliber without focal wall thickening definitively seen.  No pneumatosis or portal venous gas.    Mild diffuse atherosclerosis. The aorta tapers appropriately in its descent through the abdomen.    The osseous structures appear grossly stable without acute or destructive process seen.            X-Ray Chest PA And Lateral (Final result) Result time:  02/11/17 22:09:11    Final result by Raymundo Todd MD (02/11/17 22:09:11)    Impression:        Continued right pleural effusion with probable underlying right lower lobe atelectasis/infiltrate, not significantly changed from 2/8/17.    Overall, grossly stable chest.      Electronically signed by: RAYMUNDO TODD MD, MD  Date:     02/11/17  Time:    22:09     Narrative:    COMPARISON: Chest radiograph 2/8/17 and CT abdomen and pelvis 11/16/16    FINDINGS: Two views of the chest. Monitoring leads overlie the chest. Continued blunting of the right costophrenic angle with hazy opacification of the right lung base and thickening of the right minor fissure consistent with pleural effusion with probable underlying atelectasis/infiltrate, not significantly changed. Left hemithorax remains clear. Cardiomediastinal silhouette appears grossly stable without convincing evidence of failure. No acute osseous process seen.              A/P:   1. ESRD - usual PD with Dr Hand Rx: CCPD 9hrs, 5 exchanges 12L, day dwell 2L, Dry weight 80kg.   - clinical exam is not alarming for peritonitis based on findings thus far, however will empirically dwell Vanc and Cefepime today pending peritoneal fluid studies: cell count with diff, gram stain and cultures.  - he has already been given IV abx  - he is low average transporter  - he reports h/o peritonitis ~1yr ago  2. HTN - continue home medications  3. Anemia of chronic kidney disease - no indication for Epo at this time  4. MBD/secondary hyperparathyroidism - hypophosphatemia/hypokalemia,  will discontinue low phos and low K diet  5. Access - PD catheter  6. Nutrition - start Nepro, Alb is very low 2.8 but has come up from 2.5, continue to monitor    Pleural effusion - plans noted for IR tap, unclear etiology. Will also attempt to optimize volume status with PD  DM    Thank you for allowing me to participate in the care of your patient.  Please call with any questions.    Yamile Trujillo MD   Nephrology  [facility]  Office [phone]

## 2017-02-13 ENCOUNTER — TELEPHONE (OUTPATIENT)
Dept: FAMILY MEDICINE | Facility: CLINIC | Age: 67
End: 2017-02-13

## 2017-02-13 VITALS
TEMPERATURE: 98 F | BODY MASS INDEX: 25.08 KG/M2 | RESPIRATION RATE: 20 BRPM | WEIGHT: 179.13 LBS | OXYGEN SATURATION: 99 % | DIASTOLIC BLOOD PRESSURE: 69 MMHG | SYSTOLIC BLOOD PRESSURE: 138 MMHG | HEIGHT: 71 IN | HEART RATE: 89 BPM

## 2017-02-13 LAB
ALBUMIN SERPL BCP-MCNC: 2.2 G/DL
ALP SERPL-CCNC: 80 U/L
ALT SERPL W/O P-5'-P-CCNC: 15 U/L
ANION GAP SERPL CALC-SCNC: 9 MMOL/L
APPEARANCE FLD: NORMAL
AST SERPL-CCNC: 15 U/L
BASOPHILS # BLD AUTO: 0.02 K/UL
BASOPHILS NFR BLD: 0.4 %
BILIRUB SERPL-MCNC: 0.2 MG/DL
BODY FLD TYPE: NORMAL
BUN SERPL-MCNC: 21 MG/DL
CALCIUM SERPL-MCNC: 8.3 MG/DL
CHLORIDE SERPL-SCNC: 103 MMOL/L
CO2 SERPL-SCNC: 27 MMOL/L
COLOR FLD: NORMAL
CREAT SERPL-MCNC: 5.7 MG/DL
DIFFERENTIAL METHOD: ABNORMAL
EOSINOPHIL # BLD AUTO: 0.3 K/UL
EOSINOPHIL NFR BLD: 5.3 %
EOSINOPHIL NFR FLD MANUAL: 6 %
ERYTHROCYTE [DISTWIDTH] IN BLOOD BY AUTOMATED COUNT: 14 %
EST. GFR  (AFRICAN AMERICAN): 11 ML/MIN/1.73 M^2
EST. GFR  (NON AFRICAN AMERICAN): 9 ML/MIN/1.73 M^2
GLUCOSE SERPL-MCNC: 196 MG/DL
HCT VFR BLD AUTO: 33.8 %
HGB BLD-MCNC: 10.9 G/DL
LYMPHOCYTES # BLD AUTO: 1.4 K/UL
LYMPHOCYTES NFR BLD: 24.5 %
LYMPHOCYTES NFR FLD MANUAL: 74 %
MAGNESIUM SERPL-MCNC: 1.5 MG/DL
MCH RBC QN AUTO: 26.7 PG
MCHC RBC AUTO-ENTMCNC: 32.2 %
MCV RBC AUTO: 83 FL
MONOCYTES # BLD AUTO: 0.4 K/UL
MONOCYTES NFR BLD: 7.2 %
MONOS+MACROS NFR FLD MANUAL: 19 %
NEUTROPHILS # BLD AUTO: 3.5 K/UL
NEUTROPHILS NFR BLD: 62.4 %
NEUTROPHILS NFR FLD MANUAL: 1 %
PHOSPHATE SERPL-MCNC: 3.5 MG/DL
PLATELET # BLD AUTO: 161 K/UL
PMV BLD AUTO: 9.5 FL
POCT GLUCOSE: 184 MG/DL (ref 70–110)
POCT GLUCOSE: 246 MG/DL (ref 70–110)
POCT GLUCOSE: 261 MG/DL (ref 70–110)
POTASSIUM SERPL-SCNC: 3.5 MMOL/L
PROT SERPL-MCNC: 6.3 G/DL
RBC # BLD AUTO: 4.09 M/UL
SODIUM SERPL-SCNC: 139 MMOL/L
VANCOMYCIN SERPL-MCNC: 18.4 UG/ML
WBC # BLD AUTO: 5.52 K/UL
WBC # FLD: 2259 /CU MM

## 2017-02-13 PROCEDURE — 0W993ZZ DRAINAGE OF RIGHT PLEURAL CAVITY, PERCUTANEOUS APPROACH: ICD-10-PCS | Performed by: INTERNAL MEDICINE

## 2017-02-13 PROCEDURE — 63600175 PHARM REV CODE 636 W HCPCS: Performed by: INTERNAL MEDICINE

## 2017-02-13 PROCEDURE — 36415 COLL VENOUS BLD VENIPUNCTURE: CPT

## 2017-02-13 PROCEDURE — 82042 OTHER SOURCE ALBUMIN QUAN EA: CPT

## 2017-02-13 PROCEDURE — 83615 LACTATE (LD) (LDH) ENZYME: CPT

## 2017-02-13 PROCEDURE — 25000003 PHARM REV CODE 250: Performed by: INTERNAL MEDICINE

## 2017-02-13 PROCEDURE — 88305 TISSUE EXAM BY PATHOLOGIST: CPT | Performed by: PATHOLOGY

## 2017-02-13 PROCEDURE — 87070 CULTURE OTHR SPECIMN AEROBIC: CPT

## 2017-02-13 PROCEDURE — 84157 ASSAY OF PROTEIN OTHER: CPT

## 2017-02-13 PROCEDURE — 88305 TISSUE EXAM BY PATHOLOGIST: CPT | Mod: 26,,, | Performed by: PATHOLOGY

## 2017-02-13 PROCEDURE — 84100 ASSAY OF PHOSPHORUS: CPT

## 2017-02-13 PROCEDURE — 80202 ASSAY OF VANCOMYCIN: CPT

## 2017-02-13 PROCEDURE — 88112 CYTOPATH CELL ENHANCE TECH: CPT | Mod: 26,,, | Performed by: PATHOLOGY

## 2017-02-13 PROCEDURE — 85025 COMPLETE CBC W/AUTO DIFF WBC: CPT

## 2017-02-13 PROCEDURE — 83735 ASSAY OF MAGNESIUM: CPT

## 2017-02-13 PROCEDURE — 89051 BODY FLUID CELL COUNT: CPT

## 2017-02-13 PROCEDURE — 80053 COMPREHEN METABOLIC PANEL: CPT

## 2017-02-13 PROCEDURE — 94761 N-INVAS EAR/PLS OXIMETRY MLT: CPT

## 2017-02-13 PROCEDURE — 87205 SMEAR GRAM STAIN: CPT

## 2017-02-13 PROCEDURE — 82945 GLUCOSE OTHER FLUID: CPT

## 2017-02-13 RX ORDER — GENTAMICIN SULFATE 1 MG/G
CREAM TOPICAL 2 TIMES DAILY
Qty: 30 G | Refills: 6 | Status: SHIPPED | OUTPATIENT
Start: 2017-02-13 | End: 2017-02-23

## 2017-02-13 RX ORDER — MAGNESIUM SULFATE HEPTAHYDRATE 40 MG/ML
2 INJECTION, SOLUTION INTRAVENOUS ONCE
Status: COMPLETED | OUTPATIENT
Start: 2017-02-13 | End: 2017-02-13

## 2017-02-13 RX ORDER — LISINOPRIL 5 MG/1
10 TABLET ORAL DAILY
Status: DISCONTINUED | OUTPATIENT
Start: 2017-02-14 | End: 2017-02-13 | Stop reason: HOSPADM

## 2017-02-13 RX ORDER — POTASSIUM CHLORIDE 20 MEQ/1
20 TABLET, EXTENDED RELEASE ORAL DAILY
Status: DISCONTINUED | OUTPATIENT
Start: 2017-02-13 | End: 2017-02-13 | Stop reason: HOSPADM

## 2017-02-13 RX ORDER — LISINOPRIL 10 MG/1
10 TABLET ORAL DAILY
Qty: 90 TABLET | Refills: 3 | Status: SHIPPED | OUTPATIENT
Start: 2017-02-14 | End: 2018-02-14

## 2017-02-13 RX ADMIN — AMLODIPINE BESYLATE 5 MG: 5 TABLET ORAL at 09:02

## 2017-02-13 RX ADMIN — MAGNESIUM SULFATE IN WATER 2 G: 40 INJECTION, SOLUTION INTRAVENOUS at 10:02

## 2017-02-13 RX ADMIN — METOPROLOL SUCCINATE 25 MG: 25 TABLET, FILM COATED, EXTENDED RELEASE ORAL at 09:02

## 2017-02-13 RX ADMIN — ALLOPURINOL 100 MG: 100 TABLET ORAL at 09:02

## 2017-02-13 RX ADMIN — HEPARIN SODIUM 5000 UNITS: 5000 INJECTION, SOLUTION INTRAVENOUS; SUBCUTANEOUS at 05:02

## 2017-02-13 RX ADMIN — POTASSIUM CHLORIDE 20 MEQ: 1500 TABLET, EXTENDED RELEASE ORAL at 03:02

## 2017-02-13 NOTE — PLAN OF CARE
Problem: Patient Care Overview  Goal: Plan of Care Review  Room air SpO2  97 %. Pt with no apparent distress noted. Will continue to monitor.

## 2017-02-13 NOTE — PLAN OF CARE
Problem: Patient Care Overview  Goal: Plan of Care Review  Safety precautions maintained. Call bell in reach. Non skid socks on. Bed locked and in lowest position. Instructed pt to call for assistance. Pt verbalizes understanding.

## 2017-02-13 NOTE — PLAN OF CARE
Pt lying in bed sleeping. No acute distress noted. Peritoneal dialysis in progress. Bed alarm on, call light in reach, fall precautions in place. Report given to oncoming nurse.

## 2017-02-13 NOTE — PROGRESS NOTES
[facility] Medicine Resident [facility]OH[facility]I Progress Note    Subjective:      No complaints this AM; tolerating dialysis without complaints or complications. States cp is improved, denies sob, abd pain, n/v. Paracentesis performed yesterday tolerated well without complications.     Objective:   Last 24 Hour Vital Signs:  BP  Min: 122/58  Max: 162/76  Temp  Av.1 °F (36.7 °C)  Min: 97.5 °F (36.4 °C)  Max: 98.4 °F (36.9 °C)  Pulse  Av  Min: 80  Max: 92  Resp  Av.3  Min: 18  Max: 20  SpO2  Av.6 %  Min: 97 %  Max: 98 %  I/O last 3 completed shifts:  In: 600 [P.O.:600]  Out: 400 [Urine:400]    Physical Examination:  Gen: nad, lying in bed   HEENT: ncat  perrla, eomi, mmm  CV: rrr  no murmurs  2+ radial and dp pulses b/l  Resp: decreased breath sounds at right base (unchanged from previous), cta in all other lung fields   Abd: s/nd/nt  normoactive bs  Ext: warm to touch  no cyanosis or edema  Neuro: AAOx3  no focal deficits      Laboratory:  Laboratory Data Reviewed: yes  Pertinent Findings:  WBC 5.52  H/H 10.9/33.8  Plt 161    Na 139  K 3.5  Cl 103  HCO3 27  BUN 21  Cr 5.7  Glu 196        Microbiology Data Reviewed: yes  Pertinent Findings:  Peritoneal fluid (): WBC 48 with gram stain showing no bacteria  BCx (): ngtd    Other Results:    Radiology Data Reviewed: yes  Pertinent Findings:  CT abd/pelvis ():   1. Bilateral nonobstructing nephrolithiasis, grossly stable in the burden when compared to most recent CT dated 16.    2. Grossly stable small to moderate size right pleural effusion with interval decrease left-sided pleural effusion, now trace.    3. Peritoneal dialysis catheter with interval increased moderate amount of free fluid throughout the abdomen and pelvis that may be related to dialysis versus worsening nonspecific ascites.    4. Apparent circumferential wall thickening of the urinary bladder which may be related to under distention versus cystitis.    5.  Prostatomegaly.    6. Previous supraumbilical ventral abdominal wall gas and fluid collections have decreased in size with resolution of gas, now with localized stranding without definite loculation, suggesting scarring. Correlate clinically.      Current Medications:     Infusions:        Scheduled:   allopurinol  100 mg Oral Daily    amlodipine  5 mg Oral Daily    aspirin  81 mg Oral Daily    clopidogrel  75 mg Oral Daily    gabapentin  300 mg Oral QHS    heparin (porcine)  5,000 Units Subcutaneous Q8H    insulin detemir  10 Units Subcutaneous QHS    metoprolol succinate  25 mg Oral Daily    pravastatin  40 mg Oral Daily    senna-docusate 8.6-50 mg  1 tablet Oral BID    vitamin renal formula (B-complex-vitamin c-folic acid)  1 capsule Oral Daily        PRN:  benzonatate, dextrose 50%, dextrose 50%, glucagon (human recombinant), glucose, glucose, HYDROmorphone, insulin aspart, ondansetron    Antibiotics and Day Number of Therapy:  Cefepime (2/12)  Vanc (2/12)    Lines and Day Number of Therapy:  PD cath  PIV    Assessment:     Terry Cote is a 67 y.o.male with  Patient Active Problem List    Diagnosis Date Noted    Pleural effusion, right 02/12/2017    Hyperlipidemia 02/12/2017    History of stroke 02/12/2017    Right-sided chest pain 02/12/2017    SBP (spontaneous bacterial peritonitis) 11/03/2016    Pleural effusion 09/28/2016    Dysphagia 07/15/2016    Syncope 06/07/2016    Anemia of chronic disease 03/15/2016    Essential hypertension 01/03/2016    Coronary artery disease involving native coronary artery of native heart without angina pectoris 01/03/2016    Pharyngoesophageal dysphagia 12/08/2015    Cerebral infarct     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 08/25/2015    Hand muscle atrophy 08/25/2015    Vitamin D deficiency 08/22/2015    Type 2 diabetes mellitus with diabetic chronic kidney disease 08/21/2015    (HFpEF) heart failure with preserved ejection  fraction 08/19/2015    Elevated PSA 08/19/2015    Transient alteration of awareness 08/19/2015    Neuropathy 07/23/2015    DM type 2 causing renal disease 07/23/2015    ESRD on peritoneal dialysis 07/23/2015    Stroke     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 05/14/2015    Organ transplant candidate 04/22/2015    Pre-transplant evaluation for chronic kidney disease 04/22/2015    Type 2 diabetes mellitus for many yrs about 25 yrs 04/22/2015    Diabetic nephropathy 04/22/2015    Secondary hyperparathyroidism     Anemia in chronic kidney disease     ESRD on dialysis 03/20/2015    Diabetes mellitus, type 2 01/23/2015    Dyslipidemia associated with type 2 diabetes mellitus 01/23/2015    Hypertension associated with diabetes 01/23/2015    Hyperuricemia 01/23/2015    ESRD (end stage renal disease) on dialysis 01/16/2015     Class: Acute    CHF with left ventricular diastolic dysfunction, NYHA class 1 12/19/2014    Unspecified transient cerebral ischemia 12/18/2014        Plan:     Right Pleural Effusion  - CXR with evidence of R pleural effusion  - CT abdomen with small to moderate size right pleural effusion as well, bilateral nonobstructing nephrolithiasis that is stable, PD catheter with interval increased moderate amount of free fluid throughout the abdomen and pelvis that may be related to dialysis versus worsening nonspecific ascites  - unsure of etiology. No leukocytosis or bandemia, afebrile, hemodynamically stable. Increased lactate to 3.3, but could also be elevated due to poor renal function.  - IR consulted for thoracentesis. Fluid studies include: cell count with diff, Gram stain, cytology, glucose, LDH, protein, albumin  thoracentesis to be performed today  - previous episodes of pleural effusion with similar symptoms  - pain control with hydromorphone PRN  - will continue to monitor     ESRD  - Cr at baseline per records  -  Cont PD nightly for 9 hrs per patient  -  Nephrology consulted, and recommended vanc and cefepime if peritoneal fluid studies indicate infection  no infection seen on fluid studies so will not continue abx     DM2  - Levemir 10u nightly  - will continue to monitor blood glucose for changing insulin requirements     HTN  - continue home antihypertensives     HFpEF  - continue Toporol XL  - most recent echo with normal EF and LV diastolic dysfunction     H/o Stroke  - continue ASA, plavix, statin  - no acute neurologic changes     Constipation  - last BM day before admission  - do not believe pain is 2/2 to constipation or acute abdominal process  - continue home bowel regimen     HLD  - continue home statin     Anemia of Chronic Disease  - worked up 11/2016  - likely 2/2 chronic renal disease    Code: full  Diet: renal  DVT: heparin  Dispo: home today if tolerates pleural effusion tap and fluid studies on pleural effusion shows no evidence of infection    Theodore Lunsford  [facility] Internal Medicine [facility] medicine Service Team A    [facility] Medicine Hospitalist Pager numbers:   [facility] Hospitalist Medicine Team A (Roxane/Aide): [phone]  [facility] Hospitalist Medicine Team B (Mirna/Felipe):  [phone]

## 2017-02-13 NOTE — PROGRESS NOTES
TN met with pt and wife earlier to day. Pt voices no discharge needs. Pt  had thoracentesis. Possible discharge later today per team. TN attempted to schedule PCP follow up but had to leave message for follow up within a week.

## 2017-02-13 NOTE — PLAN OF CARE
Problem: Patient Care Overview  Goal: Plan of Care Review  Outcome: Ongoing (interventions implemented as appropriate)  Nurse reviewed plan of care with patient. Nurse informed patient on proper pain management techniques. Patient was instructed to notify nurse at the first onset of pain. Patient verbalized understanding.

## 2017-02-13 NOTE — PROCEDURES
Radiology Post-Procedure Note    Pre Op Diagnosis: R pleural effusion  Post Op Diagnosis: Same    Procedure: thoracentesis    Procedure performed by: Jens    Written Informed Consent Obtained: Yes  Specimen Removed: YES 0.9 L serosanguinous effute  Estimated Blood Loss: Minimal    Findings:   Successful R thoracentesis.    Patient tolerated procedure well.    @SIG@

## 2017-02-13 NOTE — PLAN OF CARE
Pt returned to unit via hospital bed with NANCY Billingsley and NANCY Campos. bandaid on R side of back clean, dry, and intact. Pt denies any pain.

## 2017-02-13 NOTE — PLAN OF CARE
Discharge instructions reviewed with pt and pt's wife. Pt and pt's wife verbalize understanding. PIV discontinued as per md order. Tip intact. Pt tolerated well. Telemetry discontinued.

## 2017-02-13 NOTE — CONSULTS
Consult/H&P Note  Interventional Radiology    Consult Requested By: surgery    Reason for Consult: pleural effusion    SUBJECTIVE:     Chief Complaint:  effusion    History of Present Illness: 68 yo male with recurrent R pleural effusion    Past Medical History   Diagnosis Date    Anemia in chronic kidney disease     Arthritis      unable to walk due to arthritis in knees.     Coronary artery disease     Diabetes mellitus     Diabetes mellitus, type 2     Diabetic nephropathy 4/22/2015    Diastolic dysfunction     Hypertension     Myocardial infarction 2001    Peritoneal dialysis catheter in place 2014    Secondary hyperparathyroidism     Stroke 2001, 2013, 10/2014     weak, slurred speech, equal hand grasp/equal leg movements    Type 2 diabetes mellitus for many yrs about 25 yrs 4/22/2015    Unspecified disorder of kidney and ureter      Past Surgical History   Procedure Laterality Date    Cataract extraction      Neck surgery      Spine surgery      Peritoneal catheter insertion      Av fistula placement Left 2011     wrist;    Portacath placement  march 2016     left chest     Family History   Problem Relation Age of Onset    Cancer Mother     Cancer Father     No Known Problems Sister     No Known Problems Brother     No Known Problems Sister     No Known Problems Sister     No Known Problems Brother      Social History   Substance Use Topics    Smoking status: Never Smoker    Smokeless tobacco: Never Used    Alcohol use No       Review of Systems:  As per H&P      OBJECTIVE:     Vital Signs Range (Last 24H):  Temp:  [97.5 °F (36.4 °C)-98.4 °F (36.9 °C)]   Pulse:  [80-92]   Resp:  [18-20]   BP: (122-162)/(58-76)   SpO2:  [97 %-98 %]     Physical Exam:  General- Patient alert and oriented x3 in NAD  ENT- PERRLA,  Neck- No masses  CV- Regular rate and rhythm  Resp-  No increased WOB  GI- Non tender/non-distended  Extrem- No cyanosis, clubbing, edema.   Derm- No rashes, masses, or  lesions noted  Neuro-  No focal deficits noted.     Physical Exam  Body mass index is 24.98 kg/(m^2).    Scheduled Meds:    allopurinol  100 mg Oral Daily    amlodipine  5 mg Oral Daily    aspirin  81 mg Oral Daily    clopidogrel  75 mg Oral Daily    gabapentin  300 mg Oral QHS    heparin (porcine)  5,000 Units Subcutaneous Q8H    insulin detemir  10 Units Subcutaneous QHS    metoprolol succinate  25 mg Oral Daily    pravastatin  40 mg Oral Daily    senna-docusate 8.6-50 mg  1 tablet Oral BID    vitamin renal formula (B-complex-vitamin c-folic acid)  1 capsule Oral Daily     Continuous Infusions:    PRN Meds:benzonatate, dextrose 50%, dextrose 50%, glucagon (human recombinant), glucose, glucose, HYDROmorphone, insulin aspart, ondansetron    Allergies: Review of patient's allergies indicates:  No Known Allergies    Labs:    Recent Labs  Lab 02/11/17 2122   INR 1.1       Recent Labs  Lab 02/13/17  0306   WBC 5.52   HGB 10.9*   HCT 33.8*   MCV 83         Recent Labs  Lab 02/13/17  0306   *      K 3.5      CO2 27   BUN 21   CREATININE 5.7*   CALCIUM 8.3*   MG 1.5*   ALT 15   AST 15   ALBUMIN 2.2*   BILITOT 0.2       Vitals (Most Recent):  Temp: 98.1 °F (36.7 °C) (02/13/17 0800)  Pulse: 84 (02/13/17 0800)  Resp: 20 (02/13/17 0800)  BP: (!) 142/70 (02/13/17 0800)  SpO2: 98 % (02/13/17 0556)    ASA: 3  Mallampati: 2    Consent obtained    ASSESSMENT/PLAN:     R thoracentesis.    Active Hospital Problems    Diagnosis  POA    *Pleural effusion, right [J90]  Yes    Hyperlipidemia [E78.5]  Yes     Chronic    History of stroke [Z86.73]  Not Applicable    Right-sided chest pain [R07.9]  Yes    Anemia of chronic disease [D63.8]  Yes    Essential hypertension [I10]  Yes    (HFpEF) heart failure with preserved ejection fraction [I50.30]  Yes    Diabetes mellitus, type 2 [E11.9]  Yes    ESRD (end stage renal disease) on dialysis [N18.6, Z99.2]  Not Applicable     He is PD   He has L  radial-cephalic AVF        Resolved Hospital Problems    Diagnosis Date Resolved POA   No resolved problems to display.           Domingo Colindres MD

## 2017-02-13 NOTE — TELEPHONE ENCOUNTER
Called patient to schedule hospital f/u. Patient is still hospitalized. He stated he will call back once he finds transportation.

## 2017-02-13 NOTE — PLAN OF CARE
02/13/17 1611   Final Note   Assessment Type Final Discharge Note   Discharge Disposition Home   Discharge planning education complete? Yes   What phone number can be called within the next 1-3 days to see how you are doing after discharge? [phone]   Hospital Follow Up  Appt(s) scheduled? (TN left messages for PCP, Neph, and Pulmonary; TN informed pt and wife to call  offices by Wednesday if they don't receive call back)   Offered [facility] -- Bedside Delivery? Yes   Discharge[facility]Hospital [facility] P[facility] n/a   Referral to Outpatient Case Management complete? n/a   Referral to / orders for Home Health Complete? n/a   30 day supply of medicines given at discharge, if documented non-compliance / non-adherence? n/a   Any social issues identified prior to discharge? No   Right Care Referral Info   Post Acute Recommendation No Care

## 2017-02-14 ENCOUNTER — TELEPHONE (OUTPATIENT)
Dept: FAMILY MEDICINE | Facility: CLINIC | Age: 67
End: 2017-02-14

## 2017-02-14 LAB
ALBUMIN FLD-MCNC: 1.7 G/DL
BODY FLUID SOURCE, LDH: NORMAL
GLUCOSE FLD-MCNC: 218 MG/DL
LDH FLD L TO P-CCNC: 177 U/L
PROT FLD-MCNC: 3.8 G/DL
SPECIMEN SOURCE: NORMAL

## 2017-02-14 NOTE — TELEPHONE ENCOUNTER
Returned patients call. He was calling to schedule an appt for a followup. Patient is now scheduled

## 2017-02-14 NOTE — TELEPHONE ENCOUNTER
----- Message from Deanne Brock sent at 2/14/2017 11:26 AM CST -----  Contact: [phone]/self  Patient called in requesting to speak with you. Did not specify why. Please advise.

## 2017-02-15 ENCOUNTER — TELEPHONE (OUTPATIENT)
Dept: FAMILY MEDICINE | Facility: CLINIC | Age: 67
End: 2017-02-15

## 2017-02-15 LAB
BACTERIA FLD AEROBE CULT: NO GROWTH
GRAM STN SPEC: NORMAL

## 2017-02-15 NOTE — DISCHARGE SUMMARY
[facility] Medicine Discharge Summary    Primary Team: [facility] Medicine Team A  Attending Physician: Aide  Resident: Jackie  Intern: Jonn    Date of Admit: 2/11/2017  Date of Discharge: 2/13/2017  Discharge to: home  Condition: stable    Discharge Diagnoses     Patient Active Problem List   Diagnosis    Unspecified transient cerebral ischemia    CHF with left ventricular diastolic dysfunction, NYHA class 1    ESRD (end stage renal disease) on dialysis    Diabetes mellitus, type 2    Dyslipidemia associated with type 2 diabetes mellitus    Hypertension associated with diabetes    Hyperuricemia    ESRD on dialysis    Organ transplant candidate    Pre-transplant evaluation for chronic kidney disease    Type 2 diabetes mellitus for many yrs about 25 yrs    Diabetic nephropathy    Secondary hyperparathyroidism    Anemia in chronic kidney disease    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    Neuropathy    DM type 2 causing renal disease    ESRD on peritoneal dialysis    Stroke    (HFpEF) heart failure with preserved ejection fraction    Elevated PSA    Transient alteration of awareness    Type 2 diabetes mellitus with diabetic chronic kidney disease    Vitamin D deficiency    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    Hand muscle atrophy    Cerebral infarct    Pharyngoesophageal dysphagia    Essential hypertension    Coronary artery disease involving native coronary artery of native heart without angina pectoris    Anemia of chronic disease    Syncope    Dysphagia    Pleural effusion    SBP (spontaneous bacterial peritonitis)    Pleural effusion, right    Hyperlipidemia    History of stroke    Right-sided chest pain       Consultants and Procedures     Consultants:  Nephrology  Interventional Radiology    Procedures:   R thoracentesis    Brief History of Present Illness      Terry Cote is a 67 y.o. male who  has a past medical  history of ESRD on daily PD, DM2 with neuropathy; CAD, Hypertension; Myocardial infarction (2001); Stroke (2001, 2014), and constipation. The patient presented to [facility] on 2/11/2017 with a primary complaint of right sided flank pain for the past week that acutely worsened on the day of presentation. He describes the pain as constant and stabbing, non-radiating, worse with movement. He states he has experienced pain similar to this a couple of months ago when he was told he had fluid on his lung that needed to be drained. After getting this fluid drained in the past his pain subsided. He was at his PCP earlier this week and was told there was fluid on his right lower lung. He has record of two thoracenteses last year.   He denies fever, chills, SOB, CP, pleuritic pain, nausea, vomiting, diarrhea, productive cough. He is not anuric, but makes very little urine he says. His nephrologist is Dr. Hand. His last BM was day before admission and normal, + flatus. He takes laxatives at home PRN constipation. He uses a wheelchair.     For the full HPI please refer to the History & Physical from this admission.    Hospital Course By Problem with Pertinent Findings     Right Pleural Effusion  - CXR with evidence of R pleural effusion  - CT abdomen with small to moderate size right pleural effusion as well, bilateral nonobstructing nephrolithiasis that is stable, PD catheter with interval increased moderate amount of free fluid throughout the abdomen and pelvis that may be related to dialysis versus worsening nonspecific ascites  - unsure of etiology. No leukocytosis or bandemia, afebrile, hemodynamically stable. Increased lactate to 3.3, but could also be elevated due to poor renal function.  - IR consulted for thoracentesis. Fluid studies include: cell count with diff, Gram stain, cytology, glucose, LDH, protein, albumin  thoracentesis performed with initial studies showing 2259 WBC, 74% lymphs,  19% mono, 6% eos, 1% segs  similar diff to previous thoracentesis studies  - previous episodes of pleural effusion with similar symptoms  - pain well controlled with hydromorphone PRN before thoracentesis, pain greatly alleviated after procedure  - will follow up complete thoracentesis studies for any indications of malignancy or any other disease      ESRD  - Cr at baseline per records  - Continued PD nightly for 9 hrs without acute issues  - Nephrology consulted, and recommended vanc and cefepime if peritoneal fluid studies indicate infection, initially provided empiric coverage and discontinued when no infection seen on fluid studies      DM2  - provided Levemir 10u nightly  A1c 5.5  - monitored blood glucose for changing insulin requirements and discharged on insulin glargine 20u nightly and aspart 5u with meals      HTN  - continued home antihypertensives during stay without acute issues  - discharged on home meds      HFpEF  - continued Toporol XL without acute issues  - most recent echo with normal EF and LV diastolic dysfunction  - discharged on home meds      H/o Stroke  - continued ASA, plavix, statin during stay  - no acute neurologic changes during stay  - discharged on home meds      Constipation  - last BM day before admission  - continued home bowel regimen without acute issues  - discharged on home meds      HLD  - continued home statin during stay  - discharged on home meds      Anemia of Chronic Disease  - worked up 11/2016  - likely 2/2 chronic renal disease    Discharge Medications        Medication List      START taking these medications          gentamicin 0.1 % cream   Commonly known as:  GARAMYCIN   Apply topically 2 (two) times daily. To PD cath site       lisinopril 10 MG tablet   Take 1 tablet (10 mg total) by mouth once daily.         CHANGE how you take these medications          gabapentin 300 MG capsule   Commonly known as:  NEURONTIN   Take 1 capsule (300 mg total) by mouth every  evening.   What changed:  when to take this       insulin aspart protamine-insulin aspart 100 unit/mL (70-30) Inpn pen   Commonly known as:  NovoLOG 70/30   Inject 5 Units into the skin 3 (three) times daily with meals.   What changed:  how much to take       ondansetron 4 MG Tbdl   Commonly known as:  ZOFRAN-ODT   What changed:  Another medication with the same name was removed. Continue taking this medication, and follow the directions you see here.         CONTINUE taking these medications          allopurinol 100 MG tablet   Commonly known as:  ZYLOPRIM   Take 1 tablet (100 mg total) by mouth once daily.       aspirin 81 MG EC tablet   Commonly known as:  ECOTRIN   Take 1 tablet (81 mg total) by mouth once daily.       clopidogrel 75 mg tablet   Commonly known as:  PLAVIX       docusate sodium 100 MG capsule   Commonly known as:  COLACE   Take 1 capsule (100 mg total) by mouth 2 (two) times daily as needed for Constipation.       EX-LAX (SENNOSIDES) ORAL       insulin glargine 100 unit/mL (3 mL) Inpn pen   Commonly known as:  LANTUS SOLOSTAR   Inject 20 Units into the skin every evening.       metoprolol succinate 25 MG 24 hr tablet   Commonly known as:  TOPROL-XL   Take 1 tablet (25 mg total) by mouth once daily.       NYSTOP powder   Generic drug:  nystatin       potassium chloride SA 20 MEQ tablet   Commonly known as:  K-DUR,KLOR-CON       pravastatin 80 MG tablet   Commonly known as:  PRAVACHOL       senna-docusate 8.6-50 mg 8.6-50 mg per tablet   Commonly known as:  PERICOLACE   Take 1 tablet by mouth 2 (two) times daily.       vitamin renal formula (B-complex-vitamin c-folic acid) 1 mg Cap   Commonly known as:  NEPHROCAP   Take 1 capsule by mouth once daily.         STOP taking these medications          amlodipine 5 MG tablet   Commonly known as:  NORVASC       benzonatate 100 MG capsule   Commonly known as:  TESSALON            Where to Get Your Medications      You can get these medications from any  pharmacy     Bring a paper prescription for each of these medications     gentamicin 0.1 % cream    lisinopril 10 MG tablet             Discharge Information:   Diet:  renal    Physical Activity:  As tolerated    Instructions:  1. Take all medications as prescribed  2. Keep all follow-up appointments  3. Return to the hospital or call your primary care physicians if any worsening symptoms such as fever/chills/sweats, chest pain, shortness of breath, n/v, abd pain, decreased appetite, change in weight, diarrhea, constipation occur.      Follow-Up Appointments:  Follow-up Information     Follow up with Duncan Rincon MD In 1 week.    Specialty:  Internal Medicine    Why:  message left for follow up within a week    Contact information:    [email or web address]  [address]  [place] [address] [phone]          Follow up with Eileen Hand MD In 2 weeks.    Specialty:  Nephrology    Why:  For follow up from recent hospitalization    Contact information:    [address]  [address]  [place] [id number] [phone]          Follow up with Josselyn Lopes MD.    Specialty:  Pulmonary Disease    Why:  For follow up from recent hospitalization; message left for follow up    Contact information:    [address]  [address]  [place] [id number] [phone]              Theodore Lunsford  [facility] Internal Medicine, -I

## 2017-02-15 NOTE — TELEPHONE ENCOUNTER
----- Message from Daphne Skinner sent at 2/15/2017  1:37 PM CST -----  Contact: self/[phone] or [phone]  He like to come in the morning on 12/21 if he can.

## 2017-02-16 LAB
BACTERIA FLD AEROBE CULT: NO GROWTH
GRAM STN SPEC: NORMAL
GRAM STN SPEC: NORMAL

## 2017-02-17 LAB
BACTERIA BLD CULT: NORMAL
BACTERIA BLD CULT: NORMAL

## 2017-02-21 ENCOUNTER — OFFICE VISIT (OUTPATIENT)
Dept: FAMILY MEDICINE | Facility: CLINIC | Age: 67
End: 2017-02-21
Payer: MEDICARE

## 2017-02-21 VITALS
SYSTOLIC BLOOD PRESSURE: 118 MMHG | BODY MASS INDEX: 25.06 KG/M2 | HEIGHT: 71 IN | DIASTOLIC BLOOD PRESSURE: 66 MMHG | HEART RATE: 83 BPM | OXYGEN SATURATION: 98 % | TEMPERATURE: 98 F | WEIGHT: 179 LBS

## 2017-02-21 DIAGNOSIS — E11.59 HYPERTENSION ASSOCIATED WITH DIABETES: ICD-10-CM

## 2017-02-21 DIAGNOSIS — I25.10 CORONARY ARTERY DISEASE INVOLVING NATIVE CORONARY ARTERY OF NATIVE HEART WITHOUT ANGINA PECTORIS: ICD-10-CM

## 2017-02-21 DIAGNOSIS — N18.6 ESRD (END STAGE RENAL DISEASE) ON DIALYSIS: ICD-10-CM

## 2017-02-21 DIAGNOSIS — J90 PLEURAL EFFUSION, RIGHT: ICD-10-CM

## 2017-02-21 DIAGNOSIS — E11.29 TYPE 2 DIABETES MELLITUS WITH OTHER KIDNEY COMPLICATION, UNSPECIFIED LONG TERM INSULIN USE STATUS: ICD-10-CM

## 2017-02-21 DIAGNOSIS — E11.22 TYPE 2 DIABETES MELLITUS WITH CHRONIC KIDNEY DISEASE ON CHRONIC DIALYSIS, WITHOUT LONG-TERM CURRENT USE OF INSULIN: ICD-10-CM

## 2017-02-21 DIAGNOSIS — E11.69 DYSLIPIDEMIA ASSOCIATED WITH TYPE 2 DIABETES MELLITUS: ICD-10-CM

## 2017-02-21 DIAGNOSIS — Z99.2 TYPE 2 DIABETES MELLITUS WITH CHRONIC KIDNEY DISEASE ON CHRONIC DIALYSIS, WITHOUT LONG-TERM CURRENT USE OF INSULIN: ICD-10-CM

## 2017-02-21 DIAGNOSIS — I63.9 CEREBROVASCULAR ACCIDENT (CVA), UNSPECIFIED MECHANISM: ICD-10-CM

## 2017-02-21 DIAGNOSIS — E78.5 DYSLIPIDEMIA ASSOCIATED WITH TYPE 2 DIABETES MELLITUS: ICD-10-CM

## 2017-02-21 DIAGNOSIS — J90 PLEURAL EFFUSION: ICD-10-CM

## 2017-02-21 DIAGNOSIS — Z09 HOSPITAL DISCHARGE FOLLOW-UP: Primary | ICD-10-CM

## 2017-02-21 DIAGNOSIS — N18.6 TYPE 2 DIABETES MELLITUS WITH CHRONIC KIDNEY DISEASE ON CHRONIC DIALYSIS, WITH LONG-TERM CURRENT USE OF INSULIN: ICD-10-CM

## 2017-02-21 DIAGNOSIS — D63.8 ANEMIA OF CHRONIC DISEASE: ICD-10-CM

## 2017-02-21 DIAGNOSIS — N18.6 TYPE 2 DIABETES MELLITUS WITH CHRONIC KIDNEY DISEASE ON CHRONIC DIALYSIS, WITHOUT LONG-TERM CURRENT USE OF INSULIN: ICD-10-CM

## 2017-02-21 DIAGNOSIS — Z79.4 TYPE 2 DIABETES MELLITUS WITH CHRONIC KIDNEY DISEASE ON CHRONIC DIALYSIS, WITH LONG-TERM CURRENT USE OF INSULIN: ICD-10-CM

## 2017-02-21 DIAGNOSIS — Z99.2 ESRD (END STAGE RENAL DISEASE) ON DIALYSIS: ICD-10-CM

## 2017-02-21 DIAGNOSIS — N25.81 SECONDARY HYPERPARATHYROIDISM: ICD-10-CM

## 2017-02-21 DIAGNOSIS — I15.2 HYPERTENSION ASSOCIATED WITH DIABETES: ICD-10-CM

## 2017-02-21 DIAGNOSIS — N18.6 ESRD ON PERITONEAL DIALYSIS: ICD-10-CM

## 2017-02-21 DIAGNOSIS — E11.9 TYPE 2 DIABETES MELLITUS WITHOUT COMPLICATION, WITHOUT LONG-TERM CURRENT USE OF INSULIN: ICD-10-CM

## 2017-02-21 DIAGNOSIS — Z99.2 TYPE 2 DIABETES MELLITUS WITH CHRONIC KIDNEY DISEASE ON CHRONIC DIALYSIS, WITH LONG-TERM CURRENT USE OF INSULIN: ICD-10-CM

## 2017-02-21 DIAGNOSIS — E11.22 TYPE 2 DIABETES MELLITUS WITH CHRONIC KIDNEY DISEASE ON CHRONIC DIALYSIS, WITH LONG-TERM CURRENT USE OF INSULIN: ICD-10-CM

## 2017-02-21 DIAGNOSIS — Z99.2 ESRD ON PERITONEAL DIALYSIS: ICD-10-CM

## 2017-02-21 PROCEDURE — 99999 PR PBB SHADOW E&M-EST. PATIENT-LVL IV: CPT | Mod: PBBFAC,,, | Performed by: FAMILY MEDICINE

## 2017-02-21 PROCEDURE — 99496 TRANSJ CARE MGMT HIGH F2F 7D: CPT | Mod: PBBFAC,PO | Performed by: FAMILY MEDICINE

## 2017-02-21 PROCEDURE — 99214 OFFICE O/P EST MOD 30 MIN: CPT | Mod: PBBFAC,PO | Performed by: FAMILY MEDICINE

## 2017-02-21 RX ORDER — CLOPIDOGREL BISULFATE 75 MG/1
75 TABLET ORAL DAILY
Qty: 90 TABLET | Refills: 3 | Status: SHIPPED | OUTPATIENT
Start: 2017-02-21

## 2017-02-21 NOTE — PROGRESS NOTES
Transitional Care Note  Subjective:       Patient ID: Terry Cote is a 67 y.o. male.  Chief Complaint: Follow-up and Back Pain (dull pain on right side of back)    Family and/or Caretaker present at visit?  Yes.  Diagnostic tests reviewed/disposition: I have reviewed all completed as well as pending diagnostic tests at the time of discharge.  Disease/illness education: yes  Home health/community services discussion/referrals: Patient does not have home health established from hospital visit.  They do not need home health.  If needed, we will set up home health for the patient.   Establishment or re-establishment of referral orders for community resources: No other necessary community resources.   Discussion with other health care providers: No discussion with other health care providers necessary.   HPI Comments: 66 yr old pleasant black male with diabetes mellitus II, hypertension, hyperlipidemia, CVA left, ESRD on dialysis, diastolic dysfunction type 1, hyperuricemia, OA, presents today for his post hospital discharge follow up.    Right pleural effusion and right sided chest wall and abdominal pain - he went to ER on 2/11 and stayed for 2 days and had therapeutic and diagnostic thoracentesis - he feels better with that however still has abdominal pain - he has peritoneal dialysis nightly at home - no fever or chills - follows nephrology and also has pulmonology appointment with [facility] soon.    1. ESRD - He is doing peritoneal dialysis. He established with nephrology. He is still making urine and has no new issues with that. He was seen twice prior to this visit for suspected stroke symptoms and weakness and discharged. He has generalized weakness and he did PT/OT and it helped. He has home health PT now.     2. DM II - chronic and currently controlled - follows endocrinology - no symptoms and issues    3. HTN - currently controlled - on CCB daily - no side effects      4. HLD - reports controlled - labs due  - takes statin daily - no side effects    5. CVA left - he had stroke a while ago and he had very mild left sided weakness - he is on Aspirin since then - no problem ambulating    6. Diastolic heart failure - on medical management - EF normal - no symptoms of chest pain, SOB, or pedal edema    7. Constipation - chronic - tried some OTC meds and nothing works - he is on pain medication for rib pain and it is making it worse    History as below - reviewed         Back Pain   Pertinent negatives include no chest pain, headaches or weakness.   Diabetes   He presents for his follow-up diabetic visit. He has type 2 diabetes mellitus. His disease course has been stable. Pertinent negatives for hypoglycemia include no dizziness, headaches, hunger, nervousness/anxiousness, speech difficulty or sweats. Pertinent negatives for diabetes include no chest pain, no foot paresthesias, no foot ulcerations, no polyuria and no weakness. Pertinent negatives for hypoglycemia complications include no blackouts, no hospitalization, no nocturnal hypoglycemia, no required assistance and no required glucagon injection. Symptoms are stable. Diabetic complications include a CVA, heart disease and nephropathy. Pertinent negatives for diabetic complications include no impotence, peripheral neuropathy, PVD or retinopathy. Risk factors for coronary artery disease include diabetes mellitus, dyslipidemia, hypertension, male sex and sedentary lifestyle. Current diabetic treatment includes insulin injections. He is compliant with treatment all of the time. He is following a generally healthy diet. Meal planning includes avoidance of concentrated sweets. He rarely participates in exercise. An ACE inhibitor/angiotensin II receptor blocker is not being taken. He does not see a podiatrist.Eye exam is not current.   Hypertension   This is a chronic problem. The current episode started more than 1 year ago. The problem has been gradually improving since  onset. The problem is controlled. Pertinent negatives include no anxiety, chest pain, headaches, palpitations, peripheral edema, shortness of breath or sweats. There are no associated agents to hypertension. Risk factors for coronary artery disease include diabetes mellitus, dyslipidemia, male gender and sedentary lifestyle. Past treatments include diuretics and calcium channel blockers. The current treatment provides significant improvement. There are no compliance problems.  Hypertensive end-organ damage includes kidney disease, CVA and heart failure. There is no history of left ventricular hypertrophy, PVD, renovascular disease, retinopathy or a thyroid problem. Identifiable causes of hypertension include chronic renal disease. There is no history of coarctation of the aorta, hypercortisolism, hyperparathyroidism or pheochromocytoma.   Hyperlipidemia   This is a chronic problem. The current episode started more than 1 year ago. The problem is controlled. Recent lipid tests were reviewed and are normal. Exacerbating diseases include chronic renal disease and diabetes. He has no history of hypothyroidism, liver disease, obesity or nephrotic syndrome. Pertinent negatives include no chest pain, myalgias or shortness of breath. Current antihyperlipidemic treatment includes statins. The current treatment provides significant improvement of lipids. There are no compliance problems.  Risk factors for coronary artery disease include diabetes mellitus, dyslipidemia, hypertension, male sex and a sedentary lifestyle.   Cough   This is a new problem. The current episode started 1 to 4 weeks ago. The problem has been gradually improving. The problem occurs every few hours. The cough is non-productive. Pertinent negatives include no chest pain, ear pain, headaches, myalgias, rash, rhinorrhea, shortness of breath, sweats or wheezing. Nothing aggravates the symptoms. He has tried prescription cough suppressant for the symptoms.  The treatment provided significant relief. His past medical history is significant for environmental allergies. There is no history of asthma, bronchiectasis or bronchitis.     Review of Systems   Constitutional: Negative.  Negative for activity change, diaphoresis and unexpected weight change.   HENT: Negative.  Negative for congestion, ear pain, mouth sores, rhinorrhea and voice change.    Eyes: Negative.  Negative for pain, discharge and visual disturbance.   Respiratory: Negative.  Negative for apnea, cough, shortness of breath and wheezing.    Cardiovascular: Negative.  Negative for chest pain and palpitations.   Gastrointestinal: Negative.  Negative for abdominal distention, anal bleeding, diarrhea and vomiting.   Endocrine: Negative.  Negative for cold intolerance and polyuria.   Genitourinary: Negative.  Negative for decreased urine volume, difficulty urinating, discharge, frequency, impotence and scrotal swelling.   Musculoskeletal: Positive for back pain. Negative for myalgias and neck stiffness.   Skin: Negative.  Negative for color change and rash.   Allergic/Immunologic: Positive for environmental allergies. Negative for immunocompromised state.   Neurological: Negative.  Negative for dizziness, speech difficulty, weakness, light-headedness and headaches.   Hematological: Negative.    Psychiatric/Behavioral: Negative.  Negative for agitation, dysphoric mood and suicidal ideas. The patient is not nervous/anxious.        PMH/PSH/FH/SH/MED/ALLERGY reviewed    Objective:       Vitals:    02/21/17 1116   BP: 118/66   Pulse: 83   Temp: 97.6 °F (36.4 °C)       Physical Exam   Constitutional: He is oriented to person, place, and time. He appears well-developed and well-nourished. No distress.   HENT:   Head: Normocephalic and atraumatic.   Right Ear: External ear normal.   Left Ear: External ear normal.   Nose: Nose normal.   Mouth/Throat: Oropharynx is clear and moist. No oropharyngeal exudate.   Eyes:  Conjunctivae and EOM are normal. Pupils are equal, round, and reactive to light.   Neck: Normal range of motion. Neck supple. No JVD present. No tracheal deviation present. No thyromegaly present.   Cardiovascular: Normal rate, regular rhythm, normal heart sounds and intact distal pulses.  Exam reveals no gallop and no friction rub.    No murmur heard.  Pulmonary/Chest: Effort normal. No respiratory distress. He has decreased breath sounds in the right middle field and the right lower field. He has no wheezes. He has no rales. He exhibits no tenderness.   Abdominal: Soft. Bowel sounds are normal. He exhibits no distension and no mass. There is no tenderness. There is no rebound and no guarding.   PD catheter in place - site C/D/I   Musculoskeletal: Normal range of motion. He exhibits no edema or tenderness.   Lymphadenopathy:     He has no cervical adenopathy.   Neurological: He is alert and oriented to person, place, and time. He has normal reflexes. No cranial nerve deficit. He exhibits normal muscle tone. Coordination normal.   generalized weakness, 4/5 strength Left side and 5/5 on right   Skin: Skin is warm and dry. No rash noted. He is not diaphoretic. No erythema. No pallor.   Psychiatric: He has a normal mood and affect. His behavior is normal. Judgment and thought content normal.       Assessment:       1. Hospital discharge follow-up    2. Pleural effusion    3. Pleural effusion, right    4. Type 2 diabetes mellitus with other kidney complication, unspecified long term insulin use status    5. ESRD (end stage renal disease) on dialysis    6. ESRD on peritoneal dialysis    7. Type 2 diabetes mellitus with chronic kidney disease on chronic dialysis, without long-term current use of insulin    8. Coronary artery disease involving native coronary artery of native heart without angina pectoris    9. Hypertension associated with diabetes        Plan:           Terry was seen today for follow-up and back  pain.    Diagnoses and all orders for this visit:    Hospital discharge follow-up    Pleural effusion    Pleural effusion, right    Type 2 diabetes mellitus with other kidney complication, unspecified long term insulin use status    ESRD (end stage renal disease) on dialysis    ESRD on peritoneal dialysis    Type 2 diabetes mellitus with chronic kidney disease on chronic dialysis, without long-term current use of insulin    Coronary artery disease involving native coronary artery of native heart without angina pectoris  -     clopidogrel (PLAVIX) 75 mg tablet; Take 1 tablet (75 mg total) by mouth once daily.    Hypertension associated with diabetes      Hospital f/u  -feels better  -has Pulmonology f/u for P effusion    DM II  -controlled  -diet    HTN  -controlled  -on CCB    HLD  -controlled    Anemia of CKD  -stable    ESRD  -on PD  -follows Nephrology Dr Aerllano    CAD  -stable      Spent adequate time in obtaining history and explaining differentials, reviewing chart in detail    40 minutes spent during this visit of which greater than 50% devoted to face-face counseling and coordination of care regarding diagnosis and management plan    Return in about 3 months (around 5/21/2017), or if symptoms worsen or fail to improve.

## 2017-03-14 PROCEDURE — 99496 TRANSJ CARE MGMT HIGH F2F 7D: CPT | Mod: S$PBB,,, | Performed by: FAMILY MEDICINE

## 2017-03-27 ENCOUNTER — HOSPITAL ENCOUNTER (OUTPATIENT)
Dept: RADIOLOGY | Facility: HOSPITAL | Age: 67
Discharge: HOME | End: 2017-03-27
Attending: INTERNAL MEDICINE
Payer: MEDICARE

## 2017-03-27 DIAGNOSIS — J90 RECURRENT RIGHT PLEURAL EFFUSION: Primary | ICD-10-CM

## 2017-03-27 DIAGNOSIS — J90 PLEURAL EFFUSION: ICD-10-CM

## 2017-03-27 PROCEDURE — 71020 XR CHEST PA AND LATERAL: CPT | Mod: TC

## 2017-03-27 PROCEDURE — 71020 XR CHEST PA AND LATERAL: CPT | Mod: 26,,, | Performed by: INTERNAL MEDICINE

## 2017-04-11 DIAGNOSIS — J90 PLEURAL EFFUSION: Primary | ICD-10-CM

## 2017-04-13 ENCOUNTER — TELEPHONE (OUTPATIENT)
Dept: INTERVENTIONAL RADIOLOGY/VASCULAR | Facility: HOSPITAL | Age: 67
End: 2017-04-13

## 2017-04-13 ENCOUNTER — OFFICE VISIT (OUTPATIENT)
Dept: SURGERY | Facility: CLINIC | Age: 67
End: 2017-04-13
Payer: MEDICARE

## 2017-04-13 VITALS — DIASTOLIC BLOOD PRESSURE: 69 MMHG | SYSTOLIC BLOOD PRESSURE: 119 MMHG | TEMPERATURE: 97 F | HEART RATE: 110 BPM

## 2017-04-13 DIAGNOSIS — K40.20 BILATERAL INGUINAL HERNIA WITHOUT OBSTRUCTION OR GANGRENE, RECURRENCE NOT SPECIFIED: ICD-10-CM

## 2017-04-13 DIAGNOSIS — J90 PLEURAL EFFUSION: ICD-10-CM

## 2017-04-13 DIAGNOSIS — K43.2 INCISIONAL HERNIA WITHOUT OBSTRUCTION OR GANGRENE: ICD-10-CM

## 2017-04-13 DIAGNOSIS — Z99.2 ESRD ON PERITONEAL DIALYSIS: ICD-10-CM

## 2017-04-13 DIAGNOSIS — N18.6 ESRD ON PERITONEAL DIALYSIS: ICD-10-CM

## 2017-04-13 DIAGNOSIS — K42.9 UMBILICAL HERNIA WITHOUT OBSTRUCTION AND WITHOUT GANGRENE: Primary | ICD-10-CM

## 2017-04-13 PROCEDURE — 99214 OFFICE O/P EST MOD 30 MIN: CPT | Mod: S$PBB,,, | Performed by: SURGERY

## 2017-04-13 PROCEDURE — 99999 PR PBB SHADOW E&M-EST. PATIENT-LVL III: CPT | Mod: PBBFAC,,, | Performed by: SURGERY

## 2017-04-13 PROCEDURE — 99213 OFFICE O/P EST LOW 20 MIN: CPT | Mod: PBBFAC,PN | Performed by: SURGERY

## 2017-04-13 NOTE — MR AVS SNAPSHOT
[facility]  [address]  [address]  [place] [address]  Phone: [phone]                  Terry Cote   2017 9:00 AM   Office Visit    Description:  Male : 1950   Provider:  Mirna Romero DO   Department:  [facility]           Reason for Visit     Follow-up                To Do List           Future Appointments        Provider Department Dept Phone    2017 9:00 AM [facility] IR1 [facility] [phone]      Your Future Surgeries/Procedures     2017   [facility]   [facility] ([facility])    [address]  [place] [address] [phone]              Goals (5 Years of Data)     None      [facility]     [facility] Nurse Care Line -  Assistance  Unless otherwise directed by your provider, please contact [facility], our nurse care line that is available for  assistance.     Registered nurses in the [facility] provide: appointment scheduling, clinical advisement, health education, and other advisory services.  Call: [phone] (toll free)               Medications           Message regarding Medications     Verify the changes and/or additions to your medication regime listed below are the same as discussed with your clinician today.  If any of these changes or additions are incorrect, please notify your healthcare provider.             Verify that the below list of medications is an accurate representation of the medications you are currently taking.  If none reported, the list may be blank. If incorrect, please contact your healthcare provider. Carry this list with you in case of emergency.           Current Medications     allopurinol (ZYLOPRIM) 100 MG tablet Take 1 tablet (100 mg total) by mouth once daily.    aspirin (ECOTRIN) 81 MG EC tablet Take 1 tablet (81 mg total) by mouth once daily.     clopidogrel (PLAVIX) 75 mg tablet Take 1 tablet (75 mg total) by mouth once daily.    docusate sodium (COLACE) 100 MG capsule Take 1 capsule (100 mg total) by mouth 2 (two) times daily as needed for Constipation.    EX-LAX, SENNOSIDES, ORAL Take by mouth as needed. As directed    gabapentin (NEURONTIN) 300 MG capsule Take 1 capsule (300 mg total) by mouth every evening.    insulin aspart protamine-insulin aspart (NOVOLOG 70/30) 100 unit/mL (70-30) InPn pen Inject 5 Units into the skin 3 (three) times daily with meals.    insulin glargine (LANTUS SOLOSTAR) 100 unit/mL (3 mL) InPn pen Inject 20 Units into the skin every evening.    lisinopril 10 MG tablet Take 1 tablet (10 mg total) by mouth once daily.    NYSTOP powder APPLY TO AFFECTED AREA Q 6 H TO SITE    ondansetron (ZOFRAN-ODT) 4 MG TbDL     potassium chloride SA (K-DUR,KLOR-CON) 20 MEQ tablet TK 1 T PO ONCE  D    pravastatin (PRAVACHOL) 80 MG tablet Take 40 mg by mouth once daily.    senna-docusate 8.6-50 mg (PERICOLACE) 8.6-50 mg per tablet Take 1 tablet by mouth 2 (two) times daily.    vitamin renal formula, B-complex-vitamin c-folic acid, (NEPHROCAP) 1 mg Cap Take 1 capsule by mouth once daily.           Clinical Reference Information           Your Vitals Were     BP Pulse Temp             119/69 110 97.1 °F (36.2 °C)         Blood Pressure          Most Recent Value    BP  119/69      Allergies as of 4/13/2017     No Known Allergies      Immunizations Administered on Date of Encounter - 4/13/2017     None      Maintenance Dialysis History     Start End Type Comments Center    [facility]/22/[facility]            Current Dialysis Center Information     [facility] [address] Phone #:  [phone]    Contact:  [place]  [address] Fax #:  [phone]            Language Assistance Services     ATTENTION: Language assistance services are available, free of charge. Please call [phone].      ATENCIÓN:  Si habla español, tiene a moody disposición servicios gratuitos de asistencia lingüística. Llame al [phone].     CHÚ Ý: N?u b?n nói Ti?ng Vi?t, có các d?ch v? h? tr? ngôn ng? mi?n phí dành cho b?n. G?i s? [phone].         [facility] complies with applicable Federal civil rights laws and does not discriminate on the basis of race, color, national origin, age, disability, or sex.

## 2017-04-13 NOTE — PROGRESS NOTES
Subjective:       Patient ID: Terry Cote is a 67 y.o. male.    Chief Complaint: Follow-up    HPI   Patient presents with his family for follow-up.  He has no complaints.  Tolerating regular diet.  Tolerating peritoneal dialysis nightly without issue.  No nausea or vomiting.  No fevers or chills.  Normal bowel movements.  He has been diagnosed with a pleural effusion and is scheduled for thoracentesis on April 17, 2017.    Review of Systems    All systems were reviewed and are negative, except that mentioned in the HPI.    Objective:      Physical Exam   Constitutional: He is oriented to person, place, and time. He appears well-developed and well-nourished. No distress.   HENT:   Head: Normocephalic and atraumatic.   Eyes: Conjunctivae and EOM are normal. Pupils are equal, round, and reactive to light. No scleral icterus.   Neck: Normal range of motion. Neck supple. No JVD present.   Cardiovascular: Normal rate and regular rhythm.    Pulmonary/Chest: Effort normal and breath sounds normal. No respiratory distress.   Abdominal: Soft. Bowel sounds are normal. He exhibits no distension. There is no tenderness. There is no rebound and no guarding. No hernia.   Well-healed hernia incision scars noted.  Peritoneal dialysis catheter intact.   Musculoskeletal: Normal range of motion. He exhibits no edema or tenderness.   Neurological: He is alert and oriented to person, place, and time. No cranial nerve deficit.   Skin: Skin is warm and dry. He is not diaphoretic.   Psychiatric: He has a normal mood and affect.       Assessment:       1. Umbilical hernia without obstruction and without gangrene    2. Pleural effusion    3. Bilateral inguinal hernia without obstruction or gangrene, recurrence not specified    4. Incisional hernia without obstruction or gangrene    5. ESRD on peritoneal dialysis        Plan:   The patient appears to be doing well.Sign of hernia recurrence or problems with peritoneal dialysis.  He will  proceed with thoracentesis as previously planned on April 17.  All of his questions were answered, and he'll follow with Dr. Zuniga as needed.

## 2017-04-19 ENCOUNTER — HOSPITAL ENCOUNTER (INPATIENT)
Facility: HOSPITAL | Age: 67
LOS: 2 days | Discharge: HOME | DRG: 981 | End: 2017-04-21
Attending: EMERGENCY MEDICINE | Admitting: INTERNAL MEDICINE
Payer: MEDICARE

## 2017-04-19 DIAGNOSIS — Z99.2 TYPE 2 DIABETES MELLITUS WITH CHRONIC KIDNEY DISEASE ON CHRONIC DIALYSIS, WITHOUT LONG-TERM CURRENT USE OF INSULIN: ICD-10-CM

## 2017-04-19 DIAGNOSIS — R07.9 RIGHT-SIDED CHEST PAIN: ICD-10-CM

## 2017-04-19 DIAGNOSIS — E46 MALNUTRITION: Chronic | ICD-10-CM

## 2017-04-19 DIAGNOSIS — N18.6 ESRD (END STAGE RENAL DISEASE) ON DIALYSIS: ICD-10-CM

## 2017-04-19 DIAGNOSIS — Z79.4 TYPE 2 DIABETES MELLITUS WITH CHRONIC KIDNEY DISEASE ON CHRONIC DIALYSIS, WITH LONG-TERM CURRENT USE OF INSULIN: ICD-10-CM

## 2017-04-19 DIAGNOSIS — Z01.818 PRE-TRANSPLANT EVALUATION FOR CHRONIC KIDNEY DISEASE: ICD-10-CM

## 2017-04-19 DIAGNOSIS — J90 PLEURAL EFFUSION: ICD-10-CM

## 2017-04-19 DIAGNOSIS — N18.6 TYPE 2 DIABETES MELLITUS WITH CHRONIC KIDNEY DISEASE ON CHRONIC DIALYSIS, WITH LONG-TERM CURRENT USE OF INSULIN: ICD-10-CM

## 2017-04-19 DIAGNOSIS — G62.9 NEUROPATHY: ICD-10-CM

## 2017-04-19 DIAGNOSIS — Z99.2 TYPE 2 DIABETES MELLITUS WITH CHRONIC KIDNEY DISEASE ON CHRONIC DIALYSIS, WITH LONG-TERM CURRENT USE OF INSULIN: ICD-10-CM

## 2017-04-19 DIAGNOSIS — N25.81 SECONDARY HYPERPARATHYROIDISM: ICD-10-CM

## 2017-04-19 DIAGNOSIS — E55.9 VITAMIN D DEFICIENCY: ICD-10-CM

## 2017-04-19 DIAGNOSIS — G45.9 UNSPECIFIED TRANSIENT CEREBRAL ISCHEMIA: ICD-10-CM

## 2017-04-19 DIAGNOSIS — N18.6 ESRD ON DIALYSIS: Primary | ICD-10-CM

## 2017-04-19 DIAGNOSIS — L02.211 CUTANEOUS ABSCESS OF ABDOMINAL WALL: ICD-10-CM

## 2017-04-19 DIAGNOSIS — J90 PLEURAL EFFUSION, RIGHT: ICD-10-CM

## 2017-04-19 DIAGNOSIS — Z86.73 HISTORY OF STROKE: ICD-10-CM

## 2017-04-19 DIAGNOSIS — I25.10 CORONARY ARTERY DISEASE INVOLVING NATIVE CORONARY ARTERY OF NATIVE HEART WITHOUT ANGINA PECTORIS: ICD-10-CM

## 2017-04-19 DIAGNOSIS — N39.0 URINARY TRACT INFECTION WITHOUT HEMATURIA, SITE UNSPECIFIED: ICD-10-CM

## 2017-04-19 DIAGNOSIS — Z99.2 ESRD (END STAGE RENAL DISEASE) ON DIALYSIS: ICD-10-CM

## 2017-04-19 DIAGNOSIS — E78.5 DYSLIPIDEMIA ASSOCIATED WITH TYPE 2 DIABETES MELLITUS: ICD-10-CM

## 2017-04-19 DIAGNOSIS — N18.6 TYPE 2 DIABETES MELLITUS WITH CHRONIC KIDNEY DISEASE ON CHRONIC DIALYSIS, WITHOUT LONG-TERM CURRENT USE OF INSULIN: ICD-10-CM

## 2017-04-19 DIAGNOSIS — D63.8 ANEMIA OF CHRONIC DISEASE: ICD-10-CM

## 2017-04-19 DIAGNOSIS — E11.59 HYPERTENSION ASSOCIATED WITH DIABETES: ICD-10-CM

## 2017-04-19 DIAGNOSIS — E13.40: ICD-10-CM

## 2017-04-19 DIAGNOSIS — R40.4 TRANSIENT ALTERATION OF AWARENESS: ICD-10-CM

## 2017-04-19 DIAGNOSIS — I63.30 CEREBRAL INFARCTION DUE TO THROMBOSIS OF CEREBRAL ARTERY: ICD-10-CM

## 2017-04-19 DIAGNOSIS — Z76.82 ORGAN TRANSPLANT CANDIDATE: ICD-10-CM

## 2017-04-19 DIAGNOSIS — T85.71XD: ICD-10-CM

## 2017-04-19 DIAGNOSIS — I63.9 CEREBROVASCULAR ACCIDENT (CVA), UNSPECIFIED MECHANISM: ICD-10-CM

## 2017-04-19 DIAGNOSIS — R97.20 ELEVATED PSA: ICD-10-CM

## 2017-04-19 DIAGNOSIS — I63.59 CEREBRAL INFARCTION DUE TO OCCLUSION OF OTHER CEREBRAL ARTERY: ICD-10-CM

## 2017-04-19 DIAGNOSIS — T85.71XA: ICD-10-CM

## 2017-04-19 DIAGNOSIS — Z99.2 ESRD ON PERITONEAL DIALYSIS: ICD-10-CM

## 2017-04-19 DIAGNOSIS — E78.2 MIXED HYPERLIPIDEMIA: Chronic | ICD-10-CM

## 2017-04-19 DIAGNOSIS — R13.19 OTHER DYSPHAGIA: ICD-10-CM

## 2017-04-19 DIAGNOSIS — E79.0 HYPERURICEMIA: ICD-10-CM

## 2017-04-19 DIAGNOSIS — A41.9 SEPSIS, DUE TO UNSPECIFIED ORGANISM: ICD-10-CM

## 2017-04-19 DIAGNOSIS — E11.22 TYPE 2 DIABETES MELLITUS WITH CHRONIC KIDNEY DISEASE ON CHRONIC DIALYSIS, WITH LONG-TERM CURRENT USE OF INSULIN: ICD-10-CM

## 2017-04-19 DIAGNOSIS — I15.2 HYPERTENSION ASSOCIATED WITH DIABETES: ICD-10-CM

## 2017-04-19 DIAGNOSIS — N18.6 ESRD ON PERITONEAL DIALYSIS: ICD-10-CM

## 2017-04-19 DIAGNOSIS — R13.14 PHARYNGOESOPHAGEAL DYSPHAGIA: ICD-10-CM

## 2017-04-19 DIAGNOSIS — E11.21 DIABETIC NEPHROPATHY ASSOCIATED WITH TYPE 2 DIABETES MELLITUS: ICD-10-CM

## 2017-04-19 DIAGNOSIS — R53.1 WEAKNESS: ICD-10-CM

## 2017-04-19 DIAGNOSIS — Z99.2 ESRD ON DIALYSIS: Primary | ICD-10-CM

## 2017-04-19 DIAGNOSIS — E11.69 DYSLIPIDEMIA ASSOCIATED WITH TYPE 2 DIABETES MELLITUS: ICD-10-CM

## 2017-04-19 DIAGNOSIS — I50.30 CHF WITH LEFT VENTRICULAR DIASTOLIC DYSFUNCTION, NYHA CLASS 1: ICD-10-CM

## 2017-04-19 DIAGNOSIS — R55 SYNCOPE, UNSPECIFIED SYNCOPE TYPE: ICD-10-CM

## 2017-04-19 DIAGNOSIS — E11.9 TYPE 2 DIABETES MELLITUS WITHOUT COMPLICATION, WITHOUT LONG-TERM CURRENT USE OF INSULIN: ICD-10-CM

## 2017-04-19 DIAGNOSIS — I10 ESSENTIAL HYPERTENSION: ICD-10-CM

## 2017-04-19 DIAGNOSIS — M62.541 ATROPHY OF MUSCLE OF RIGHT HAND: ICD-10-CM

## 2017-04-19 DIAGNOSIS — I50.30 (HFPEF) HEART FAILURE WITH PRESERVED EJECTION FRACTION: ICD-10-CM

## 2017-04-19 DIAGNOSIS — E11.22 TYPE 2 DIABETES MELLITUS WITH CHRONIC KIDNEY DISEASE ON CHRONIC DIALYSIS, WITHOUT LONG-TERM CURRENT USE OF INSULIN: ICD-10-CM

## 2017-04-19 DIAGNOSIS — E11.29 TYPE 2 DIABETES MELLITUS WITH OTHER KIDNEY COMPLICATION, UNSPECIFIED LONG TERM INSULIN USE STATUS: ICD-10-CM

## 2017-04-19 LAB
ALBUMIN SERPL BCP-MCNC: 2.6 G/DL
ALP SERPL-CCNC: 95 U/L
ALT SERPL W/O P-5'-P-CCNC: 53 U/L
ANION GAP SERPL CALC-SCNC: 13 MMOL/L
APPEARANCE FLD: CLEAR
APTT BLDCRRT: 31.3 SEC
AST SERPL-CCNC: 32 U/L
BACTERIA #/AREA URNS HPF: ABNORMAL /HPF
BASOPHILS # BLD AUTO: 0.05 K/UL
BASOPHILS NFR BLD: 0.5 %
BILIRUB SERPL-MCNC: 0.5 MG/DL
BILIRUB UR QL STRIP: NEGATIVE
BNP SERPL-MCNC: 24 PG/ML
BODY FLD TYPE: NORMAL
BUN SERPL-MCNC: 34 MG/DL
CALCIUM SERPL-MCNC: 9.7 MG/DL
CHLORIDE SERPL-SCNC: 101 MMOL/L
CLARITY UR: ABNORMAL
CO2 SERPL-SCNC: 25 MMOL/L
COLOR FLD: COLORLESS
COLOR UR: YELLOW
CORTIS SERPL-MCNC: 10.2 UG/DL
CREAT SERPL-MCNC: 6.5 MG/DL
DIFFERENTIAL METHOD: ABNORMAL
EOSINOPHIL # BLD AUTO: 0.6 K/UL
EOSINOPHIL NFR BLD: 5.9 %
ERYTHROCYTE [DISTWIDTH] IN BLOOD BY AUTOMATED COUNT: 15.1 %
EST. GFR  (AFRICAN AMERICAN): 9 ML/MIN/1.73 M^2
EST. GFR  (NON AFRICAN AMERICAN): 8 ML/MIN/1.73 M^2
GLUCOSE SERPL-MCNC: 84 MG/DL
GLUCOSE UR QL STRIP: NEGATIVE
HCT VFR BLD AUTO: 36.8 %
HGB BLD-MCNC: 12.4 G/DL
HGB UR QL STRIP: ABNORMAL
HYALINE CASTS #/AREA URNS LPF: 0 /LPF
INR PPP: 1
KETONES UR QL STRIP: NEGATIVE
LACTATE SERPL-SCNC: 1.5 MMOL/L
LEUKOCYTE ESTERASE UR QL STRIP: ABNORMAL
LIPASE SERPL-CCNC: 63 U/L
LYMPHOCYTES # BLD AUTO: 1.5 K/UL
LYMPHOCYTES NFR BLD: 14.7 %
LYMPHOCYTES NFR FLD MANUAL: 78 %
MAGNESIUM SERPL-MCNC: 1.4 MG/DL
MCH RBC QN AUTO: 28 PG
MCHC RBC AUTO-ENTMCNC: 33.7 %
MCV RBC AUTO: 83 FL
MICROSCOPIC COMMENT: ABNORMAL
MONOCYTES # BLD AUTO: 0.7 K/UL
MONOCYTES NFR BLD: 6.8 %
MONOS+MACROS NFR FLD MANUAL: 2 %
NEUTROPHILS # BLD AUTO: 7.5 K/UL
NEUTROPHILS NFR BLD: 71.8 %
NEUTROPHILS NFR FLD MANUAL: 20 %
NITRITE UR QL STRIP: NEGATIVE
PH UR STRIP: 6 [PH] (ref 5–8)
PHOSPHATE SERPL-MCNC: 3 MG/DL
PLATELET # BLD AUTO: 200 K/UL
PMV BLD AUTO: 9.7 FL
POCT GLUCOSE: 139 MG/DL (ref 70–110)
POTASSIUM SERPL-SCNC: 3.3 MMOL/L
PREALB SERPL-MCNC: 24 MG/DL
PROCALCITONIN SERPL IA-MCNC: 12.46 NG/ML
PROT SERPL-MCNC: 8.5 G/DL
PROT UR QL STRIP: ABNORMAL
PROTHROMBIN TIME: 10.7 SEC
RBC # BLD AUTO: 4.43 M/UL
RBC #/AREA URNS HPF: 10 /HPF (ref 0–4)
SODIUM SERPL-SCNC: 139 MMOL/L
SP GR UR STRIP: 1.02 (ref 1–1.03)
TSH SERPL DL<=0.005 MIU/L-ACNC: 0.48 UIU/ML
URN SPEC COLLECT METH UR: ABNORMAL
UROBILINOGEN UR STRIP-ACNC: NEGATIVE EU/DL
WBC # BLD AUTO: 10.36 K/UL
WBC # FLD: 22 /CU MM
WBC #/AREA URNS HPF: 20 /HPF (ref 0–5)

## 2017-04-19 PROCEDURE — 80053 COMPREHEN METABOLIC PANEL: CPT

## 2017-04-19 PROCEDURE — 85610 PROTHROMBIN TIME: CPT

## 2017-04-19 PROCEDURE — 83605 ASSAY OF LACTIC ACID: CPT

## 2017-04-19 PROCEDURE — 96366 THER/PROPH/DIAG IV INF ADDON: CPT

## 2017-04-19 PROCEDURE — 11000001 HC ACUTE MED/SURG PRIVATE ROOM

## 2017-04-19 PROCEDURE — 84134 ASSAY OF PREALBUMIN: CPT

## 2017-04-19 PROCEDURE — 87118 MYCOBACTERIC IDENTIFICATION: CPT | Mod: 59

## 2017-04-19 PROCEDURE — 87040 BLOOD CULTURE FOR BACTERIA: CPT | Mod: 59

## 2017-04-19 PROCEDURE — 96365 THER/PROPH/DIAG IV INF INIT: CPT

## 2017-04-19 PROCEDURE — 93010 ELECTROCARDIOGRAM REPORT: CPT | Mod: ,,, | Performed by: INTERNAL MEDICINE

## 2017-04-19 PROCEDURE — 83735 ASSAY OF MAGNESIUM: CPT

## 2017-04-19 PROCEDURE — 93005 ELECTROCARDIOGRAM TRACING: CPT

## 2017-04-19 PROCEDURE — 63600175 PHARM REV CODE 636 W HCPCS: Performed by: HOSPITALIST

## 2017-04-19 PROCEDURE — 84100 ASSAY OF PHOSPHORUS: CPT

## 2017-04-19 PROCEDURE — 63600175 PHARM REV CODE 636 W HCPCS: Performed by: INTERNAL MEDICINE

## 2017-04-19 PROCEDURE — 90945 DIALYSIS ONE EVALUATION: CPT

## 2017-04-19 PROCEDURE — 83690 ASSAY OF LIPASE: CPT

## 2017-04-19 PROCEDURE — 87086 URINE CULTURE/COLONY COUNT: CPT

## 2017-04-19 PROCEDURE — 87070 CULTURE OTHR SPECIMN AEROBIC: CPT | Mod: 59

## 2017-04-19 PROCEDURE — 84145 PROCALCITONIN (PCT): CPT

## 2017-04-19 PROCEDURE — 85025 COMPLETE CBC W/AUTO DIFF WBC: CPT

## 2017-04-19 PROCEDURE — 87075 CULTR BACTERIA EXCEPT BLOOD: CPT

## 2017-04-19 PROCEDURE — 84443 ASSAY THYROID STIM HORMONE: CPT

## 2017-04-19 PROCEDURE — 85730 THROMBOPLASTIN TIME PARTIAL: CPT

## 2017-04-19 PROCEDURE — 82533 TOTAL CORTISOL: CPT

## 2017-04-19 PROCEDURE — 25000003 PHARM REV CODE 250: Performed by: INTERNAL MEDICINE

## 2017-04-19 PROCEDURE — 25000003 PHARM REV CODE 250: Performed by: HOSPITALIST

## 2017-04-19 PROCEDURE — 99285 EMERGENCY DEPT VISIT HI MDM: CPT | Mod: 25

## 2017-04-19 PROCEDURE — 87205 SMEAR GRAM STAIN: CPT

## 2017-04-19 PROCEDURE — 81000 URINALYSIS NONAUTO W/SCOPE: CPT

## 2017-04-19 PROCEDURE — 87070 CULTURE OTHR SPECIMN AEROBIC: CPT

## 2017-04-19 PROCEDURE — 89051 BODY FLUID CELL COUNT: CPT

## 2017-04-19 PROCEDURE — 83880 ASSAY OF NATRIURETIC PEPTIDE: CPT

## 2017-04-19 RX ORDER — CLOPIDOGREL BISULFATE 75 MG/1
75 TABLET ORAL DAILY
Status: DISCONTINUED | OUTPATIENT
Start: 2017-04-20 | End: 2017-04-21 | Stop reason: HOSPADM

## 2017-04-19 RX ORDER — ACETAMINOPHEN 325 MG/1
325 TABLET ORAL EVERY 6 HOURS PRN
Status: DISCONTINUED | OUTPATIENT
Start: 2017-04-19 | End: 2017-04-21 | Stop reason: HOSPADM

## 2017-04-19 RX ORDER — ALLOPURINOL 100 MG/1
100 TABLET ORAL DAILY
Status: DISCONTINUED | OUTPATIENT
Start: 2017-04-20 | End: 2017-04-21 | Stop reason: HOSPADM

## 2017-04-19 RX ORDER — GABAPENTIN 300 MG/1
300 CAPSULE ORAL NIGHTLY
Status: DISCONTINUED | OUTPATIENT
Start: 2017-04-19 | End: 2017-04-21 | Stop reason: HOSPADM

## 2017-04-19 RX ORDER — AMOXICILLIN 250 MG
1 CAPSULE ORAL 2 TIMES DAILY
Status: DISCONTINUED | OUTPATIENT
Start: 2017-04-19 | End: 2017-04-21 | Stop reason: HOSPADM

## 2017-04-19 RX ORDER — GLUCAGON 1 MG
1 KIT INJECTION
Status: DISCONTINUED | OUTPATIENT
Start: 2017-04-19 | End: 2017-04-21 | Stop reason: HOSPADM

## 2017-04-19 RX ORDER — ONDANSETRON 2 MG/ML
4 INJECTION INTRAMUSCULAR; INTRAVENOUS EVERY 12 HOURS PRN
Status: DISCONTINUED | OUTPATIENT
Start: 2017-04-19 | End: 2017-04-21 | Stop reason: HOSPADM

## 2017-04-19 RX ORDER — IBUPROFEN 200 MG
16 TABLET ORAL
Status: DISCONTINUED | OUTPATIENT
Start: 2017-04-19 | End: 2017-04-21 | Stop reason: HOSPADM

## 2017-04-19 RX ORDER — MAGNESIUM SULFATE HEPTAHYDRATE 40 MG/ML
2 INJECTION, SOLUTION INTRAVENOUS ONCE
Status: COMPLETED | OUTPATIENT
Start: 2017-04-19 | End: 2017-04-19

## 2017-04-19 RX ORDER — PRAVASTATIN SODIUM 40 MG/1
40 TABLET ORAL DAILY
Status: DISCONTINUED | OUTPATIENT
Start: 2017-04-20 | End: 2017-04-21 | Stop reason: HOSPADM

## 2017-04-19 RX ORDER — LISINOPRIL 5 MG/1
10 TABLET ORAL DAILY
Status: DISCONTINUED | OUTPATIENT
Start: 2017-04-20 | End: 2017-04-21 | Stop reason: HOSPADM

## 2017-04-19 RX ORDER — IBUPROFEN 200 MG
24 TABLET ORAL
Status: DISCONTINUED | OUTPATIENT
Start: 2017-04-19 | End: 2017-04-21 | Stop reason: HOSPADM

## 2017-04-19 RX ORDER — DOCUSATE SODIUM 100 MG/1
100 CAPSULE, LIQUID FILLED ORAL 2 TIMES DAILY PRN
Status: DISCONTINUED | OUTPATIENT
Start: 2017-04-19 | End: 2017-04-21 | Stop reason: HOSPADM

## 2017-04-19 RX ORDER — POLYETHYLENE GLYCOL 3350 17 G/17G
17 POWDER, FOR SOLUTION ORAL DAILY
Status: DISCONTINUED | OUTPATIENT
Start: 2017-04-20 | End: 2017-04-21 | Stop reason: HOSPADM

## 2017-04-19 RX ORDER — INSULIN ASPART 100 [IU]/ML
1-10 INJECTION, SOLUTION INTRAVENOUS; SUBCUTANEOUS
Status: DISCONTINUED | OUTPATIENT
Start: 2017-04-19 | End: 2017-04-21 | Stop reason: HOSPADM

## 2017-04-19 RX ORDER — ASPIRIN 81 MG/1
81 TABLET ORAL DAILY
Status: DISCONTINUED | OUTPATIENT
Start: 2017-04-20 | End: 2017-04-21 | Stop reason: HOSPADM

## 2017-04-19 RX ADMIN — STANDARDIZED SENNA CONCENTRATE AND DOCUSATE SODIUM 1 TABLET: 8.6; 5 TABLET, FILM COATED ORAL at 09:04

## 2017-04-19 RX ADMIN — MAGNESIUM SULFATE IN WATER 2 G: 40 INJECTION, SOLUTION INTRAVENOUS at 04:04

## 2017-04-19 RX ADMIN — GABAPENTIN 300 MG: 300 CAPSULE ORAL at 09:04

## 2017-04-19 RX ADMIN — INSULIN DETEMIR 20 UNITS: 100 INJECTION, SOLUTION SUBCUTANEOUS at 09:04

## 2017-04-19 RX ADMIN — SODIUM CHLORIDE, SODIUM LACTATE, CALCIUM CHLORIDE, MAGNESIUM CHLORIDE AND DEXTROSE: 2.5; 538; 448; 18.3; 5.08 INJECTION, SOLUTION INTRAPERITONEAL at 06:04

## 2017-04-19 NOTE — IP AVS SNAPSHOT
[facility]  [address]  [place] [address]  Phone: [phone]           Patient Discharge Instructions   Our goal is to set you up for success. This packet includes information on your condition, medications, and your home care.  It will help you care for yourself to prevent having to return to the hospital.     Please ask your nurse if you have any questions.      There are many details to remember when preparing to leave the hospital. Here is what you will need to do:    1. Take your medicine. If you are prescribed medications, review your Medication List on the following pages. You may have new medications to  at the pharmacy and others that you'll need to stop taking. Review the instructions for how and when to take your medications. Talk with your doctor or nurses if you are unsure of what to do.     2. Go to your follow-up appointments. Specific follow-up information is listed in the following pages. Your may be contacted by a nurse or clinical provider about future appointments. Be sure we have all of the phone numbers to reach you. Please contact your provider's office if you are unable to make an appointment.     3. Watch for warning signs. Your doctor or nurse will give you detailed warning signs to watch for and when to call for assistance. These instructions may also include educational information about your condition. If you experience any of warning signs to your health, call your doctor.               ** Verify the list of medication(s) below is accurate and up to date. Carry this with you in case of emergency. If your medications have changed, please notify your healthcare provider.             Medication List      START taking these medications        Additional Info                      dextrose 5 % SolP 50 mL with ceftAZIDime 1 gram SolR 1 g   Quantity:  1 g   Refills:  PRN   Dose:  1 g   Indications:  Intra-abdominal    Last time this was given:  1,000 mg on  4/20/2017  8:48 PM   Instructions:  Inject 1 g into the vein every 12 (twelve) hours. 1 g of Ceftaz after HD.     Begin Date    AM    Noon    PM    Bedtime       VANCOMYCIN 1 G/250 ML D5W (READY TO MIX SYSTEM)   Quantity:  250 mL   Refills:  PRN   Dose:  1000 mg   Indications:  Intra-abdominal    Last time this was given:  1,000 mg on 4/20/2017  2:56 PM   Instructions:  Inject 250 mLs (1,000 mg total) into the vein daily as needed. 1 g vancomycin after each dialysis session.     Begin Date    AM    Noon    PM    Bedtime         CHANGE how you take these medications        Additional Info                      gabapentin 300 MG capsule   Commonly known as:  NEURONTIN   Quantity:  30 capsule   Refills:  3   Dose:  300 mg   What changed:  when to take this    Last time this was given:  300 mg on 4/20/2017  8:48 PM   Instructions:  Take 1 capsule (300 mg total) by mouth every evening.     Begin Date    AM    Noon    PM    Bedtime       insulin aspart protamine-insulin aspart 100 unit/mL (70-30) Inpn pen   Commonly known as:  NovoLOG 70/30   Quantity:  3 mL   Refills:  11   Dose:  5 Units   What changed:  how much to take    Instructions:  Inject 5 Units into the skin 3 (three) times daily with meals.     Begin Date    AM    Noon    PM    Bedtime         CONTINUE taking these medications        Additional Info                      allopurinol 100 MG tablet   Commonly known as:  ZYLOPRIM   Quantity:  30 tablet   Refills:  1   Dose:  100 mg    Last time this was given:  100 mg on 4/20/2017  8:49 AM   Instructions:  Take 1 tablet (100 mg total) by mouth once daily.     Begin Date    AM    Noon    PM    Bedtime       aspirin 81 MG EC tablet   Commonly known as:  ECOTRIN   Quantity:  30 tablet   Refills:  3   Dose:  81 mg    Last time this was given:  81 mg on 4/20/2017  8:49 AM   Instructions:  Take 1 tablet (81 mg total) by mouth once daily.     Begin Date    AM    Noon    PM    Bedtime       clopidogrel 75 mg tablet    Commonly known as:  PLAVIX   Quantity:  90 tablet   Refills:  3   Dose:  75 mg    Last time this was given:  75 mg on 4/20/2017  8:49 AM   Instructions:  Take 1 tablet (75 mg total) by mouth once daily.     Begin Date    AM    Noon    PM    Bedtime       docusate sodium 100 MG capsule   Commonly known as:  COLACE   Quantity:  100 capsule   Refills:  0   Dose:  100 mg    Instructions:  Take 1 capsule (100 mg total) by mouth 2 (two) times daily as needed for Constipation.     Begin Date    AM    Noon    PM    Bedtime       EX-LAX (SENNOSIDES) ORAL   Refills:  0    Instructions:  Take by mouth as needed. As directed     Begin Date    AM    Noon    PM    Bedtime       insulin glargine 100 unit/mL (3 mL) Inpn pen   Commonly known as:  LANTUS SOLOSTAR   Quantity:  31 Syringe   Refills:  6   Dose:  20 Units   Comments:  Please dispense with 31 Lantus pen needles with 6 refills.     Instructions:  Inject 20 Units into the skin every evening.     Begin Date    AM    Noon    PM    Bedtime       lisinopril 10 MG tablet   Quantity:  90 tablet   Refills:  3   Dose:  10 mg    Last time this was given:  10 mg on 4/20/2017  8:49 AM   Instructions:  Take 1 tablet (10 mg total) by mouth once daily.     Begin Date    AM    Noon    PM    Bedtime       NYSTOP powder   Refills:  0   Generic drug:  nystatin    Instructions:  APPLY TO AFFECTED AREA Q 6 H TO SITE     Begin Date    AM    Noon    PM    Bedtime       ondansetron 4 MG Tbdl   Commonly known as:  ZOFRAN-ODT   Refills:  1      Begin Date    AM    Noon    PM    Bedtime       potassium chloride SA 20 MEQ tablet   Commonly known as:  K-DUR,KLOR-CON   Refills:  2    Last time this was given:  20 mEq on 4/20/2017 11:04 AM   Instructions:  TK 1 T PO ONCE  D     Begin Date    AM    Noon    PM    Bedtime       pravastatin 80 MG tablet   Commonly known as:  PRAVACHOL   Refills:  0   Dose:  40 mg    Last time this was given:  40 mg on 4/20/2017  8:49 AM   Instructions:  Take 40 mg by  mouth once daily.     Begin Date    AM    Noon    PM    Bedtime       senna-docusate 8.6-50 mg 8.6-50 mg per tablet   Commonly known as:  PERICOLACE   Quantity:  30 tablet   Refills:  0   Dose:  1 tablet    Last time this was given:  1 tablet on 4/20/2017  8:48 PM   Instructions:  Take 1 tablet by mouth 2 (two) times daily.     Begin Date    AM    Noon    PM    Bedtime       vitamin renal formula (B-complex-vitamin c-folic acid) 1 mg Cap   Commonly known as:  NEPHROCAP   Quantity:  90 capsule   Refills:  1   Dose:  1 capsule    Last time this was given:  1 capsule on 4/20/2017  8:49 AM   Instructions:  Take 1 capsule by mouth once daily.     Begin Date    AM    Noon    PM    Bedtime            Where to Get Your Medications      You can get these medications from any pharmacy     Bring a paper prescription for each of these medications     dextrose 5 % SolP 50 mL with ceftAZIDime 1 gram SolR 1 g    VANCOMYCIN 1 G/250 ML D5W (READY TO MIX SYSTEM)                  Please bring to all follow up appointments:    1. A copy of your discharge instructions.  2. All medicines you are currently taking in their original bottles.  3. Identification and insurance card.    Please arrive 15 minutes ahead of scheduled appointment time.    Please call 24 hours in advance if you must reschedule your appointment and/or time.        Your Scheduled Appointments     May 04, 2017 10:00 AM [facility] Follow Up with MD [place] [facility] (Devorahsliv Henson)    [address]  [facility] [address] [phone]              Follow-up Information     Follow up with [facility] On 4/24/2017.    Specialty:  Dialysis Center    Why:  @3:30pm    Contact information:    [address] [facility] [id number] [phone]          Follow up with [place] [facility]. Call on 5/4/2017.    Specialty:  [facility]    Why:  @9:30am for lab work then in clinic with   Ranjit for 10am    Contact information:    [address]  [place] [address] [phone]        Follow up with [facility].    Specialty:  Home Health Services    Why:  Home Health    Contact information:    [address]  [address]  [place] [id number] [phone]        Referrals     Future Orders    Ambulatory referral to Home Health         Discharge Instructions     Future Orders    Activity as tolerated     Diet Cardiac     Diet renal     SUBSEQUENT HOME HEALTH ORDERS     Comments:    Subsequent Home Health Orders    Current Medications:  Current Facility-Administered Medications:  acetaminophen tablet 325 mg, 325 mg, Oral, Q6H PRN, Archie Orozco MD  allopurinol tablet 100 mg, 100 mg, Oral, Daily, Archie Orozco MD, Stopped at 04/21/17 0900  aspirin EC tablet 81 mg, 81 mg, Oral, Daily, Archie Orozco MD, Stopped at 04/21/17 0900  ceftAZIDime (FORTAZ) 1 g in dextrose 5 % 50 mL IVPB, 1 g, Intravenous, Q12H, Gabriela Ayers MD, Stopped at 04/21/17 0800  clopidogrel tablet 75 mg, 75 mg, Oral, Daily, Archie Orozco MD, Stopped at 04/21/17 0900  dextrose 50% injection 12.5 g, 12.5 g, Intravenous, PRN, Archie Orozco MD  dextrose 50% injection 25 g, 25 g, Intravenous, PRN, Archie Orozco MD  diphenhydrAMINE injection 25 mg, 25 mg, Intravenous, Q6H PRN, Derrick Ojeda MD  docusate sodium capsule 100 mg, 100 mg, Oral, BID PRN, Archie Orozco MD  fluconazole (DIFLUCAN) IVPB 100 mg 50 mL, 100 mg, Intravenous, Q24H, Eileen Hand MD, 100 mg at 04/20/17 2258  gabapentin capsule 300 mg, 300 mg, Oral, QHS, Archie Orozco MD, 300 mg at 04/20/17 2048  glucagon (human recombinant) injection 1 mg, 1 mg, Intramuscular, PRN, Archie Orozco MD  glucose chewable tablet 16 g, 16 g, Oral, PRN, Archie Orozco MD  glucose chewable tablet 24 g, 24 g, Oral, PRN, Archie Booth  MD Ernesto  insulin aspart pen 1-10 Units, 1-10 Units, Subcutaneous, QID (AC + HS) PRN, Archie Orozco MD, 2 Units at 04/20/17 1644  insulin detemir pen 20 Units, 20 Units, Subcutaneous, QHS, Archie Orozco MD, 20 Units at 04/20/17 2256  lisinopril tablet 10 mg, 10 mg, Oral, Daily, Archie Orozco MD, Stopped at 04/21/17 0900  ondansetron injection 4 mg, 4 mg, Intravenous, Q12H PRN, Archie Orozco MD  ondansetron injection 4 mg, 4 mg, Intravenous, Daily PRN, Derrick Ojeda MD  oxycodone immediate release tablet 5 mg, 5 mg, Oral, Q3H PRN, Derrick Ojeda MD  polyethylene glycol packet 17 g, 17 g, Oral, Daily, Archie Orozco MD, Stopped at 04/21/17 0900  pravastatin tablet 40 mg, 40 mg, Oral, Daily, Archie Orozco MD, Stopped at 04/21/17 0900  senna-docusate 8.6-50 mg per tablet 1 tablet, 1 tablet, Oral, BID, Archie Orozco MD, Stopped at 04/21/17 0900  sodium chloride 0.9% flush 3 mL, 3 mL, Intravenous, PRN, Derrick Ojeda MD  sodium chloride 0.9% flush 3 mL, 3 mL, Intravenous, PRN, Derrick Ojeda MD  [START ON 4/22/2017] vancomycin 1 g in dextrose 5 % 250 mL IVPB (ready to mix system), 1,000 mg, Intravenous, Q24H, Sg Bradley MD  vitamin renal formula (B-complex-vitamin c-folic acid) 1 mg per capsule 1 capsule, 1 capsule, Oral, Daily, Archie Orozco MD, Stopped at 04/21/17 0900        Nursing:   N/A    Diet:   renal diet    Activities:   activity as tolerated    Labs:  N/A; will obtain with HD    Referrals/ Consults  Physical Therapy to evaluate and treat. Evaluate for home safety and equipment needs; Establish/upgrade home exercise program. Perform / instruct on therapeutic exercises, gait training, transfer training, and Range of Motion.  Occupational Therapy to evaluate and treat. Evaluate home environment for safety and equipment needs. Perform/Instruct on transfers, ADL training, ROM, and therapeutic exercises.    Home  "Health Aide:  Physical Therapy Three times weekly and Occupational Therapy Three times weekly    Questions:    What Home Health Agency is the patient currently using?:  [facility]      Discharge References/Attachments     HEMODIALYSIS (ENGLISH)    ABDOMEN: INCISION CARE (ENGLISH)        Primary Diagnosis     Your primary diagnosis was:  Peritoneal Dialysis Catheter Exit Site Infection      Admission Information     Date & Time Provider Department CSN    4/19/2017 10:38 AM Sg Bradley MD [facility] [id number]      Care Providers     Provider Role Specialty Primary office phone    Sg Bradley MD Attending Provider Internal Medicine [phone]    Gabriela Flynn MD Consulting Physician  General Surgery [phone]    Sg Bradley MD Team Attending  Internal Medicine  [phone]    Susan Slaughter MD Consulting Physician  Infectious Diseases [phone]    Cristina Howard MD Consulting Physician  Anesthesiology [phone]    Gabriela Flynn MD Surgeon  General Surgery [phone]      Your Vitals Were     BP Pulse Temp Resp    124/67 (BP Location: Right arm, Patient Position: Lying, BP Method: Automatic) 102 97.7 °F (36.5 °C) (Oral) 16    Height Weight SpO2 BMI    5' 11" (1.803 m) 82.4 kg (181 lb 10.5 oz) 96% 25.34 kg/m2      Recent Lab Values        4/22/2015 7/23/2015 11/6/2015 1/4/2016 4/4/2016 6/7/2016 11/4/2016 2/8/2017      7:48 AM  6:05 AM  4:05 PM  9:16 AM  4:09 PM 12:18 PM  3:02 AM 11:41 AM    A1C 7.3 (H) 7.3 (H) 6.1 6.0 6.1 6.4 (H) 6.0 5.5    Comment for A1C at  3:02 AM on 11/4/2016:  According to ADA guidelines, hemoglobin A1C <7.0% represents  optimal control in non-pregnant diabetic patients.  Different  metrics may apply to specific populations.   Standards of Medical Care in Diabetes - 2016.  For the purpose of screening for the presence of diabetes:  <5.7%     Consistent with the absence of diabetes  5.7-6.4%  Consistent with increasing risk for " diabetes   (prediabetes)  >or=6.5%  Consistent with diabetes  Currently no consensus exists for use of hemoglobin A1C  for diagnosis of diabetes for children.      Comment for A1C at 11:41 AM on 2/8/2017:  According to ADA guidelines, hemoglobin A1C <7.0% represents  optimal control in non-pregnant diabetic patients.  Different  metrics may apply to specific populations.   Standards of Medical Care in Diabetes - 2016.  For the purpose of screening for the presence of diabetes:  <5.7%     Consistent with the absence of diabetes  5.7-6.4%  Consistent with increasing risk for diabetes   (prediabetes)  >or=6.5%  Consistent with diabetes  Currently no consensus exists for use of hemoglobin A1C  for diagnosis of diabetes for children.        Pending Labs     Order Current Status    Aerobic culture In process    Culture, Anaerobe In process    Specimen to Pathology - Surgery In process    Aerobic culture Preliminary result    Aerobic culture Preliminary result    Aerobic culture Preliminary result    Blood culture x two cultures. Draw prior to antibiotics. Preliminary result    Blood culture x two cultures. Draw prior to antibiotics. Preliminary result    Culture, Anaerobe Preliminary result      Allergies as of 4/21/2017     No Known Allergies      [facility]     [facility] Care Line - 24/7 Assistance  Unless otherwise directed by your provider, please contact [facility], our nurse care line that is available for 24/7 assistance.     Registered nurses in the [facility] provide clinical advisement, health education, appointment booking, and other advisory services.  Call for this free service at [phone].        Advance Directives     An advance directive is a document which, in the event you are no longer able to make decisions for yourself, tells your healthcare team what kind of treatment you do or do not want to receive, or who you would like to make those decisions for you.  If  you do not currently have an advance directive, [facility] encourages you to create one.  For more information call:  [phone] ([phone]),  or log on to [email or web address].        Language Assistance Services     ATTENTION: Language assistance services are available, free of charge. Please call [phone].      ATENCIÓN: Si habla español, tiene a moody disposición servicios gratuitos de asistencia lingüística. Llame al [phone].     CHÚ Ý: N?u b?n nói Ti?ng Vi?t, có các d?ch v? h? tr? ngôn ng? mi?n phí dành cho b?n. G?i s? [phone].        Stroke Education              Heart Failure Education       Heart Failure: Being Active  You have a condition called heart failure. Being active doesnt mean that you have to wear yourself out. Even a little movement each day helps to strengthen your heart. If you cant get out to exercise, you can do simple stretching and strengthening exercises at home. These are good ways to keep you well-conditioned and prevent you and your heart from becoming excessively weak.    Ideas to get you started  · Add a little movement to things you do now. Walk to mail letters. Park your car at the far end of the parking lot and walk to the store. Walk up a flight of stairs instead of taking the elevator.  · Choose activities you enjoy. You might walk, swim, or ride an exercise bike. Things like gardening and washing the car count, too. Other possibilities include: washing dishes, walking the dog, walking around the mall, and doing aerobic activities with friends.  · Join a group exercise program at a [facility], a senior center, or a community center. Or look into a hospital cardiac rehabilitation program. Ask your doctor if you qualify.  Tips to keep you going  · Get up and get dressed each day. Go to a coffee shop and read a newspaper or go somewhere that you'll be in the presence of other active people. Youll feel more like being  active.  · Make a plan. Choose one or more activities that you enjoy and that you can easily do. Then plan to do at least one each day. You might write your plan on a calendar.  · Go with a friend or a group if you like company. This can help you feel supported and stay motivated, too.  · Plan social events that you enjoy. This will keep you mentally engaged as well as physically motivated to do things you find pleasure in.  For your safety  · Talk with your healthcare provider before starting an exercise program.  · Exercise indoors when its too hot or too cold outside, or when the air quality is poor. Try walking at a shopping mall.  · Wear socks and sturdy shoes to maintain your balance and prevent falls.  · Start slowly. Do a few minutes several times a day at first. Increase your time and speed little by little.  · Stop and rest whenever you feel tired or get short of breath.  · Dont push yourself on days when you dont feel well.  Date Last Reviewed: 3/20/2016  © 9571-8010 [employer]. [address] [place] [address]. All rights reserved. This information is not intended as a substitute for professional medical care. Always follow your healthcare professional's instructions.              Heart Failure: Evaluating Your Heart  You have a condition called heart failure. To evaluate your condition, your doctor will examine you, ask questions, and do some tests. Along with looking for signs of heart failure, the doctor looks for any other health problems that may have led to heart failure. The results of your evaluation will help your doctor form a treatment plan.  Health history and physical exam  Your visit will start with a health history. Tell the doctor about any symptoms youve noticed and about all medicines you take. Then youll have a physical exam. This includes listening to your heartbeat and breathing. Youll also be checked for swelling (edema) in your legs and neck. When you  have fluid buildup or fluid in the lungs, it may be called congestive heart failure.  Diagnosing heart failure     During an echocardiogram, sound waves bounce off the heart. These are converted into a picture on the screen.   The following may be done to help your doctor form a diagnosis:  · X-rays show the size and shape of your heart. These pictures can also show fluid in your lungs.  · An electrocardiogram (ECG or EKG) shows the pattern of your heartbeat. Small pads (electrodes) are placed on your chest, arms, and legs. Wires connect the pads to the ECG machine, which records your hearts electrical signals. This can give the doctor information about heart function.  · An echocardiogram uses ultrasound waves to show the structure and movement of your heart muscle. This shows how well the heart pumps. It also shows the thickness of the heart walls, and if the heart is enlarged. It is one of the most useful, non-invasive tests as it provides information about the heart's general function. This helps your doctor make treatment decisions.  · Lab tests evaluate small amounts of blood or urine for signs of problems. A BNP lab test can help diagnose and evaluate heart failure. BNP stands for B-type natriuretic peptide. The ventricles secrete more BNP when heart failure worsens. Lab tests can also provide information about metabolic dysfunction or heart dysfunction.  Your treatment plan  Based on the results of your evaluation and tests, your doctor will develop a treatment plan. This plan is designed to relieve some of your heart failure symptoms and help make you more comfortable. Your treatment plan may include:  · Medicine to help your heart work better and improve your quality of life  · Changes in what you eat and drink to help prevent fluid from backing up in your body  · Daily monitoring of your weight and heart failure symptoms to see how well your treatment plan is working  · Exercise to help you stay  healthy  · Help with quitting smoking  · Emotional and psychological support to help adjust to the changes  · Referrals to other specialists to make sure you are being treated comprehensively  Date Last Reviewed: 3/21/2016  © 1111-7715 [employer]. [address] [place] [address], [place] [address]. All rights reserved. This information is not intended as a substitute for professional medical care. Always follow your healthcare professional's instructions.              Heart Failure: Making Changes to Your Diet  You have a condition called heart failure. When you have heart failure, excess fluid is more likely to build up in your body because your heart isn't working well. This makes the heart work harder to pump blood. Fluid buildup causes symptoms such as shortness of breath and swelling (edema). This is often referred to as congestive heart failure or CHF. Controlling the amount of salt (sodium) you eat may help stop fluid from building up. Your doctor may also tell you to reduce the amount of fluid you drink.  Reading food labels    Your healthcare provider will tell you how much sodium you can eat each day. Read food labels to keep track. Keep in mind that certain foods are high in salt. These include canned, frozen, and processed foods. Check the amount of sodium in each serving. Watch out for high-sodium ingredients. These include MSG (monosodium glutamate), baking soda, and sodium phosphate.   Eating less salt  Give yourself time to get used to eating less salt. It may take a little while. Here are some tips to help:  · Take the saltshaker off the table. Replace it with salt-free herb mixes and spices.  · Eat fresh or plain frozen vegetables. These have much less salt than canned vegetables.  · Choose low-sodium snacks like sodium-free pretzels, crackers, or air-popped popcorn.  · Dont add salt to your food when youre cooking. Instead, season your foods with pepper, lemon, garlic, or onion.  · When  you eat out, ask that your food be cooked without added salt.  · Avoid eating fried foods as these often have a great deal of salt.  If youre told to limit fluids  You may need to limit how much fluid you have to help prevent swelling. This includes anything that is liquid at room temperature, such as ice cream and soup. If your doctor tells you to limit fluid, try these tips:  · Measure drinks in a measuring cup before you drink them. This will help you meet daily goals.  · Chill drinks to make them more refreshing.  · Suck on frozen lemon wedges to quench thirst.  · Only drink when youre thirsty.  · Chew sugarless gum or suck on hard candy to keep your mouth moist.  · Weigh yourself daily to know if your body's fluid content is rising.  My sodium goal  Your healthcare provider may give you a sodium goal to meet each day. This includes sodium found in food as well as salt that you add. My goal is to eat no more than ___________ mg of sodium per day.     When to call your doctor  Call your doctor right away if you have any symptoms of worsening heart failure. These can include:  · Sudden weight gain  · Increased swelling of your legs or ankles  · Trouble breathing when youre resting or at night  · Increase in the number of pillows you have to sleep on  · Chest pain, pressure, discomfort, or pain in the jaw, neck, or back   Date Last Reviewed: 3/21/2016  © 7910-0873 [employer]. [address] [place] [address], [place] [address]. All rights reserved. This information is not intended as a substitute for professional medical care. Always follow your healthcare professional's instructions.              Heart Failure: Medicines to Help Your Heart    You have a condition called heart failure (also known as congestive heart failure, or CHF). Your doctor will likely prescribe medicines for heart failure and any underlying health problems you have. Most heart failure patients take one or more types of medicinen. Your  healthcare provider will work to find the combination of medicines that works best for you.  Heart failure medicines  Here are the most common heart failure medicines:  · ACE inhibitors lower blood pressure and decrease strain on the heart. This makes it easier for the heart to pump. Angiotensin receptor blockers have similar effects. These are prescribed for some patients instead of ACE inhibitors.  · Beta-blockers relieve stress on the heart. They also improve symptoms. They may also improve the heart's pumping action over time.  · Diuretics (also called water pills) help rid your body of excess water. This can help rid your body of swelling (edema). Having less fluid to pump means your heart doesnt have to work as hard. Some diuretics make your body lose a mineral called potassium. Your doctor will tell you if you need to take supplements or eat more foods high in potassium.  · Digoxin helps your heart pump with more strength. This helps your heart pump more blood with each beat. So, more oxygen-rich blood travels to the rest of the body.  · Aldosterone antagonists help alter hormones and decrease strain on the heart.  · Hydralazine and nitrates are two separate medicines used together to treat heart failure. They may come in one combination pill. They lower blood pressure and decrease how hard the heart has to pump.  Medicines for related conditions  Controlling other heart problems helps keep heart failure under control, too. Depending on other heart problems you have, medicines may be prescribed to:  · Lower blood pressure (antihypertensives).  · Lower cholesterol levels (statins).  · Prevent blood clots (anticoagulants or aspirin).  · Keep the heartbeat steady (antiarrhythmics).  Date Last Reviewed: 3/5/2016  © 1941-3804 [employer]. [address] [place] [address], [place] [address]. All rights reserved. This information is not intended as a substitute for professional medical care. Always follow  your healthcare professional's instructions.              Heart Failure: Procedures That May Help    The heart is a muscle that pumps oxygen-rich blood to all parts of the body. When you have heart failure, the heart is not able to pump as well as it should. Blood and fluid may back up into the lungs (congestive heart failure), and some parts of the body dont get enough oxygen-rich blood to work normally. These problems lead to the symptoms of heart failure.     Certain procedures may help the heart pump better in some cases of heart failure. Some procedures are done to treat health problems that may have caused the heart failure such as coronary artery disease or heart rhythm problems. For more serious heart failure, other options are available.  Treating artery and valve problems  If you have coronary artery disease or valve disease, procedures may be done to improve blood flow. This helps the heart pump better, which can improve heart failure symptoms. First, your doctor may do a cardiac catheterization to help detect clogged blood vessels or valve damage. During this procedure, a  thin tube (catheter) in inserted into a blood vessel and guided to the heart. There a dye is injected and a special type of X-ray (angiogram) is taken of the blood vessels. Procedures to open a blocked artery or fix damaged valves can also be done using catheterization.  · Angioplasty uses a balloon-tipped instrument at the end of the catheter. The balloon is inflated to widen the narrowed artery. In many cases, a stent is expanded to further support the narrowed artery. A stent is a metal mesh tube.  · Valve surgery repairs or replacement of faulty valves can also be done during catheterization so blood can flow properly through the chambers of the heart.  Bypass surgery is another option to help treat blocked arteries. It uses a healthy blood vessel from elsewhere in the body. The healthy blood vessel is attached above and below the  blocked area so that blood can flow around the blocked artery.  Treating heart rhythm problems  A device may be placed in the chest to help a weak heart maintain a healthy, heartbeat so the heart can pump more effectively:  · Pacemaker. A pacemaker is an implanted device that regulates your heartbeat electronically. It monitors your heart's rhythm and generates a painless electric impulse that helps the heart beat in a regular rhythm. A pacemaker is programmed to meet your specific heart rhythm needs.  · Biventricular pacing/cardiac resynchronization therapy. A type of pacemaker that paces both pumping chambers of the heart at the same time to coordinate contractions and to improve the heart's function. Some people with heart failure are candidates for this therapy.  · Implantable cardioverter defibrillator. A device similar to a pacemaker that senses when the heart is beating too fast and delivers an electrical shock to convert the fast rhythm to a normal rhythm. This can be a life saving device.  In severe cases  In more serious cases of heart failure when other treatments no longer work, other options may include:  · Ventricular assist devices (VADs). These are mechanical devices used to take over the pumping function for one or both of the heart's ventricles, or pumping chambers. A VAD may be necessary when heart failure progresses to the point that medicines and other treatments no longer help. In some cases, a VAD may be used as a bridge to transplant.  · Heart transplant. This is replacing the diseased heart with a healthy one from a donor. This is an option for a few people who are very sick. A heart transplant is very serious and not an option for all patients. Your doctor can tell you more.  Date Last Reviewed: 3/20/2016  © 0499-4035 [employer]. [address] [place] [address], [place] [address]. All rights reserved. This information is not intended as a substitute for professional medical care.  Always follow your healthcare professional's instructions.              Heart Failure: Tracking Your Weight  You have a condition called heart failure. When you have heart failure, a sudden weight gain or a steady rise in weight is a warning sign that your body is retaining too much water and salt. This could mean your heart failure is getting worse. If left untreated, it can cause problems for your lungs and result in shortness of breath. Weighing yourself each day is the best way to know if youre retaining water. If your weight goes up quickly, call your doctor. You will be given instructions on how to get rid of the excess water. You will likely need medicines and to avoid salt. This will help your heart work better.  Call your doctor if you gain more than 2 pounds in 1 day, more than 5 pounds in 1 week, or whatever weight gain you were told to report by your doctor. This is often a sign of worsening heart failure and needs to be evaluated and treated. Your doctor will tell you what to do next.   Tips for weighing yourself    · Weigh yourself at the same time each morning, wearing the same clothes. Weigh yourself after urinating and before eating.  · Use the same scale each day. Make sure the numbers are easy to read. Put the scale on a flat, hard surface -- not on a rug or carpet.  · Do not stop weighing yourself. If you forget one day, weigh again the next morning.  How to use your weight chart  · Keep your weight chart near the scale. Write your weight on the chart as soon as you get off the scale.  · Fill in the month and the start date on the chart. Then write down your weight each day. Your chart will look like this:    · If you miss a day, leave the space blank. Weigh yourself the next day and write your weight in the next space.  · Take your weight chart with you when you go to see your doctor.  Date Last Reviewed: 3/20/2016  © 3352-2163 [employer]. [address] [place] [address], [place]  [address]. All rights reserved. This information is not intended as a substitute for professional medical care. Always follow your healthcare professional's instructions.              Heart Failure: Warning Signs of a Flare-Up  You have a condition called heart failure. Once you have heart failure, flare-ups can happen. Below are signs that can mean your heart failure is getting worse. If you notice any of these warning signs, call your healthcare provider.  Swelling    · Your feet, ankles, or lower legs get puffier.  · You notice skin changes on your lower legs.  · Your shoes feel too tight.  · Your clothes are tighter in the waist.  · You have trouble getting rings on or off your fingers.  Shortness of breath  · You have to breathe harder even when youre doing your normal activities or when youre resting.  · You are short of breath walking up stairs or even short distances.  · You wake up at night short of breath or coughing.  · You need to use more pillows or sit up to sleep.  · You wake up tired or restless.  Other warning signs  · You feel weaker, dizzy, or more tired.  · You have chest pain or changes in your heartbeat.  · You have a cough that wont go away.  · You cant remember things or dont feel like eating.  Tracking your weight  Gaining weight is often the first warning sign that heart failure is getting worse. Gaining even a few pounds can be a sign that your body is retaining excess water and salt. Weighing yourself each day in the morning after you urinate and before you eat, is the best way to know if you're retaining water. Get a scale that is easy to read and make sure you wear the same clothes and use the same scale every time you weigh. Your healthcare provider will show you how to track your weight. Call your doctor if you gain more than 2 pounds in 1 day, 5 pounds in 1 week, or whatever weight gain you were told to report by your doctor. This is often a sign of worsening heart failure and needs  to be evaluated and treated before it compromises your breathing. Your doctor will tell you what to do next.    Date Last Reviewed: 3/15/2016  © 1252-3094 [employer]. [address] [place] [address], [place] [address]. All rights reserved. This information is not intended as a substitute for professional medical care. Always follow your healthcare professional's instructions.              Pneumonmia Discharge Instructions                Chronic Kindey Disease Education             Diabetes Discharge Instructions                                    [facility] complies with applicable Federal civil rights laws and does not discriminate on the basis of race, color, national origin, age, disability, or sex.

## 2017-04-19 NOTE — H&P
[facility] History and Physical - Resident Note    Admitting Team: Medicine B  Attending Physician: Mirna  Resident: Jonh  Interns: Armen    Date of Admit: 4/19/2017    Chief Complaint     Hypotension (accompanied by possible wound infection to peritoneal dialyisis site; sent by Dr. Elizalde; denies pain or fever; does dialysis every night)   for 4 days.    Subjective:      History of Present Illness:  Terry Cote is a 67 y.o. male who  has a past medical history of Anemia in chronic kidney disease; Arthritis; Coronary artery disease; Diabetes mellitus; Diabetes mellitus, type 2; Diabetic nephropathy (4/22/2015); Diastolic dysfunction; Hypertension; Myocardial infarction (2001); Peritoneal dialysis catheter in place (2014); Secondary hyperparathyroidism; Stroke (2001, 2013, 10/2014); Type 2 diabetes mellitus for many yrs about 24 yrs (4/22/2015); and Unspecified disorder of kidney and ureter.. The patient presented to [facility] on 4/19/2017 with a primary complaint of Hypotension (accompanied by possible wound infection to peritoneal dialyisis site; sent by Dr. Elizalde; denies pain or fever; does dialysis every night)      The patient was in their usual state of health until 4 days prior to admission. The patient notes that 4 days prior to admission he began to notices some discharge from the PD catether site. He describes the discharge as yellow and lessening discharge over the last day. He states that he went to [facility] for a check up and was told his blood pressure was low and that he needed to come to the ED.  He states that when he washed the area he felt a warm sensation. Denies chest pain, fever, shortness of breath, diarrhea, nausea/vomiting.     Past Medical History:  Past Medical History:   Diagnosis Date    Anemia in chronic kidney disease     Arthritis     unable to walk due to arthritis in knees.     Coronary artery disease     Diabetes mellitus      Diabetes mellitus, type 2     Diabetic nephropathy 4/22/2015    Diastolic dysfunction     Hypertension     Myocardial infarction 2001    Peritoneal dialysis catheter in place 2014    Secondary hyperparathyroidism     Stroke 2001, 2013, 10/2014    weak, slurred speech, equal hand grasp/equal leg movements    Type 2 diabetes mellitus for many yrs about 24 yrs 4/22/2015    Unspecified disorder of kidney and ureter        Past Surgical History:  Past Surgical History:   Procedure Laterality Date    AV FISTULA PLACEMENT Left 2011    wrist;    CATARACT EXTRACTION      NECK SURGERY      PERITONEAL CATHETER INSERTION      PORTACATH PLACEMENT  march 2016    left chest    SPINE SURGERY         Allergies:  Review of patient's allergies indicates:  No Known Allergies    Home Medications:  Prior to Admission medications    Medication Sig Start Date End Date Taking? Authorizing Provider   allopurinol (ZYLOPRIM) 100 MG tablet Take 1 tablet (100 mg total) by mouth once daily. 1/16/15   Kathy Casey MD   aspirin (ECOTRIN) 81 MG EC tablet Take 1 tablet (81 mg total) by mouth once daily. 12/18/14   Maria Esther Mayo MD   clopidogrel (PLAVIX) 75 mg tablet Take 1 tablet (75 mg total) by mouth once daily. 2/21/17   Duncan Rincon MD   docusate sodium (COLACE) 100 MG capsule Take 1 capsule (100 mg total) by mouth 2 (two) times daily as needed for Constipation. 11/14/16   Kimber Dias NP   EX-LAX, SENNOSIDES, ORAL Take by mouth as needed. As directed    Historical Provider, MD   gabapentin (NEURONTIN) 300 MG capsule Take 1 capsule (300 mg total) by mouth every evening.  Patient taking differently: Take 300 mg by mouth once daily.  1/16/15   Kathy Casey MD   insulin aspart protamine-insulin aspart (NOVOLOG 70/30) 100 unit/mL (70-30) InPn pen Inject 5 Units into the skin 3 (three) times daily with meals.  Patient taking differently: Inject 8 Units into the skin 3 (three) times daily with meals.   "16   Samuel Jay MD   insulin glargine (LANTUS SOLOSTAR) 100 unit/mL (3 mL) InPn pen Inject 20 Units into the skin every evening. 16  Samuel Jay MD   lisinopril 10 MG tablet Take 1 tablet (10 mg total) by mouth once daily. 17  Brenda Ernandez MD   NYSTOP powder APPLY TO AFFECTED AREA Q 6 H TO SITE 16   Historical Provider, MD   ondansetron (ZOFRAN-ODT) 4 MG TbDL  16   Historical Provider, MD   potassium chloride SA (K-DUR,KLOR-CON) 20 MEQ tablet TK 1 T PO ONCE  D 16   Historical Provider, MD   pravastatin (PRAVACHOL) 80 MG tablet Take 40 mg by mouth once daily.    Historical Provider, MD   senna-docusate 8.6-50 mg (PERICOLACE) 8.6-50 mg per tablet Take 1 tablet by mouth 2 (two) times daily. 16   Sahara Douglas MD   vitamin renal formula, B-complex-vitamin c-folic acid, (NEPHROCAP) 1 mg Cap Take 1 capsule by mouth once daily. 7/23/15   Mariaa Alvarez, DO       Family History:  Family History   Problem Relation Age of Onset    Cancer Mother     Cancer Father     No Known Problems Sister     No Known Problems Brother     No Known Problems Sister     No Known Problems Sister     No Known Problems Brother        Social History:  Social History   Substance Use Topics    Smoking status: Never Smoker    Smokeless tobacco: Never Used    Alcohol use No       Review of Systems:  All other systems are reviewed and are negative.    Health Maintaince :   Primary Care Physician: Mckenzie  Immunizations:   TDap is up to date, .  Influenza is up to date, .  Pneumovax is up to date, .  Cancer Screening:  Colonoscopy: is up to date.      Objective:   Last 24 Hour Vital Signs:  BP  Min: 97/51  Max: 105/52  Temp  Av.1 °F (36.7 °C)  Min: 98.1 °F (36.7 °C)  Max: 98.1 °F (36.7 °C)  Pulse  Av.3  Min: 99  Max: 118  Resp  Av.5  Min: 18  Max: 20  SpO2  Av %  Min: 98 %  Max: 100 %  Height  Av' 11" (180.3 cm)  Min: 5' 11" (180.3 cm)  Max: 5' 11" " (180.3 cm)  Weight  Av.7 kg (189 lb)  Min: 85.7 kg (189 lb)  Max: 85.7 kg (189 lb)  Body mass index is 26.36 kg/(m^2).       Physical Examination:  General Appearance:    Alert, cooperative, NAD, appears stated age   Head:    Normocephalic, without obvious abnormality, atraumatic   Eyes:    PERRL, conjunctiva/corneas clear, EOM's intact grossly   Nose:   Nares normal, septum midline, mucosa normal, no drainage       Throat:   Lips, mucosa, and tongue normal; teeth and gums normal   Neck:   Supple, symmetrical, trachea midline, no adenopathy;       Back:     Symmetric, no curvature   Lungs:     Clear to auscultation bilaterally, respirations unlabored   Chest Wall:    No tenderness or deformity    Heart:    Regular rate and rhythm, S1 and S2 normal, no murmur, rub      Abdomen:     Soft, non-tender, +BS, PD catheter in place with small amount of yellow discharge on the gauze, no erythema or warmth    Extremities:   Extremities normal, atraumatic, no cyanosis or edema   Pulses:   2+ and symmetric all extremities   Skin:   Skin color, texture; no rashes or lesions on exposed skin   Neurologic:   A&O, moving all four extremities, slowed speech        Laboratory:  Most Recent Data:  CBC: Lab Results   Component Value Date    WBC 10.36 2017    HGB 12.4 (L) 2017    HCT 36.8 (L) 2017     2017    MCV 83 2017    RDW 15.1 (H) 2017     BMP: Lab Results   Component Value Date     2017    K 3.3 (L) 2017     2017    CO2 25 2017    BUN 34 (H) 2017    CREATININE 6.5 (H) 2017    GLU 84 2017    CALCIUM 9.7 2017    MG 1.4 (L) 2017    PHOS 3.0 2017     LFTs: Lab Results   Component Value Date    PROT 8.5 (H) 2017    ALBUMIN 2.6 (L) 2017    BILITOT 0.5 2017    AST 32 2017    ALKPHOS 95 2017    ALT 53 (H) 2017     Coags:   Lab Results   Component Value Date    INR 1.0 2017      FLP: Lab Results   Component Value Date    CHOL 82 (L) 06/06/2016    HDL 37 (L) 06/06/2016    LDLCALC 34.6 (L) 06/06/2016    TRIG 52 06/06/2016    CHOLHDL 45.1 06/06/2016     DM: Lab Results   Component Value Date    HGBA1C 5.5 02/08/2017    HGBA1C 6.0 11/04/2016    HGBA1C 6.4 (H) 06/07/2016    LDLCALC 34.6 (L) 06/06/2016    CREATININE 6.5 (H) 04/19/2017     Thyroid: Lab Results   Component Value Date    TSH 0.484 04/19/2017    FREET4 1.03 12/18/2014     Anemia: Lab Results   Component Value Date    IRON 31 (L) 11/04/2016    TIBC 71 (L) 11/04/2016    FERRITIN 1200 (H) 11/04/2016    ESMJSSMD15 1718 (H) 11/04/2016    FOLATE 18.2 11/04/2016     Cardiac: Lab Results   Component Value Date    TROPONINI 0.015 02/12/2017    BNP 24 04/19/2017     Urinalysis: Lab Results   Component Value Date    LABURIN KLEBSIELLA PNEUMONIAE  >100,000 cfu/ml   11/04/2016    COLORU Yellow 11/04/2016    SPECGRAV <1.005 (A) 11/04/2016    NITRITE Negative 11/04/2016    KETONESU Trace (A) 11/04/2016    UROBILINOGEN Negative 11/04/2016    WBCUA >100 (H) 11/04/2016       Trended Lab Data:    Recent Labs  Lab 04/17/17  0830 04/19/17  1120   WBC 8.59 10.36   HGB 11.0* 12.4*   HCT 33.5* 36.8*    200   MCV 82 83   RDW 15.1* 15.1*   NA  --  139   K  --  3.3*   CL  --  101   CO2  --  25   BUN  --  34*   CREATININE  --  6.5*   GLU  --  84   PROT  --  8.5*   ALBUMIN  --  2.6*   BILITOT  --  0.5   AST  --  32   ALKPHOS  --  95   ALT  --  53*       Trended Cardiac Data:    Recent Labs  Lab 04/19/17  1120   BNP 24       Microbiology Data:  Blood culture 4/19: penging   Peritoneal fluid culture pending    Radiology:  Imaging Results         US Soft Tissue Misc (Final result) Result time:  04/19/17 13:59:04    Procedure changed from US Abdomen Limited        Final result by Augustine Phelps MD (04/19/17 13:59:04)    Impression:     Left lower quadrant subcutaneous dialysis catheter entrance site area adjacent 0.7 CM fluid collection,  nonspecific.      Electronically signed by: MARK DENISE MD  Date:     04/19/17  Time:    13:59     Narrative:    The ultrasound of anterior abdominal wall peritoneal dialysis catheter entrance site.  Complex fluid collection adjacent to catheter, superficial, subcutaneous, 0.7 CM.            X-Ray Chest AP Portable (Final result) Result time:  04/19/17 12:11:31    Final result by Yue Bingham MD (04/19/17 12:11:31)    Impression:    No significant change in right pleural fluid a difference in patient positioning.  No pneumothorax.      Electronically signed by: YUE BINGHAM MD  Date:     04/19/17  Time:    12:11     Narrative:    EXAM:  AP CHEST RADIOGRAPH.     CLINICAL INDICATION:  sepsis.    TECHNIQUE: Single AP view of the chest was obtained.     COMPARISON: Prior from 4/17/17    FINDINGS: The mediastinal structures are midline.  The cardiac silhouette is not enlarged.  The left lung appears clear.  There is persistent right pleural fluid with adjacent atelectasis versus consolidation, similar to previous exam allowing for differences in patient positioning.  No pneumothorax is seen.  No osseous abnormalities are identified.                   Assessment:     Terry Cote is a 67 y.o. male with:  Patient Active Problem List    Diagnosis Date Noted    Pleural effusion, right 02/12/2017    Hyperlipidemia 02/12/2017    History of stroke 02/12/2017    Right-sided chest pain 02/12/2017    Pleural effusion 09/28/2016    Dysphagia 07/15/2016    Syncope 06/07/2016    Anemia of chronic disease 03/15/2016    Essential hypertension 01/03/2016    Coronary artery disease involving native coronary artery of native heart without angina pectoris 01/03/2016    Pharyngoesophageal dysphagia 12/08/2015    Cerebral infarct     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 08/25/2015    Hand muscle atrophy 08/25/2015    Vitamin D deficiency 08/22/2015    Type 2 diabetes mellitus with  diabetic chronic kidney disease 08/21/2015    (HFpEF) heart failure with preserved ejection fraction 08/19/2015    Elevated PSA 08/19/2015    Transient alteration of awareness 08/19/2015    Neuropathy 07/23/2015    DM type 2 causing renal disease 07/23/2015    ESRD on peritoneal dialysis 07/23/2015    Stroke     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 05/14/2015    Organ transplant candidate 04/22/2015    Pre-transplant evaluation for chronic kidney disease 04/22/2015    Type 2 diabetes mellitus for many yrs about 25 yrs 04/22/2015    Diabetic nephropathy 04/22/2015    Secondary hyperparathyroidism     Anemia in chronic kidney disease     ESRD on dialysis 03/20/2015    Diabetes mellitus, type 2 01/23/2015    Dyslipidemia associated with type 2 diabetes mellitus 01/23/2015    Hypertension associated with diabetes 01/23/2015    Hyperuricemia 01/23/2015    ESRD (end stage renal disease) on dialysis 01/16/2015     Class: Acute    CHF with left ventricular diastolic dysfunction, NYHA class 1 12/19/2014    Unspecified transient cerebral ischemia 12/18/2014        Plan:     Sepsis secondary to Peritoneal catheter infection  - patient sent from [facility] for evaluation  - nephrology on board, appreciate recs  - ceftazidine via PD  - blood and peritoneal cultures pending   - patient was noted to by hypotensive, will admit to ICU     ESRD  - Cr at baseline per records  - Continued PD nightly with abx  - Nephrology consulted, appreciate recs      DM2  - A1c 5.5 2/8/17  - patient taking insulin glargine 20u nightly and aspart 5u with meals  - patients home sugars run in high 100s, will monitor       HTN  - patient hypotensive on admission, will hold antihypertensives at this time      HFpEF  - discharged on metoprolol but not on home med list   - echo with normal EF and LV diastolic dysfunction  - will monitor      CAD  - continue ASA, plavix, statin  - no acute issues      H/o Stroke  -  continued ASA, plavix, statin       Constipation  - last BM 2 days prior to admission, did not take stool softener for two days  - will continue stool softener and monitor       HLD  - continue home statin       Anemia of Chronic Disease  - worked up 11/2016  - likely 2/2 chronic renal disease  - h/h stable   - will monitor    Diet renal  PPX scd  Dispo pending clinical improvement  Code Status:     full    Gabriela Ayers  [facility] HO-1  [employer] [facility]    [facility] Hospitalist Pager numbers:   [facility] Medicine Team A (Roxane/Aide): [phone]  [facility] Medicine Team B (Mirna/Felipe):  [phone]

## 2017-04-19 NOTE — CONSULTS
Today`s Date: 4/19/2017     Admit Date: 4/19/2017    Admitting Physician: Guy J. Lefort, MD    Patient`s Name: Terry Cote , 67 y.o. male    Reason for consultation  Infected pd cathter   Patient Active Problem List:     Unspecified transient cerebral ischemia     CHF with left ventricular diastolic dysfunction, NYHA class 1     ESRD (end stage renal disease) on dialysis     Diabetes mellitus, type 2     Dyslipidemia associated with type 2 diabetes mellitus     Hypertension associated with diabetes     Hyperuricemia     ESRD on dialysis     Organ transplant candidate     Pre-transplant evaluation for chronic kidney disease     Type 2 diabetes mellitus for many yrs about 24 yrs     Diabetic nephropathy     Secondary hyperparathyroidism     Anemia in chronic kidney disease     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     Neuropathy     DM type 2 causing renal disease     ESRD on peritoneal dialysis     Stroke     (HFpEF) heart failure with preserved ejection fraction     Elevated PSA     Transient alteration of awareness     Type 2 diabetes mellitus with diabetic chronic kidney disease     Vitamin D deficiency     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     Hand muscle atrophy     Cerebral infarct     Pharyngoesophageal dysphagia     Essential hypertension     Coronary artery disease involving native coronary artery of native heart without angina pectoris     Anemia of chronic disease     Syncope     Dysphagia     Pleural effusion     Pleural effusion, right     Hyperlipidemia     History of stroke     Right-sided chest pain     Malnutrition     Peritoneal dialysis catheter site infection      Past Medical History:  No date: Anemia in chronic kidney disease  No date: Arthritis      Comment: unable to walk due to arthritis in knees.   No date: Coronary artery disease  No date: Diabetes mellitus  No date: Diabetes mellitus, type 2  4/22/2015: Diabetic nephropathy  No date: Diastolic  dysfunction  No date: Hypertension  2001: Myocardial infarction  2014: Peritoneal dialysis catheter in place  No date: Secondary hyperparathyroidism  2001, 2013, 10/2014: Stroke      Comment: weak, slurred speech, equal hand grasp/equal                leg movements  4/22/2015: Type 2 diabetes mellitus for many yrs about 24*  No date: Unspecified disorder of kidney and ureter    Past Surgical History:  2011: AV FISTULA PLACEMENT Left      Comment: wrist;  No date: CATARACT EXTRACTION  No date: NECK SURGERY  No date: PERITONEAL CATHETER INSERTION  march 2016: PORTACATH PLACEMENT      Comment: left chest  No date: SPINE SURGERY    Prior to Admission medications :  Medication allopurinol (ZYLOPRIM) 100 MG tablet, Sig Take 1 tablet (100 mg total) by mouth once daily., Start Date 1/16/15, End Date , Taking? , Authorizing Provider Kathy Casey MD    Medication aspirin (ECOTRIN) 81 MG EC tablet, Sig Take 1 tablet (81 mg total) by mouth once daily., Start Date 12/18/14, End Date , Taking? , Authorizing Provider Maria Esther Mayo MD    Medication clopidogrel (PLAVIX) 75 mg tablet, Sig Take 1 tablet (75 mg total) by mouth once daily., Start Date 2/21/17, End Date , Taking? , Authorizing Provider Duncan Rincon MD    Medication docusate sodium (COLACE) 100 MG capsule, Sig Take 1 capsule (100 mg total) by mouth 2 (two) times daily as needed for Constipation., Start Date 11/14/16, End Date , Taking? , Authorizing Provider Kimber Dias NP    Medication EX-LAX, SENNOSIDES, ORAL, Sig Take by mouth as needed. As directed, Start Date , End Date , Taking? , Authorizing Provider Erna Hines MD    Medication gabapentin (NEURONTIN) 300 MG capsule, Sig Take 1 capsule (300 mg total) by mouth every evening.Patient taking differently: Take 300 mg by mouth once daily. , Start Date 1/16/15, End Date , Taking? , Authorizing Provider Kathy Casey MD    Medication insulin aspart protamine-insulin aspart  (NOVOLOG 70/30) 100 unit/mL (70-30) InPn pen, Sig Inject 5 Units into the skin 3 (three) times daily with meals.Patient taking differently: Inject 8 Units into the skin 3 (three) times daily with meals. , Start Date 11/7/16, End Date , Taking? , Authorizing Provider Samuel Jay MD    Medication insulin glargine (LANTUS SOLOSTAR) 100 unit/mL (3 mL) InPn pen, Sig Inject 20 Units into the skin every evening., Start Date 11/7/16, End Date 11/7/17, Taking? , Authorizing Provider Samuel Jay MD    Medication lisinopril 10 MG tablet, Sig Take 1 tablet (10 mg total) by mouth once daily., Start Date 2/14/17, End Date 2/14/18, Taking? , Authorizing Provider Brenda Ernandez MD    Medication NYSTOP powder, Sig APPLY TO AFFECTED AREA Q 6 H TO SITE, Start Date 6/17/16, End Date , Taking? , Authorizing Provider Historical Provider, MD    Medication ondansetron (ZOFRAN-ODT) 4 MG TbDL, Sig , Start Date 11/17/16, End Date , Taking? , Authorizing Provider Historical Provider, MD    Medication potassium chloride SA (K-DUR,KLOR-CON) 20 MEQ tablet, Sig TK 1 T PO ONCE  D, Start Date 7/5/16, End Date , Taking? , Authorizing Provider Historical Provider, MD    Medication pravastatin (PRAVACHOL) 80 MG tablet, Sig Take 40 mg by mouth once daily., Start Date , End Date , Taking? , Authorizing Provider Historical Provider, MD    Medication senna-docusate 8.6-50 mg (PERICOLACE) 8.6-50 mg per tablet, Sig Take 1 tablet by mouth 2 (two) times daily., Start Date 11/16/16, End Date , Taking? , Authorizing Provider Sahara Douglas MD    Medication vitamin renal formula, B-complex-vitamin c-folic acid, (NEPHROCAP) 1 mg Cap, Sig Take 1 capsule by mouth once daily., Start Date 7/23/15, End Date , Taking? , Authorizing Provider Mariaa Alvarez, DO      No current facility-administered medications on file prior to encounter.   Current Outpatient Prescriptions on File Prior to Encounter:  allopurinol (ZYLOPRIM) 100 MG tablet, Take 1 tablet (100  mg total) by mouth once daily., Disp: 30 tablet, Rfl: 1  aspirin (ECOTRIN) 81 MG EC tablet, Take 1 tablet (81 mg total) by mouth once daily., Disp: 30 tablet, Rfl: 3  clopidogrel (PLAVIX) 75 mg tablet, Take 1 tablet (75 mg total) by mouth once daily., Disp: 90 tablet, Rfl: 3  docusate sodium (COLACE) 100 MG capsule, Take 1 capsule (100 mg total) by mouth 2 (two) times daily as needed for Constipation., Disp: 100 capsule, Rfl: 0  EX-LAX, SENNOSIDES, ORAL, Take by mouth as needed. As directed, Disp: , Rfl:   gabapentin (NEURONTIN) 300 MG capsule, Take 1 capsule (300 mg total) by mouth every evening. (Patient taking differently: Take 300 mg by mouth once daily. ), Disp: 30 capsule, Rfl: 3  insulin aspart protamine-insulin aspart (NOVOLOG 70/30) 100 unit/mL (70-30) InPn pen, Inject 5 Units into the skin 3 (three) times daily with meals. (Patient taking differently: Inject 8 Units into the skin 3 (three) times daily with meals. ), Disp: 3 mL, Rfl: 11  insulin glargine (LANTUS SOLOSTAR) 100 unit/mL (3 mL) InPn pen, Inject 20 Units into the skin every evening., Disp: 31 Syringe, Rfl: 6  lisinopril 10 MG tablet, Take 1 tablet (10 mg total) by mouth once daily., Disp: 90 tablet, Rfl: 3  NYSTOP powder, APPLY TO AFFECTED AREA Q 6 H TO SITE, Disp: , Rfl: 0  ondansetron (ZOFRAN-ODT) 4 MG TbDL, , Disp: , Rfl: 1  potassium chloride SA (K-DUR,KLOR-CON) 20 MEQ tablet, TK 1 T PO ONCE  D, Disp: , Rfl: 2  pravastatin (PRAVACHOL) 80 MG tablet, Take 40 mg by mouth once daily., Disp: , Rfl:   senna-docusate 8.6-50 mg (PERICOLACE) 8.6-50 mg per tablet, Take 1 tablet by mouth 2 (two) times daily., Disp: 30 tablet, Rfl: 0  vitamin renal formula, B-complex-vitamin c-folic acid, (NEPHROCAP) 1 mg Cap, Take 1 capsule by mouth once daily., Disp: 90 capsule, Rfl: 1         Review of patient's allergies indicates:  No Known Allergies    Social History:   reports that he has never smoked. He has never used smokeless tobacco. He reports that he does  not drink alcohol or use illicit drugs.     Review of patient's family history indicates:    Cancer                         Mother                    Cancer                         Father                    No Known Problems              Sister                    No Known Problems              Brother                   No Known Problems              Sister                    No Known Problems              Sister                    No Known Problems              Brother                     PHYSICAL EXAMINATION  Temp:  [98.1 °F (36.7 °C)] 98.1 °F (36.7 °C)  Pulse:  [] 99  Resp:  [18-20] 18  SpO2:  [98 %-100 %] 99 %  BP: ()/(51-57) 98/57    General Condition:   alert x 3    Head & Neck  Anemia: None  Jaundice: None  Neck vein: Not distended  Carotid Bruits: none  Lymph nodes: none palpable  Thyroid: normal    Chest: normal    Heart: normal    Rt. Breast: not examined  Lt. Breast: not examined  Axillary lymph nodes: none    Abdomen: Soft,  None tender with no palpable mass or organ, tender cath site with pus draing from the cath site  Hernia: none    Rectal: Defered    Extremities: normal    Vascular: normal    Specific focus Examination   tender peritoneal dialysis cath site ,    Imp: Infected pd cathter site, end stage renal disease,DM,HTN    Plan: check culture form cath site , iv antibiotics , may need the removal of catheter and hemodialysis, will discuss with

## 2017-04-19 NOTE — ED NOTES
Pt here sent by Renal MD for low BP--pt states his BP is normally 130-120/60 that he does peritoneal dialysis at home-he denies problems with dialysis at home and was at [facility] today for monthly routine check up. 2 days ago he di have some drainage from insertion site of peritoneal catheter. Pt denies fever, urine change, bowel changes,cough or SOB. He states he did have thoracentesis yesterday and 550cc were removed. Pt denies peritoneal fluid or urine having any color changes,odor changes and states they are clear. He denies any changes in volume of peritoneal fluid. Pt is alert and oriented x3. Skin WDL. resp are regular and unlabored. No edema to lower ext present. Puncture site to back-with no s/s of infection.

## 2017-04-19 NOTE — ED PROVIDER NOTES
Encounter Date: 4/19/2017       History     Chief Complaint   Patient presents with    Hypotension     accompanied by possible wound infection to peritoneal dialyisis site; sent by Dr. Elizalde; denies pain or fever; does dialysis every night     Review of patient's allergies indicates:  No Known Allergies  HPI Comments: 67 year old male history of ESRD (PD nightly, produces ~10 oz urine daily), hypertension, insulin dependent diabetes, gout comes in for hypotension. Patient recently had a thoracentesis 2 days ago which he has gotten 3 times in the past 2 years ever since he started on PD. Per wife, patient has been having pus draining from his PD site for the past 3-4 days which she has cleaned for him. Patient has been feeling well in his usual state of health and came into [facility] for his monthly scheduled appointment, when he was found to have a blood pressure of 80s/50s.  His nephrologist was notified and recommended patient to come to ED to be evaluated. Patient had a history of PD site infection about 1 year ago which he had oral antibiotics which resolved his infection. Dr. Louisa Zuniga placed the PD site 2 years ago.    Past Medical History:   Diagnosis Date    Anemia in chronic kidney disease     Arthritis     unable to walk due to arthritis in knees.     Coronary artery disease     Diabetes mellitus     Diabetes mellitus, type 2     Diabetic nephropathy 4/22/2015    Diastolic dysfunction     Hypertension     Myocardial infarction 2001    Peritoneal dialysis catheter in place 2014    Secondary hyperparathyroidism     Stroke 2001, 2013, 10/2014    weak, slurred speech, equal hand grasp/equal leg movements    Type 2 diabetes mellitus for many yrs about 25 yrs 4/22/2015    Unspecified disorder of kidney and ureter      Past Surgical History:   Procedure Laterality Date    AV FISTULA PLACEMENT Left 2011    wrist;    CATARACT EXTRACTION      NECK SURGERY      PERITONEAL CATHETER INSERTION       PORTACATH PLACEMENT  march 2016    left chest    SPINE SURGERY       Family History   Problem Relation Age of Onset    Cancer Mother     Cancer Father     No Known Problems Sister     No Known Problems Brother     No Known Problems Sister     No Known Problems Sister     No Known Problems Brother      Social History   Substance Use Topics    Smoking status: Never Smoker    Smokeless tobacco: Never Used    Alcohol use No     Review of Systems   Constitutional: Negative for chills, diaphoresis, fatigue, fever and unexpected weight change.   HENT: Negative for congestion, ear pain, postnasal drip, rhinorrhea, sinus pressure, sneezing, sore throat, trouble swallowing and voice change.    Eyes: Negative for pain, discharge, itching and visual disturbance.   Respiratory: Negative for cough, chest tightness, shortness of breath, wheezing and stridor.    Cardiovascular: Negative for chest pain, palpitations and leg swelling.   Gastrointestinal: Negative for abdominal pain, blood in stool, constipation, diarrhea, nausea and vomiting.   Endocrine: Negative for cold intolerance, heat intolerance, polydipsia, polyphagia and polyuria.   Genitourinary: Negative for difficulty urinating, dysuria, flank pain, frequency, hematuria and urgency.   Musculoskeletal: Negative for arthralgias, back pain, joint swelling and myalgias.   Skin: Negative for pallor, rash and wound.   Neurological: Negative for dizziness, seizures, syncope, weakness, light-headedness and headaches.       Physical Exam   Initial Vitals   BP Pulse Resp Temp SpO2   04/19/17 1036 04/19/17 1036 04/19/17 1036 04/19/17 1036 04/19/17 1036   105/52 118 20 98.1 °F (36.7 °C) 98 %     Physical Exam    Nursing note and vitals reviewed.  Constitutional: He appears well-developed and well-nourished. He is not diaphoretic. No distress.   HENT:   Head: Normocephalic and atraumatic.   Right Ear: External ear normal.   Left Ear: External ear normal.   Nose: Nose  normal.   Mouth/Throat: Oropharynx is clear and moist. No oropharyngeal exudate.   Eyes: Conjunctivae and EOM are normal. Pupils are equal, round, and reactive to light. Right eye exhibits no discharge. Left eye exhibits no discharge. No scleral icterus.   Neck: Normal range of motion. Neck supple. No JVD present.   Cardiovascular: Normal rate, regular rhythm, normal heart sounds and intact distal pulses. Exam reveals no gallop and no friction rub.    No murmur heard.  Pulmonary/Chest: Breath sounds normal. No respiratory distress. He has no wheezes. He has no rhonchi. He has no rales. He exhibits no tenderness.   Abdominal: Soft. Bowel sounds are normal. He exhibits distension (fluid wave present). He exhibits no mass. There is no tenderness. There is no rebound and no guarding.   Musculoskeletal: Normal range of motion. He exhibits no edema.   Lymphadenopathy:     He has no cervical adenopathy.   Neurological: He is alert and oriented to person, place, and time.   Skin: Skin is warm. No rash and no abscess noted. No erythema. No pallor.   PD site clean, dry, intact   Psychiatric: He has a normal mood and affect. His behavior is normal. Judgment and thought content normal.         ED Course   Procedures  Labs Reviewed   CULTURE, BLOOD   CULTURE, BLOOD   CULTURE, URINE   APTT   B-TYPE NATRIURETIC PEPTIDE   CBC W/ AUTO DIFFERENTIAL   COMPREHENSIVE METABOLIC PANEL   LACTIC ACID, PLASMA   LIPASE   MAGNESIUM   PHOSPHORUS   PROTIME-INR   PROCALCITONIN   TSH   URINALYSIS   CORTISOL, RANDOM          X-Rays: Other Radiology Reports:  The ultrasound of anterior abdominal wall peritoneal dialysis catheter entrance site.  Complex fluid collection adjacent to catheter, superficial, subcutaneous, 0.7 CM.     Impression      Left lower quadrant subcutaneous dialysis catheter entrance site area adjacent 0.7 CM fluid collection, nonspecific.    EXAM:  AP CHEST RADIOGRAPH.     CLINICAL INDICATION:  sepsis.    TECHNIQUE: Single AP  view of the chest was obtained.     COMPARISON: Prior from 4/17/17    FINDINGS: The mediastinal structures are midline.  The cardiac silhouette is not enlarged.  The left lung appears clear.  There is persistent right pleural fluid with adjacent atelectasis versus consolidation, similar to previous exam allowing for differences in patient positioning.  No pneumothorax is seen.  No osseous abnormalities are identified.     Impression     No significant change in right pleural fluid a difference in patient positioning.  No pneumothorax.             Medical Decision Making:   ED Management:  Pt to be admitted for peritoneal abx, surgery consult for fluid collection.  Does not appear to be toxic at this juncture based on exam and lab findings.          APC / Resident Notes:   Hypotension could be related to over mediated in the setting of recent thoracocentesis. However sepsis must be ruled out in the setting of recent reports of pus from PD site and history of infection from the PD site in the past.         Attending Attestation:   Physician Attestation Statement for Resident:  As the supervising MD   Physician Attestation Statement: I have personally seen and examined this patient.   I agree with the above history. -:   As the supervising MD I agree with the above PE.    As the supervising MD I agree with the above treatment, course, plan, and disposition.  I have reviewed and agree with the residents interpretation of the following: lab data.                    ED Course     Clinical Impression:   The primary encounter diagnosis was ESRD on dialysis. A diagnosis of Peritoneal dialysis catheter site infection, initial encounter was also pertinent to this visit.    Disposition:   Disposition: Admitted  Condition: Stable       Guy J. Lefort, MD  04/19/17 0310

## 2017-04-20 LAB
ALBUMIN SERPL BCP-MCNC: 2.1 G/DL
ALP SERPL-CCNC: 74 U/L
ALT SERPL W/O P-5'-P-CCNC: 54 U/L
ANION GAP SERPL CALC-SCNC: 11 MMOL/L
AST SERPL-CCNC: 29 U/L
BASOPHILS # BLD AUTO: 0.03 K/UL
BASOPHILS NFR BLD: 0.4 %
BILIRUB SERPL-MCNC: 0.5 MG/DL
BUN SERPL-MCNC: 34 MG/DL
CALCIUM SERPL-MCNC: 8.7 MG/DL
CHLORIDE SERPL-SCNC: 102 MMOL/L
CO2 SERPL-SCNC: 24 MMOL/L
CREAT SERPL-MCNC: 6.2 MG/DL
DIFFERENTIAL METHOD: ABNORMAL
EOSINOPHIL # BLD AUTO: 0.5 K/UL
EOSINOPHIL NFR BLD: 6.9 %
ERYTHROCYTE [DISTWIDTH] IN BLOOD BY AUTOMATED COUNT: 15.2 %
EST. GFR  (AFRICAN AMERICAN): 10 ML/MIN/1.73 M^2
EST. GFR  (NON AFRICAN AMERICAN): 8 ML/MIN/1.73 M^2
GLUCOSE SERPL-MCNC: 141 MG/DL
GRAM STN SPEC: NORMAL
GRAM STN SPEC: NORMAL
HCT VFR BLD AUTO: 33.1 %
HGB BLD-MCNC: 10.9 G/DL
LYMPHOCYTES # BLD AUTO: 1.4 K/UL
LYMPHOCYTES NFR BLD: 19.4 %
MAGNESIUM SERPL-MCNC: 1.7 MG/DL
MCH RBC QN AUTO: 27 PG
MCHC RBC AUTO-ENTMCNC: 32.9 %
MCV RBC AUTO: 82 FL
MONOCYTES # BLD AUTO: 0.5 K/UL
MONOCYTES NFR BLD: 7.3 %
NEUTROPHILS # BLD AUTO: 4.7 K/UL
NEUTROPHILS NFR BLD: 65.9 %
PHOSPHATE SERPL-MCNC: 3.3 MG/DL
PLATELET # BLD AUTO: 149 K/UL
PMV BLD AUTO: 9.5 FL
POCT GLUCOSE: 143 MG/DL (ref 70–110)
POCT GLUCOSE: 169 MG/DL (ref 70–110)
POCT GLUCOSE: 196 MG/DL (ref 70–110)
POCT GLUCOSE: 86 MG/DL (ref 70–110)
POTASSIUM SERPL-SCNC: 3.2 MMOL/L
PROT SERPL-MCNC: 6.7 G/DL
RBC # BLD AUTO: 4.03 M/UL
SODIUM SERPL-SCNC: 137 MMOL/L
WBC # BLD AUTO: 7.08 K/UL

## 2017-04-20 PROCEDURE — 87118 MYCOBACTERIC IDENTIFICATION: CPT | Mod: 91

## 2017-04-20 PROCEDURE — 86580 TB INTRADERMAL TEST: CPT | Performed by: STUDENT IN AN ORGANIZED HEALTH CARE EDUCATION/TRAINING PROGRAM

## 2017-04-20 PROCEDURE — 63600175 PHARM REV CODE 636 W HCPCS: Performed by: STUDENT IN AN ORGANIZED HEALTH CARE EDUCATION/TRAINING PROGRAM

## 2017-04-20 PROCEDURE — 93005 ELECTROCARDIOGRAM TRACING: CPT

## 2017-04-20 PROCEDURE — 11000001 HC ACUTE MED/SURG PRIVATE ROOM

## 2017-04-20 PROCEDURE — 25000003 PHARM REV CODE 250: Performed by: HOSPITALIST

## 2017-04-20 PROCEDURE — 80074 ACUTE HEPATITIS PANEL: CPT

## 2017-04-20 PROCEDURE — 93010 ELECTROCARDIOGRAM REPORT: CPT | Mod: ,,, | Performed by: INTERNAL MEDICINE

## 2017-04-20 PROCEDURE — 63600175 PHARM REV CODE 636 W HCPCS: Performed by: INTERNAL MEDICINE

## 2017-04-20 PROCEDURE — 25000003 PHARM REV CODE 250: Performed by: STUDENT IN AN ORGANIZED HEALTH CARE EDUCATION/TRAINING PROGRAM

## 2017-04-20 PROCEDURE — 80053 COMPREHEN METABOLIC PANEL: CPT

## 2017-04-20 PROCEDURE — 36415 COLL VENOUS BLD VENIPUNCTURE: CPT

## 2017-04-20 PROCEDURE — 63600175 PHARM REV CODE 636 W HCPCS

## 2017-04-20 PROCEDURE — 87186 SC STD MICRODIL/AGAR DIL: CPT

## 2017-04-20 PROCEDURE — 87118 MYCOBACTERIC IDENTIFICATION: CPT

## 2017-04-20 PROCEDURE — 94761 N-INVAS EAR/PLS OXIMETRY MLT: CPT

## 2017-04-20 PROCEDURE — 83735 ASSAY OF MAGNESIUM: CPT

## 2017-04-20 PROCEDURE — 85025 COMPLETE CBC W/AUTO DIFF WBC: CPT

## 2017-04-20 PROCEDURE — 87186 SC STD MICRODIL/AGAR DIL: CPT | Mod: 91

## 2017-04-20 PROCEDURE — 80100016 HC MAINTENANCE HEMODIALYSIS

## 2017-04-20 PROCEDURE — 84145 PROCALCITONIN (PCT): CPT

## 2017-04-20 PROCEDURE — 63600175 PHARM REV CODE 636 W HCPCS: Performed by: HOSPITALIST

## 2017-04-20 PROCEDURE — 97802 MEDICAL NUTRITION INDIV IN: CPT

## 2017-04-20 PROCEDURE — 25000003 PHARM REV CODE 250: Performed by: INTERNAL MEDICINE

## 2017-04-20 PROCEDURE — 84100 ASSAY OF PHOSPHORUS: CPT

## 2017-04-20 RX ORDER — POTASSIUM CHLORIDE 20 MEQ/1
20 TABLET, EXTENDED RELEASE ORAL
Status: COMPLETED | OUTPATIENT
Start: 2017-04-20 | End: 2017-04-20

## 2017-04-20 RX ORDER — FLUCONAZOLE 2 MG/ML
100 INJECTION, SOLUTION INTRAVENOUS
Status: DISCONTINUED | OUTPATIENT
Start: 2017-04-20 | End: 2017-04-21 | Stop reason: HOSPADM

## 2017-04-20 RX ADMIN — ASPIRIN 81 MG: 81 TABLET, COATED ORAL at 08:04

## 2017-04-20 RX ADMIN — CLOPIDOGREL BISULFATE 75 MG: 75 TABLET ORAL at 08:04

## 2017-04-20 RX ADMIN — ALLOPURINOL 100 MG: 100 TABLET ORAL at 08:04

## 2017-04-20 RX ADMIN — GABAPENTIN 300 MG: 300 CAPSULE ORAL at 08:04

## 2017-04-20 RX ADMIN — POTASSIUM CHLORIDE 20 MEQ: 20 TABLET, EXTENDED RELEASE ORAL at 11:04

## 2017-04-20 RX ADMIN — POTASSIUM CHLORIDE 20 MEQ: 20 TABLET, EXTENDED RELEASE ORAL at 08:04

## 2017-04-20 RX ADMIN — PRAVASTATIN SODIUM 40 MG: 40 TABLET ORAL at 08:04

## 2017-04-20 RX ADMIN — STANDARDIZED SENNA CONCENTRATE AND DOCUSATE SODIUM 1 TABLET: 8.6; 5 TABLET, FILM COATED ORAL at 08:04

## 2017-04-20 RX ADMIN — TUBERCULIN PURIFIED PROTEIN DERIVATIVE 5 UNITS: 5 INJECTION, SOLUTION INTRADERMAL at 02:04

## 2017-04-20 RX ADMIN — Medication 1 CAPSULE: at 08:04

## 2017-04-20 RX ADMIN — INSULIN ASPART 2 UNITS: 100 INJECTION, SOLUTION INTRAVENOUS; SUBCUTANEOUS at 04:04

## 2017-04-20 RX ADMIN — Medication: at 02:04

## 2017-04-20 RX ADMIN — CEFTAZIDIME 1 G: 2 INJECTION, POWDER, FOR SOLUTION INTRAVENOUS at 08:04

## 2017-04-20 RX ADMIN — VANCOMYCIN HYDROCHLORIDE 1000 MG: 1 INJECTION, POWDER, LYOPHILIZED, FOR SOLUTION INTRAVENOUS at 02:04

## 2017-04-20 RX ADMIN — INSULIN DETEMIR 20 UNITS: 100 INJECTION, SOLUTION SUBCUTANEOUS at 10:04

## 2017-04-20 RX ADMIN — FLUCONAZOLE 100 MG: 2 INJECTION INTRAVENOUS at 10:04

## 2017-04-20 RX ADMIN — POLYETHYLENE GLYCOL 3350 17 G: 17 POWDER, FOR SOLUTION ORAL at 08:04

## 2017-04-20 RX ADMIN — LISINOPRIL 10 MG: 5 TABLET ORAL at 08:04

## 2017-04-20 NOTE — PLAN OF CARE
Problem: Patient Care Overview  Goal: Plan of Care Review  Outcome: Ongoing (interventions implemented as appropriate)  Patient stable, plan of care reviewed with the patient and the family member, verbalized understanding, no other distress noted at this time,  will continue to monitor. On tele, no true red alarms or ectopy noted, will continue to monitor.

## 2017-04-20 NOTE — CONSULTS
[facility]  Adult Nutrition  Consult Note    SUMMARY     Recommendations    Recommendation/Intervention:   1. Rec add 2000 ADA diet restriction to diet order   2. Boost glucose- chocolate tid   3. RD to monitor    Goals: Patient to meet 85% EEN  Nutrition Goal Status: new  Communication of RD Recs: reviewed with RN    Continuum of Care Plan    Referral to Outpatient Services:  (D/C planning: Renal ADA with supplements as needed)    Reason for Assessment    Reason for Assessment: physician consult  Diagnosis:  (Infected PD cath)  Relevent Medical History: HTN, DM   Interdisciplinary Rounds: did not attend     General Information Comments: Patient reports decrease in appetite x 3 months. However, weight has been stable. Reports he does use supplements at home and at Dialysis. Reports to see his dietitian at dialysis monthly.  Denies issues with n/v/problems chewing or swallowing. Currently NPO.    Nutrition Prescription Ordered    Current Diet Order: Renal  Nutrition Order Comments: 1500 ml fluid Restriction     Evaluation of Received Nutrients/Fluid Intake      % Intake of Estimated Meals:0-25%   % Intake of Meals: NPO    I/O: Not recorded at this time      Nutrition Risk Screen     Nutrition Risk Screen: no indicators present    Nutrition/Diet History     Food Preferences: Denies cultural, Restoration, ethnic food prefererences.      Factors Affecting Nutritional Intake: decreased appetite, NPO     Labs/Tests/Procedures/Meds     Pertinent Labs Reviewed: reviewed  Pertinent Medications Reviewed: reviewed     Physical Findings    Overall Physical Appearance:  (WDL)  Oral/Mouth Cavity: WDL  Skin: intact ([place] 16)    Anthropometrics     Height (inches): 70.98 in  Weight Method: Stated  Weight (kg): 85.7 kg  Ideal Body Weight (IBW), Male: 171.88 lb     % Ideal Body Weight, Male (lb): 109.93 lb     BMI (kg/m2): 26.36  BMI Grade: 25 - 29.9 - overweight     Estimated/Assessed Needs    Weight Used For  Calorie Calculations: 85.7 kg (188 lb 15 oz)   Height (cm): 180.3 cm     Energy Need Method: [place]-St Jeor (4404-0191 kcal)     RMR ([place]-St. Jeor Equation): 1653.46      Weight Used For Protein Calculations: 85.7 kg (188 lb 15 oz)  Protein Requirements: 100-120     Fluid Need Method: RDA Method (or per MD-fluid restriction of 1500 ml)       CHO Requirement: 275 g     Assessment and Plan    Nutrition Diagnosis    Nutrition Problem: Increased Nutrient Needs  Etiology: chronic protein losing medical process  As Evidenced by: Peritoneal Dialysis  Nutrition Diagnosis Status: New    Monitor and Evaluation    Food and Nutrient Intake: energy intake, food and beverage intake  Food and Nutrient Adminstration: diet order  Anthropometric Measurements: weight, weight change  Biochemical Data, Medical Tests and Procedures: electrolyte and renal panel, glucose/endocrine profile  Nutrition-Focused Physical Findings: overall appearance    Nutrition Risk    Level of Risk:  (1 x week)    Nutrition Follow-Up    RD Follow-up?: Yes

## 2017-04-20 NOTE — SUBJECTIVE & OBJECTIVE
Past Medical History:   Diagnosis Date    Anemia in chronic kidney disease     Arthritis     unable to walk due to arthritis in knees.     Coronary artery disease     Diabetes mellitus     Diabetes mellitus, type 2     Diabetic nephropathy 4/22/2015    Diastolic dysfunction     Hypertension     Myocardial infarction 2001    Peritoneal dialysis catheter in place 2014    Secondary hyperparathyroidism     Stroke 2001, 2013, 10/2014    weak, slurred speech, equal hand grasp/equal leg movements    Type 2 diabetes mellitus for many yrs about 25 yrs 4/22/2015    Unspecified disorder of kidney and ureter        Past Surgical History:   Procedure Laterality Date    AV FISTULA PLACEMENT Left 2011    wrist;    CATARACT EXTRACTION      NECK SURGERY      PERITONEAL CATHETER INSERTION      PORTACATH PLACEMENT  march 2016    left chest    SPINE SURGERY         Review of patient's allergies indicates:  No Known Allergies    Medications:  Prescriptions Prior to Admission   Medication Sig    allopurinol (ZYLOPRIM) 100 MG tablet Take 1 tablet (100 mg total) by mouth once daily.    aspirin (ECOTRIN) 81 MG EC tablet Take 1 tablet (81 mg total) by mouth once daily.    clopidogrel (PLAVIX) 75 mg tablet Take 1 tablet (75 mg total) by mouth once daily.    docusate sodium (COLACE) 100 MG capsule Take 1 capsule (100 mg total) by mouth 2 (two) times daily as needed for Constipation.    EX-LAX, SENNOSIDES, ORAL Take by mouth as needed. As directed    gabapentin (NEURONTIN) 300 MG capsule Take 1 capsule (300 mg total) by mouth every evening. (Patient taking differently: Take 300 mg by mouth once daily. )    insulin aspart protamine-insulin aspart (NOVOLOG 70/30) 100 unit/mL (70-30) InPn pen Inject 5 Units into the skin 3 (three) times daily with meals. (Patient taking differently: Inject 8 Units into the skin 3 (three) times daily with meals. )    insulin glargine (LANTUS SOLOSTAR) 100 unit/mL (3 mL) InPn pen Inject  20 Units into the skin every evening.    lisinopril 10 MG tablet Take 1 tablet (10 mg total) by mouth once daily.    NYSTOP powder APPLY TO AFFECTED AREA Q 6 H TO SITE    ondansetron (ZOFRAN-ODT) 4 MG TbDL     potassium chloride SA (K-DUR,KLOR-CON) 20 MEQ tablet TK 1 T PO ONCE  D    pravastatin (PRAVACHOL) 80 MG tablet Take 40 mg by mouth once daily.    senna-docusate 8.6-50 mg (PERICOLACE) 8.6-50 mg per tablet Take 1 tablet by mouth 2 (two) times daily.    vitamin renal formula, B-complex-vitamin c-folic acid, (NEPHROCAP) 1 mg Cap Take 1 capsule by mouth once daily.     Antibiotics     Start     Stop Route Frequency Ordered    04/19/17 1330  ceftAZIDime 1,000 mg, vancomycin 1,000 mg in Soln Dianeal PD fluid 2,500 mL      -- IP Every 24 hours (non-standard times) 04/19/17 1225        Antifungals     None        Antivirals     None           Immunization History   Administered Date(s) Administered    PPD Test 11/04/2016    Pneumococcal Conjugate - 13 Valent 06/17/2016, 07/14/2016    Tdap 04/12/2016       Family History     Problem Relation (Age of Onset)    Cancer Mother, Father    No Known Problems Sister, Brother, Sister, Sister, Brother        Social History     Social History    Marital status:      Spouse name: N/A    Number of children: N/A    Years of education: N/A     Occupational History          disable     Social History Main Topics    Smoking status: Never Smoker    Smokeless tobacco: Never Used    Alcohol use No    Drug use: No    Sexual activity: Yes     Partners: Female     Other Topics Concern    None     Social History Narrative     Review of Systems   Constitutional: Negative for chills and fever.   HENT: Negative for congestion.    Eyes: Negative for visual disturbance.   Respiratory: Negative for shortness of breath.    Cardiovascular: Negative for chest pain.   Genitourinary: Negative for dysuria and hematuria.        No foul-smelling urine, dysuria, hematuria   Skin:  Negative for rash.   Neurological: Negative for weakness.   Psychiatric/Behavioral: Negative for behavioral problems.   All other systems reviewed and are negative.    Objective:     Vital Signs (Most Recent):  Temp: 98.5 °F (36.9 °C) (04/20/17 0710)  Pulse: 96 (04/20/17 0710)  Resp: 18 (04/20/17 0710)  BP: 110/60 (04/20/17 0710)  SpO2: 99 % (04/20/17 0754) Vital Signs (24h Range):  Temp:  [97.5 °F (36.4 °C)-99 °F (37.2 °C)] 98.5 °F (36.9 °C)  Pulse:  [] 96  Resp:  [18-20] 18  SpO2:  [99 %-100 %] 99 %  BP: ()/(51-69) 110/60     Weight: 85.7 kg (189 lb)  Body mass index is 26.36 kg/(m^2).    Estimated Creatinine Clearance: 12.1 mL/min (based on Cr of 6.2).    Physical Exam   Constitutional: He is oriented to person, place, and time. He appears well-developed and well-nourished. No distress.   HENT:   Head: Normocephalic and atraumatic.   Eyes: Conjunctivae are normal. Pupils are equal, round, and reactive to light.   Neck: Normal range of motion.   Cardiovascular: Normal rate, regular rhythm and normal heart sounds.    No murmur heard.  Pulmonary/Chest: Effort normal. No respiratory distress. He has no wheezes.   R-sided diminished breath sounds   Abdominal: Soft. Bowel sounds are normal. He exhibits no distension. There is no tenderness. There is no rebound and no guarding.   Musculoskeletal: Normal range of motion. He exhibits no edema.   Neurological: He is alert and oriented to person, place, and time. No cranial nerve deficit.   Mildly dysarthria   Skin: Skin is warm and dry. No rash noted.   Yellow drainage at PD cath site that is expressible.  1-2cm indurated SQ nodule to left of PD cath site, non-tender to palpation, non-fluctuant in nature       Significant Labs:   CBC:   Recent Labs  Lab 04/19/17  1120 04/20/17  0405   WBC 10.36 7.08   HGB 12.4* 10.9*   HCT 36.8* 33.1*    149*     CMP:   Recent Labs  Lab 04/19/17  1120 04/20/17  0405    137   K 3.3* 3.2*    102   CO2 25 24    GLU 84 141*   BUN 34* 34*   CREATININE 6.5* 6.2*   CALCIUM 9.7 8.7   PROT 8.5* 6.7   ALBUMIN 2.6* 2.1*   BILITOT 0.5 0.5   ALKPHOS 95 74   AST 32 29   ALT 53* 54*   ANIONGAP 13 11   EGFRNONAA 8* 8*     Urinalysis:   4/19/2017 17:48   Specimen UA Urine, Clean Catch   Color, UA Yellow   pH, UA 6.0   Specific [place], UA 1.025   Appearance, UA Hazy (A)   Protein, UA 2+ (A)   Glucose, UA Negative   Ketones, UA Negative   Occult Blood UA Trace (A)   Nitrite, UA Negative   Urobilinogen, UA Negative   Bilirubin (UA) Negative   Leukocytes, UA 1+ (A)   RBC, UA 10 (H)   WBC, UA 20 (H)   Bacteria, UA Few (A)     Peritoneal Fluid Analysis:   4/19/2017 14:39   Fluid Color Colorless   Fluid Appearance Clear   Body Fluid Type Dialysate   WBC, Body Fluid 22   Segs, Fluid 20   Lymphs, Fluid 78   Monocytes/Macrophages, Fluid 2       Procalcitonin:   Recent Labs  Lab 04/19/17  1120   PROCAL 12.46*     Microbiology:  4/19: Blood cultures x 2: NGTD  4/19: Blood cultures x 2: NGTD  4/19: Urine culture (clean catch): in process  4/19: Peritoneal Fluid (gram stain): in process  4/19: Peritoneal Fluid (aerobic) x 2: in process  4/19: Wound culture (aerobic): in process  4/19: Wound culture (anaerobic): in process    History:  11/2016: Urine Cx - Klebsiella (pansensitive)  03/2016: Blood Cx - MRSA (sensitive to Vanc)  11/2015: Urine Cx - VRE (sensitive to Linezolid)  08/2015: Urine Cx - Pseudomonas (pansensitive)      Significant Imaging: CXR: I have reviewed all pertinent results/findings within the past 24 hours:  see below  U/S: I have reviewed all pertinent results/findings within the past 24 hours:  see below     4/19: CXR  FINDINGS: The mediastinal structures are midline.  The cardiac silhouette is not enlarged.  The left lung appears clear.  There is persistent right pleural fluid with adjacent atelectasis versus consolidation, similar to previous exam allowing for differences in patient positioning.  No pneumothorax is seen.  No  osseous abnormalities are identified.    4/19: U/S Soft Tissue (LLQ at SQ PD cath site)  The ultrasound of anterior abdominal wall peritoneal dialysis catheter entrance site.  Complex fluid collection adjacent to catheter, superficial, subcutaneous, 0.7 CM.

## 2017-04-20 NOTE — PROGRESS NOTES
[facility]  Infectious Disease  Progress Note    Patient Name: Terry Cote  MRN: [id number]  Admission Date: 4/19/2017  Length of Stay: 1 days  Attending Physician: Sg Bradley MD  Primary Care Provider: Duncan Rincon MD    Isolation Status: No active isolations  Assessment/Plan:      Peritoneal dialysis catheter site infection  Terry Cote is a 68 yo AAM with PMH of CAD, HFpEF, CVA, DM2 with nephropathy, ESRD on PD, HTN, hyperparathyroidism, arthritis who was admitted to [facility] on 4/19 for PD cath site infection for which he is currently on PD administered Vanc/Ceftaz. Peritoneal fluid analysis reassuring against itz peritonitis with cultures pending.  Based on further review, must also consider UTI based on UA (with history of Klebsiella, VRE, and Pseudomonas on prior cultures). Keep in mind, patient with recent thoracentesis on 4/17 with no fluid analysis collected, which could also be a source of infection; however, less likely as he has no respiratory systems.    1) Continue Vanc/Ceftaz for MRSA/gram positive/gramnegative/Pseudomonas coverage)  2) If patient worsens, would transition to Linezolid based on concerning UA and h/o VRE  3) Can trend pro-calcitonin to monitor for improvement on antibiotics.  4) Surgery consulted/following for PD cath removal and evaluation of SQ fluid collection for possible drainage      Thank you for your consult. I will follow-up with patient. Please contact us if you have any additional questions.    Suzanne Daniel MD  Infectious Disease, PG-3  [facility]    Subjective:     Principal Problem: PD Cath Site Infection    HPI: Terry Cote is a 68 yo AAM with PMH of CAD, HFpEF, CVA (x3, last 2014 with L facial droop/slurred speech), DM2 with nephropathy, ESRD on PD, HTN, hyperparathyroidism, arthritis who was admitted to [facility] on 4/19 for presumed PD cath infection. Patient was in his USOH until 4/15  when patient noted yellow drainage around PD cath site with associated warmth around site itself. He denies any fevers, N/V/D, dysuria. Patient normally performs home PD nightly and follows with [facility] where he was last seen on 4/19 at which time he was referred to ED for hypotension and hypoglycemia. On admission, patient had WBC 10.36 and Procalcitonin of 12.46. He was pan-cultured (blood, urine, peritoneal fluid) and started on Vanc/Ceftaz with PD. Ultrasound of PD cath demonstrated clear catheter tubing with noted area of complex subcutaneous fluid collection. ID consulted for antibiotic recommendations. Of note, patient also with history of recurrent R pleural effusion with most recent thoracentesis on 4/17 and three UTIs dating back to 2015.      Past Medical History:   Diagnosis Date    Anemia in chronic kidney disease     Arthritis     unable to walk due to arthritis in knees.     Coronary artery disease     Diabetes mellitus     Diabetes mellitus, type 2     Diabetic nephropathy 4/22/2015    Diastolic dysfunction     Hypertension     Myocardial infarction 2001    Peritoneal dialysis catheter in place 2014    Secondary hyperparathyroidism     Stroke 2001, 2013, 10/2014    weak, slurred speech, equal hand grasp/equal leg movements    Type 2 diabetes mellitus for many yrs about 25 yrs 4/22/2015    Unspecified disorder of kidney and ureter        Past Surgical History:   Procedure Laterality Date    AV FISTULA PLACEMENT Left 2011    wrist;    CATARACT EXTRACTION      NECK SURGERY      PERITONEAL CATHETER INSERTION      PORTACATH PLACEMENT  march 2016    left chest    SPINE SURGERY         Review of patient's allergies indicates:  No Known Allergies    Medications:  Prescriptions Prior to Admission   Medication Sig    allopurinol (ZYLOPRIM) 100 MG tablet Take 1 tablet (100 mg total) by mouth once daily.    aspirin (ECOTRIN) 81 MG EC tablet Take 1 tablet (81 mg total) by  mouth once daily.    clopidogrel (PLAVIX) 75 mg tablet Take 1 tablet (75 mg total) by mouth once daily.    docusate sodium (COLACE) 100 MG capsule Take 1 capsule (100 mg total) by mouth 2 (two) times daily as needed for Constipation.    EX-LAX, SENNOSIDES, ORAL Take by mouth as needed. As directed    gabapentin (NEURONTIN) 300 MG capsule Take 1 capsule (300 mg total) by mouth every evening. (Patient taking differently: Take 300 mg by mouth once daily. )    insulin aspart protamine-insulin aspart (NOVOLOG 70/30) 100 unit/mL (70-30) InPn pen Inject 5 Units into the skin 3 (three) times daily with meals. (Patient taking differently: Inject 8 Units into the skin 3 (three) times daily with meals. )    insulin glargine (LANTUS SOLOSTAR) 100 unit/mL (3 mL) InPn pen Inject 20 Units into the skin every evening.    lisinopril 10 MG tablet Take 1 tablet (10 mg total) by mouth once daily.    NYSTOP powder APPLY TO AFFECTED AREA Q 6 H TO SITE    ondansetron (ZOFRAN-ODT) 4 MG TbDL     potassium chloride SA (K-DUR,KLOR-CON) 20 MEQ tablet TK 1 T PO ONCE  D    pravastatin (PRAVACHOL) 80 MG tablet Take 40 mg by mouth once daily.    senna-docusate 8.6-50 mg (PERICOLACE) 8.6-50 mg per tablet Take 1 tablet by mouth 2 (two) times daily.    vitamin renal formula, B-complex-vitamin c-folic acid, (NEPHROCAP) 1 mg Cap Take 1 capsule by mouth once daily.     Antibiotics     Start     Stop Route Frequency Ordered    04/19/17 1330  ceftAZIDime 1,000 mg, vancomycin 1,000 mg in Soln Dianeal PD fluid 2,500 mL      -- IP Every 24 hours (non-standard times) 04/19/17 1225        Antifungals     None        Antivirals     None           Immunization History   Administered Date(s) Administered    PPD Test 11/04/2016    Pneumococcal Conjugate - 13 Valent 06/17/2016, 07/14/2016    Tdap 04/12/2016       Family History     Problem Relation (Age of Onset)    Cancer Mother, Father    No Known Problems Sister, Brother, Sister, Sister, Brother         Social History     Social History    Marital status:      Spouse name: N/A    Number of children: N/A    Years of education: N/A     Occupational History          disable     Social History Main Topics    Smoking status: Never Smoker    Smokeless tobacco: Never Used    Alcohol use No    Drug use: No    Sexual activity: Yes     Partners: Female     Other Topics Concern    None     Social History Narrative     Review of Systems   Constitutional: Negative for chills and fever.   HENT: Negative for congestion.    Eyes: Negative for visual disturbance.   Respiratory: Negative for shortness of breath.    Cardiovascular: Negative for chest pain.   Genitourinary: Negative for dysuria and hematuria.        No foul-smelling urine, dysuria, hematuria   Skin: Negative for rash.   Neurological: Negative for weakness.   Psychiatric/Behavioral: Negative for behavioral problems.   All other systems reviewed and are negative.    Objective:     Vital Signs (Most Recent):  Temp: 98.5 °F (36.9 °C) (04/20/17 0710)  Pulse: 96 (04/20/17 0710)  Resp: 18 (04/20/17 0710)  BP: 110/60 (04/20/17 0710)  SpO2: 99 % (04/20/17 0754) Vital Signs (24h Range):  Temp:  [97.5 °F (36.4 °C)-99 °F (37.2 °C)] 98.5 °F (36.9 °C)  Pulse:  [] 96  Resp:  [18-20] 18  SpO2:  [99 %-100 %] 99 %  BP: ()/(51-69) 110/60     Weight: 85.7 kg (189 lb)  Body mass index is 26.36 kg/(m^2).    Estimated Creatinine Clearance: 12.1 mL/min (based on Cr of 6.2).    Physical Exam   Constitutional: He is oriented to person, place, and time. He appears well-developed and well-nourished. No distress.   HENT:   Head: Normocephalic and atraumatic.   Eyes: Conjunctivae are normal. Pupils are equal, round, and reactive to light.   Neck: Normal range of motion.   Cardiovascular: Normal rate, regular rhythm and normal heart sounds.    No murmur heard.  Pulmonary/Chest: Effort normal. No respiratory distress. He has no wheezes.   R-sided diminished breath  sounds   Abdominal: Soft. Bowel sounds are normal. He exhibits no distension. There is no tenderness. There is no rebound and no guarding.   Musculoskeletal: Normal range of motion. He exhibits no edema.   Neurological: He is alert and oriented to person, place, and time. No cranial nerve deficit.   Mildly dysarthria   Skin: Skin is warm and dry. No rash noted.   Yellow drainage at PD cath site that is expressible.  1-2cm indurated SQ nodule to left of PD cath site, non-tender to palpation, non-fluctuant in nature       Significant Labs:   CBC:   Recent Labs  Lab 04/19/17  1120 04/20/17  0405   WBC 10.36 7.08   HGB 12.4* 10.9*   HCT 36.8* 33.1*    149*     CMP:   Recent Labs  Lab 04/19/17  1120 04/20/17  0405    137   K 3.3* 3.2*    102   CO2 25 24   GLU 84 141*   BUN 34* 34*   CREATININE 6.5* 6.2*   CALCIUM 9.7 8.7   PROT 8.5* 6.7   ALBUMIN 2.6* 2.1*   BILITOT 0.5 0.5   ALKPHOS 95 74   AST 32 29   ALT 53* 54*   ANIONGAP 13 11   EGFRNONAA 8* 8*     Urinalysis:   4/19/2017 17:48   Specimen UA Urine, Clean Catch   Color, UA Yellow   pH, UA 6.0   Specific [place], UA 1.025   Appearance, UA Hazy (A)   Protein, UA 2+ (A)   Glucose, UA Negative   Ketones, UA Negative   Occult Blood UA Trace (A)   Nitrite, UA Negative   Urobilinogen, UA Negative   Bilirubin (UA) Negative   Leukocytes, UA 1+ (A)   RBC, UA 10 (H)   WBC, UA 20 (H)   Bacteria, UA Few (A)     Peritoneal Fluid Analysis:   4/19/2017 14:39   Fluid Color Colorless   Fluid Appearance Clear   Body Fluid Type Dialysate   WBC, Body Fluid 22   Segs, Fluid 20   Lymphs, Fluid 78   Monocytes/Macrophages, Fluid 2       Procalcitonin:   Recent Labs  Lab 04/19/17  1120   PROCAL 12.46*     Microbiology:  4/19: Blood cultures x 2: NGTD  4/19: Blood cultures x 2: NGTD  4/19: Urine culture (clean catch): in process  4/19: Peritoneal Fluid (gram stain): in process  4/19: Peritoneal Fluid (aerobic) x 2: in process  4/19: Wound culture (aerobic): in process  4/19:  Wound culture (anaerobic): in process    History:  11/2016: Urine Cx - Klebsiella (pansensitive)  03/2016: Blood Cx - MRSA (sensitive to Vanc)  11/2015: Urine Cx - VRE (sensitive to Linezolid)  08/2015: Urine Cx - Pseudomonas (pansensitive)      Significant Imaging: CXR: I have reviewed all pertinent results/findings within the past 24 hours:  see below  U/S: I have reviewed all pertinent results/findings within the past 24 hours:  see below     4/19: CXR  FINDINGS: The mediastinal structures are midline.  The cardiac silhouette is not enlarged.  The left lung appears clear.  There is persistent right pleural fluid with adjacent atelectasis versus consolidation, similar to previous exam allowing for differences in patient positioning.  No pneumothorax is seen.  No osseous abnormalities are identified.    4/19: U/S Soft Tissue (LLQ at SQ PD cath site)  The ultrasound of anterior abdominal wall peritoneal dialysis catheter entrance site.  Complex fluid collection adjacent to catheter, superficial, subcutaneous, 0.7 CM.

## 2017-04-20 NOTE — PROGRESS NOTES
Vancomycin frequency changed to every_24_ hrs based on patients renal function   (CrCl_12.1 ml/min) per P&T approved pharmacy protocol.

## 2017-04-20 NOTE — ASSESSMENT & PLAN NOTE
Terry Cote is a 68 yo AAM with PMH of CAD, HFpEF, CVA, DM2 with nephropathy, ESRD on PD, HTN, hyperparathyroidism, arthritis who was admitted to [facility] on 4/19 for PD cath site infection for which he is currently on PD administered Vanc/Ceftaz. Peritoneal fluid analysis reassuring against itz peritonitis with cultures pending.  Based on further review, must also consider UTI based on UA (with history of Klebsiella, VRE, and Pseudomonas on prior cultures). Keep in mind, patient with recent thoracentesis on 4/17 with no fluid analysis collected, which could also be a source of infection; however, less likely as he has no respiratory systems.    1) Continue Vanc/Ceftaz for MRSA/gram positive/gramnegative/Pseudomonas coverage)  2) If patient worsens, would transition to Linezolid based on concerning UA and h/o VRE  3) Can trend pro-calcitonin to monitor for improvement on antibiotics.  4) Surgery consulted/following for PD cath removal and evaluation of SQ fluid collection for possible drainage

## 2017-04-20 NOTE — PROGRESS NOTES
"[facility] Med Resident HO-1 Progress Note    Subjective:      Terry Cote is a 67 y.o.  male who is being followed by the [facility] Med service at [facility] for PD line infection.     Patient states he is doing well this morning. Patient endorses slight discomfort around the PD catheter site.  Denies chest pain, shortness of breath, nausea/vomtiing.      Objective:   Last 24 Hour Vital Signs:  BP  Min: 97/51  Max: 143/66  Temp  Av.2 °F (36.8 °C)  Min: 97.5 °F (36.4 °C)  Max: 99 °F (37.2 °C)  Pulse  Av.7  Min: 95  Max: 118  Resp  Av.7  Min: 18  Max: 20  SpO2  Av %  Min: 98 %  Max: 100 %  Height  Av' 11" (180.3 cm)  Min: 5' 11" (180.3 cm)  Max: 5' 11" (180.3 cm)  Weight  Av.7 kg (189 lb)  Min: 85.7 kg (189 lb)  Max: 85.7 kg (189 lb)  I/O last 3 completed shifts:  In: -   Out: 200 [Urine:200]    Physical Examination:  General Appearance:    Alert, cooperative, NAD, appears stated age   Head:    Normocephalic, without obvious abnormality, atraumatic, MMM   Eyes:    PERRL, conjunctiva/corneas clear, EOM's intact grossly   Neck:   Supple, symmetrical, trachea midline, no adenopathy;       Lungs:     Clear to auscultation bilaterally, respirations unlabored   Chest Wall:    No tenderness or deformity    Heart:    Regular rate and rhythm, S1 and S2 normal, no murmur, rub      Abdomen:     Soft, non-tender, +BS, PD catheter in place, small amount of yellow discharge around site.   Extremities:   Extremities normal, atraumatic, no cyanosis or edema   Skin:   Skin color, texture; no rashes or lesions on exposed skin   Neurologic:   A&O, moving all four extremities, slowed speech        Laboratory:  Laboratory Data Reviewed: yes  Pertinent Findings:    Recent Labs  Lab 17  0830 17  1120 17  0405   WBC 8.59 10.36 7.08   HGB 11.0* 12.4* 10.9*   HCT 33.5* 36.8* 33.1*    200 149*   MCV 82 83 82   RDW 15.1* 15.1* 15.2*   NA  --  139 137   K  --  3.3* 3.2*   CL  " --  101 102   CO2  --  25 24   BUN  --  34* 34*   CREATININE  --  6.5* 6.2*   GLU  --  84 141*   PROT  --  8.5* 6.7   ALBUMIN  --  2.6* 2.1*   BILITOT  --  0.5 0.5   AST  --  32 29   ALKPHOS  --  95 74   ALT  --  53* 54*         Microbiology Data Reviewed: yes  Pertinent Findings:  Blood culture 4/19: penging   Peritoneal fluid culture pending    Radiology Data Reviewed: yes  Pertinent Findings:  US of abdomen: collection around catheter    Current Medications:     Infusions:        Scheduled:   allopurinol  100 mg Oral Daily    aspirin  81 mg Oral Daily    Dianeal PD with additives   Intraperitoneal Q24H    clopidogrel  75 mg Oral Daily    gabapentin  300 mg Oral QHS    insulin detemir  20 Units Subcutaneous QHS    lisinopril  10 mg Oral Daily    polyethylene glycol  17 g Oral Daily    pravastatin  40 mg Oral Daily    senna-docusate 8.6-50 mg  1 tablet Oral BID    vitamin renal formula (B-complex-vitamin c-folic acid)  1 capsule Oral Daily        PRN:  acetaminophen, dextrose 50%, dextrose 50%, docusate sodium, glucagon (human recombinant), glucose, glucose, insulin aspart, ondansetron    Antibiotics and Day Number of Therapy:  Ceftazidime 4/19-    Lines and Day Number of Therapy:  PIV    Assessment:     Terry Cote is a 67 y.o.male with  Patient Active Problem List    Diagnosis Date Noted    Malnutrition 04/19/2017    Peritoneal dialysis catheter site infection 04/19/2017    Pleural effusion, right 02/12/2017    Hyperlipidemia 02/12/2017    History of stroke 02/12/2017    Right-sided chest pain 02/12/2017    Pleural effusion 09/28/2016    Dysphagia 07/15/2016    Syncope 06/07/2016    Anemia of chronic disease 03/15/2016    Essential hypertension 01/03/2016    Coronary artery disease involving native coronary artery of native heart without angina pectoris 01/03/2016    Pharyngoesophageal dysphagia 12/08/2015    Cerebral infarct     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 08/25/2015    Hand muscle atrophy 08/25/2015    Vitamin D deficiency 08/22/2015    Type 2 diabetes mellitus with diabetic chronic kidney disease 08/21/2015    (HFpEF) heart failure with preserved ejection fraction 08/19/2015    Elevated PSA 08/19/2015    Transient alteration of awareness 08/19/2015    Neuropathy 07/23/2015    DM type 2 causing renal disease 07/23/2015    ESRD on peritoneal dialysis 07/23/2015    Stroke     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 05/14/2015    Organ transplant candidate 04/22/2015    Pre-transplant evaluation for chronic kidney disease 04/22/2015    Type 2 diabetes mellitus for many yrs about 25 yrs 04/22/2015    Diabetic nephropathy 04/22/2015    Secondary hyperparathyroidism     Anemia in chronic kidney disease     ESRD on dialysis 03/20/2015    Diabetes mellitus, type 2 01/23/2015    Dyslipidemia associated with type 2 diabetes mellitus 01/23/2015    Hypertension associated with diabetes 01/23/2015    Hyperuricemia 01/23/2015    ESRD (end stage renal disease) on dialysis 01/16/2015     Class: Acute    CHF with left ventricular diastolic dysfunction, NYHA class 1 12/19/2014    Unspecified transient cerebral ischemia 12/18/2014        Plan:     Peritoneal catheter infection  - patient sent from [facility] for evaluation  - nephrology on board, appreciate recs  - ceftazidine via PD  - blood and peritoneal cultures pending   - patient remains afebrile without leukocytosis  - US abdomen: fluid collection  - will consult Dr. Flynn, appreciate recs      ESRD  - Cr at baseline per records  - Continued PD nightly with abx  - Nephrology consulted, appreciate recs      DM2  - A1c 5.5 2/8/17  - patient taking insulin glargine 20u nightly and aspart 5u with meals  - patients home sugars run in high 100s, will monitor       HTN  - patient hypotensive on admission, will hold antihypertensives at this time    Hypokalemia  - 3.2, repleted  - will  monitor and replete as needed      Hypomagnesemia   - repeleted  - will monitor and replete as needed     HFpEF  - discharged on metoprolol but not on home med list   - echo with normal EF and LV diastolic dysfunction  - will monitor      CAD  - continue ASA, plavix, statin  - no acute issues      H/o Stroke  - continued ASA, plavix, statin       Constipation  - last BM 2 days prior to admission, did not take stool softener for two days  - will continue stool softener and monitor       HLD  - continue home statin       Anemia of Chronic Disease  - worked up 11/2016  - likely 2/2 chronic renal disease  - h/h stable   - will monitor      Diet renal  PPX hep  Dispo pending possible intervention     Gabriela Ayers  \[facility]\"" Internal Medicine HO-1  U Med Service Team B    \[facility]\"" Medicine Hospitalist Pager numbers:   \[facility]\"" Hospitalist Medicine Team A (Roxane/Aide): [phone]  \[facility]\"" Hospitalist Medicine Team B (Mirna/Felipe):  [phone]

## 2017-04-20 NOTE — PLAN OF CARE
Recommendations     Recommendation/Intervention:   1. Rec add 2000 ADA diet restriction to diet order   2. Boost glucose- chocolate tid   3. RD to monitor     Goals: Patient to meet 85% EEN  Nutrition Goal Status: new  Communication of RD Recs: reviewed with RN     Continuum of Care Plan     Referral to Outpatient Services: (D/C planning: Renal ADA with supplements as needed)

## 2017-04-20 NOTE — PROGRESS NOTES
.Pharmacy New Medication Education    Patient accepted medication education.    Pharmacy educated patient on the following medications, using the teach-back method.   Apap  Allopurinol  Asa  Roftaz  Vancomycin  Plavix  Colace  Gabapentin  Novolog  Detemir  Lisinopril  Zofran  Miralax  Kdur  Pravastatin  Senokot  nephrocaps  Learners of pharmacy medication education included:  patient    Patient +/- learner response:  verbalize understanding

## 2017-04-20 NOTE — PLAN OF CARE
Problem: Kidney Disease, Chronic/End Stage Renal Disease (Adult)  Goal: Signs and Symptoms of Listed Potential Problems Will be Absent, Minimized or Managed (Kidney Disease, Chronic/End Stage Renal Disease)  Signs and symptoms of listed potential problems will be absent, minimized or managed by discharge/transition of care (reference Kidney Disease, Chronic/End Stage Renal Disease (Adult) CPG).   Outcome: Ongoing (interventions implemented as appropriate)  Patient on Tele NSR. Peritoneal dialysis started and patient tolerated well. Small drainage noted at site and dressed. Patient denies any pain or SOB during shift. Bed alarm activated. Call light within reach. Will continue to monitor.

## 2017-04-20 NOTE — PHYSICIAN QUERY
PT Name: Terry Cote  MR #: [id number]    Physician Query Form - Consultant Diagnosis Clarification     CDS/: Mica Rios               Contact information:[email or web address]  This form is a permanent document in the medical record.     Query Date: April 20, 2017      By submitting this query, we are merely seeking further clarification of documentation.  Please utilize your independent clinical judgment when addressing the question(s) below.      The Medical record contains the following:   Diagnosis Supporting Clinical Information Location in Medical Record    Severe sepsis with presumable PD cath tunneled infection            Ceftazidime 1000mg Intraperitoneal  Vancomycin 1000mg Intraperitoneal      WBC = 10.36  Procalcitonin = 12.46        HR = 118  BP = 97/51  Temp = 99.0 Nephrology CN 4/19          MAR        Labs 4/19            V/S Flowsheet 4/19         Do you agree with the Consultants diagnosis of ______Severe Sepsis___________________?                 [   ]   Yes               [   ]   Yes, but it resolved prior to my assessment of the patient               [   ]   No                [   ]   Clinically insignificant               [  x ]   Clinically undetermined               [   ]   Other/Clarification of findings: ___________________________________________

## 2017-04-20 NOTE — PLAN OF CARE
Problem: Hemodialysis (Adult)  Goal: Signs and Symptoms of Listed Potential Problems Will be Absent, Minimized or Managed (Hemodialysis)  Signs and symptoms of listed potential problems will be absent, minimized or managed by discharge/transition of care (reference Hemodialysis (Adult) CPG).  Outcome: Ongoing (interventions implemented as appropriate)    04/20/17 1353   Hemodialysis   Problems Assessed (Hemodialysis) all   Problems Present (Hemodialysis) fluid imbalance;electrolyte imbalance   POC discussed with patient  1 hr HD today

## 2017-04-20 NOTE — CONSULTS
Nephrology Consult  H&P      Consult Requested By: Sg Bradley MD  Reason for Consult: ESRD on PD    SUBJECTIVE:     History of Present Illness: obtained from chart review: pt very lethargic  Patient is a 67 y.o. male  Send from [facility]--> seen by nurse today--> BP in 80s, glucose in 40s, tenderness along the tunnel,     ESRD on PD, follows with me in [facility]: CCPD 2L, 9 hours, last fill 2 L.  [facility]  Wheel chair bound but able to get out of the bed and into wheelchair and back by himself. Lives with mother in law and wife Ana. Excellent family support      Review of Systems   Constitutional: Negative for chills and fever.   Respiratory: Negative for cough and shortness of breath.    Cardiovascular: Negative for chest pain and leg swelling.   Gastrointestinal: Positive for abdominal pain (tenderness along side the tunnel). Negative for nausea.   Genitourinary: Negative for dysuria and urgency.   Musculoskeletal: Negative for myalgias.   Skin: Negative for itching and rash.   Neurological: Negative for dizziness and tingling.   Endo/Heme/Allergies: Negative for environmental allergies. Does not bruise/bleed easily.       Past Medical History:   Diagnosis Date    Anemia in chronic kidney disease     Arthritis     unable to walk due to arthritis in knees.     Coronary artery disease     Diabetes mellitus     Diabetes mellitus, type 2     Diabetic nephropathy 4/22/2015    Diastolic dysfunction     Hypertension     Myocardial infarction 2001    Peritoneal dialysis catheter in place 2014    Secondary hyperparathyroidism     Stroke 2001, 2013, 10/2014    weak, slurred speech, equal hand grasp/equal leg movements    Type 2 diabetes mellitus for many yrs about 25 yrs 4/22/2015    Unspecified disorder of kidney and ureter      Past Surgical History:   Procedure Laterality Date    AV FISTULA PLACEMENT Left 2011    wrist;    CATARACT EXTRACTION      NECK SURGERY      PERITONEAL CATHETER  INSERTION      PORTACATH PLACEMENT  march 2016    left chest    SPINE SURGERY       Family History   Problem Relation Age of Onset    Cancer Mother     Cancer Father     No Known Problems Sister     No Known Problems Brother     No Known Problems Sister     No Known Problems Sister     No Known Problems Brother      Social History   Substance Use Topics    Smoking status: Never Smoker    Smokeless tobacco: Never Used    Alcohol use No       Review of patient's allergies indicates:  No Known Allergies         OBJECTIVE:     Vital Signs (Most Recent)  Vitals:    04/19/17 1300 04/19/17 1349 04/19/17 1728 04/19/17 2017   BP:  (!) 98/57 132/69 (!) 143/66   BP Location:    Right arm   Patient Position:    Lying   BP Method:    Automatic   Pulse:  99 102 95   Resp: 18 18  20   Temp:    99 °F (37.2 °C)   TempSrc:    Oral   SpO2:  99%  99%   Weight:       Height:             Physical Exam   Constitutional: He is oriented to person, place, and time and well-developed, well-nourished, and in no distress. No distress.   HENT:   Head: Atraumatic.   Mouth/Throat: Oropharynx is clear and moist.   Eyes: Pupils are equal, round, and reactive to light. No scleral icterus.   Neck: Neck supple. No JVD present.   Cardiovascular: Normal rate, regular rhythm, normal heart sounds and intact distal pulses.  Exam reveals no friction rub.    No murmur heard.  Pulmonary/Chest: Effort normal and breath sounds normal. No respiratory distress. He has no wheezes. He has no rales.   Abdominal: Soft. Bowel sounds are normal. He exhibits no distension. There is no tenderness. There is no rebound and no guarding.       Musculoskeletal: He exhibits edema (1+ RLE 2+ LLE).   Neurological: He is alert and oriented to person, place, and time.   L facial droop - unchanged    Skin: Skin is warm and dry. No rash noted. No erythema.       Laboratory:    Recent Labs  Lab 04/17/17  0830 04/19/17  1120   WBC 8.59 10.36   HGB 11.0* 12.4*   HCT 33.5*  36.8*    200   MONO 8.1  0.7 6.8  0.7       Recent Labs  Lab 04/19/17  1120      K 3.3*      CO2 25   BUN 34*   CREATININE 6.5*   CALCIUM 9.7   PHOS 3.0       Diagnostic Results:  X-Ray: Reviewed  ECHO: Reviewed 12/18/14 1 - Normal left ventricular systolic function (EF 60-65%).   2 - Concentric hypertrophy.   3 - Moderate left atrial enlargement.   4 - Left ventricular diastolic dysfunction.     ASSESSMENT/PLAN:   1. Severe sepsis with presumable PD cath tunneled infection - cultures form exit site, PD fluid and blood  Check US of tunnel  If any collection will consult Dr. Flynn    IP ceftazidime and Vancomycin    2. ESRD - on PD: CCPD 10 hours, 5 exchanges 2.5L, day dwell 2L  Dry weight 82kg    PD tonight as per regular prescription + Abx (all 1.5 sice pt is hypotensive)    3. HTN - hypotensive today, treating for sepsis as above  4. Anemia - on outpt venofer and epo twice a month - hb at goal today  5. MBD -hold sevelamer for now, Phos 3  6. Nutrition - Nepro TID. Cardiac diet. Do not restrict K and phos. Replace K PO  6.  DM 2- management per primary  7. Recurrent R sided pleural effusion-->thoracentesis done 4/18/17      Thank you for consult, will follow  With any question please call [phone]  Eileen Hand    [facility]  BRISEYDA Cordoba MD, JAVON RIVERS MD,   MD JORGE Mendez, RADHA  [address]  [place], [id number] [phone]

## 2017-04-20 NOTE — PHYSICIAN QUERY
PT Name: Terry Cote  MR #: [id number]     Physician Query Form - Documentation Clarification      CDS/: Mica Rios               Contact information:[email or web address]    This form is a permanent document in the medical record.     Query Date: April 20, 2017    By submitting this query, we are merely seeking further clarification of documentation. Please utilize your independent clinical judgment when addressing the question(s) below.    The Medical record reflects the following:    Supporting Clinical Findings Location in Medical Record     Potassium = 3.2    Magnesium = 1.4       Labs 4/19-4/20     Do not restrict K and phos. Replace K PO     Magnesium IV   Nephrology CN 4/19      MAR                                                                            Doctor, Please specify diagnosis or diagnoses associated with above clinical findings.    Provider Use Only      [  x ]  Hypokalemia and Hypomagnesemia    [   ]  Hypokalemia    [   ]  Hypomagnesemia     [   ]  Other explanations with details______________________________________                                                                                                               [  ] Clinically undetermined

## 2017-04-20 NOTE — PLAN OF CARE
04/20/17 0821   Discharge Assessment   Assessment Type Discharge Planning Assessment   Confirmed/corrected address and phone number on facesheet? Yes   Expected Length of Stay (days) 2   Communicated expected length of stay with patient/caregiver yes   If Healthcare Directive is received, is it scanned into Epic? yes   Prior to hospitilization cognitive status: Alert/Oriented   Prior to hospitalization functional status: Assistive Equipment;Needs Assistance   Current cognitive status: Alert/Oriented   Current Functional Status: Assistive Equipment;Needs Assistance   Arrived From home or self-care   Lives With spouse   Able to Return to Prior Arrangements yes   Is patient able to care for self after discharge? Yes   How many people do you have in your home that can help with your care after discharge? 1   Who are your caregiver(s) and their phone number(s)? BaljinderlianneLove Spouse [phone]      Patient's perception of discharge disposition home health   Readmission Within The Last 30 Days no previous admission in last 30 days   Patient currently being followed by outpatient case management? No   Patient currently receives home health services? Yes   Patient previously received home health services and would like to resume services if necessary? Yes   Does the patient currently use HME? Yes   Patient currently receives private duty nursing? No   Equipment Currently Used at Home rollator;walker, standard;wheelchair;shower chair;bedside commode;raised toilet;cane, straight   Do you have any problems affording any of your prescribed medications? No   Is the patient taking medications as prescribed? yes   Do you have any financial concerns preventing you from receiving the healthcare you need? No   Does the patient have transportation to healthcare appointments? Yes   Transportation Available family or friend will provide   On Dialysis? Yes   If yes, what is the name of the dialysis unit? [facility]   Does the  patient receive outpatient dialysis? No   Does the patient receive services at the Coumadin Clinic? No   Discharge Plan A Home with family;Home Health   Discharge Plan B Skilled Nursing Facility

## 2017-04-21 ENCOUNTER — ANESTHESIA EVENT (OUTPATIENT)
Dept: SURGERY | Facility: HOSPITAL | Age: 67
DRG: 981 | End: 2017-04-21
Payer: MEDICARE

## 2017-04-21 ENCOUNTER — ANESTHESIA (OUTPATIENT)
Dept: SURGERY | Facility: HOSPITAL | Age: 67
DRG: 981 | End: 2017-04-21
Payer: MEDICARE

## 2017-04-21 VITALS
HEART RATE: 102 BPM | HEIGHT: 71 IN | OXYGEN SATURATION: 96 % | SYSTOLIC BLOOD PRESSURE: 124 MMHG | RESPIRATION RATE: 16 BRPM | DIASTOLIC BLOOD PRESSURE: 67 MMHG | TEMPERATURE: 98 F | WEIGHT: 181.69 LBS | BODY MASS INDEX: 25.44 KG/M2

## 2017-04-21 LAB
ALBUMIN SERPL BCP-MCNC: 2.1 G/DL
ALP SERPL-CCNC: 74 U/L
ALT SERPL W/O P-5'-P-CCNC: 53 U/L
ANION GAP SERPL CALC-SCNC: 8 MMOL/L
AST SERPL-CCNC: 29 U/L
BACTERIA UR CULT: NORMAL
BASOPHILS # BLD AUTO: 0.03 K/UL
BASOPHILS NFR BLD: 0.5 %
BILIRUB SERPL-MCNC: 0.4 MG/DL
BUN SERPL-MCNC: 30 MG/DL
CALCIUM SERPL-MCNC: 8.4 MG/DL
CHLORIDE SERPL-SCNC: 99 MMOL/L
CO2 SERPL-SCNC: 29 MMOL/L
CREAT SERPL-MCNC: 5.7 MG/DL
DIFFERENTIAL METHOD: ABNORMAL
EOSINOPHIL # BLD AUTO: 0.4 K/UL
EOSINOPHIL NFR BLD: 6.7 %
ERYTHROCYTE [DISTWIDTH] IN BLOOD BY AUTOMATED COUNT: 14.9 %
EST. GFR  (AFRICAN AMERICAN): 11 ML/MIN/1.73 M^2
EST. GFR  (NON AFRICAN AMERICAN): 9 ML/MIN/1.73 M^2
GLUCOSE SERPL-MCNC: 201 MG/DL
GRAM STN SPEC: NORMAL
GRAM STN SPEC: NORMAL
HAV IGM SERPL QL IA: NEGATIVE
HBV CORE IGM SERPL QL IA: NEGATIVE
HBV SURFACE AG SERPL QL IA: NEGATIVE
HCT VFR BLD AUTO: 30 %
HCV AB SERPL QL IA: NEGATIVE
HGB BLD-MCNC: 9.9 G/DL
LYMPHOCYTES # BLD AUTO: 1 K/UL
LYMPHOCYTES NFR BLD: 18.1 %
MCH RBC QN AUTO: 26.9 PG
MCHC RBC AUTO-ENTMCNC: 33 %
MCV RBC AUTO: 82 FL
MONOCYTES # BLD AUTO: 0.3 K/UL
MONOCYTES NFR BLD: 6 %
NEUTROPHILS # BLD AUTO: 3.9 K/UL
NEUTROPHILS NFR BLD: 68.5 %
PLATELET # BLD AUTO: 165 K/UL
PMV BLD AUTO: 9.7 FL
POCT GLUCOSE: 142 MG/DL (ref 70–110)
POCT GLUCOSE: 194 MG/DL (ref 70–110)
POTASSIUM SERPL-SCNC: 3.8 MMOL/L
PROCALCITONIN SERPL IA-MCNC: 0.15 NG/ML
PROT SERPL-MCNC: 6.5 G/DL
RBC # BLD AUTO: 3.68 M/UL
SODIUM SERPL-SCNC: 136 MMOL/L
VANCOMYCIN SERPL-MCNC: 26.9 UG/ML
WBC # BLD AUTO: 5.68 K/UL

## 2017-04-21 PROCEDURE — 87070 CULTURE OTHR SPECIMN AEROBIC: CPT

## 2017-04-21 PROCEDURE — 36000704 HC OR TIME LEV I 1ST 15 MIN: Performed by: SURGERY

## 2017-04-21 PROCEDURE — 80202 ASSAY OF VANCOMYCIN: CPT

## 2017-04-21 PROCEDURE — 87075 CULTR BACTERIA EXCEPT BLOOD: CPT

## 2017-04-21 PROCEDURE — 63600175 PHARM REV CODE 636 W HCPCS

## 2017-04-21 PROCEDURE — 71000039 HC RECOVERY, EACH ADD'L HOUR: Performed by: SURGERY

## 2017-04-21 PROCEDURE — 25000003 PHARM REV CODE 250: Performed by: NURSE ANESTHETIST, CERTIFIED REGISTERED

## 2017-04-21 PROCEDURE — 88300 SURGICAL PATH GROSS: CPT | Performed by: PATHOLOGY

## 2017-04-21 PROCEDURE — 85025 COMPLETE CBC W/AUTO DIFF WBC: CPT

## 2017-04-21 PROCEDURE — 87205 SMEAR GRAM STAIN: CPT

## 2017-04-21 PROCEDURE — 88300 SURGICAL PATH GROSS: CPT | Mod: 26,,, | Performed by: PATHOLOGY

## 2017-04-21 PROCEDURE — 37000008 HC ANESTHESIA 1ST 15 MINUTES: Performed by: SURGERY

## 2017-04-21 PROCEDURE — 80100016 HC MAINTENANCE HEMODIALYSIS

## 2017-04-21 PROCEDURE — 80053 COMPREHEN METABOLIC PANEL: CPT

## 2017-04-21 PROCEDURE — 37000009 HC ANESTHESIA EA ADD 15 MINS: Performed by: SURGERY

## 2017-04-21 PROCEDURE — 63600175 PHARM REV CODE 636 W HCPCS: Performed by: NURSE ANESTHETIST, CERTIFIED REGISTERED

## 2017-04-21 PROCEDURE — 71000033 HC RECOVERY, INTIAL HOUR: Performed by: SURGERY

## 2017-04-21 PROCEDURE — 36415 COLL VENOUS BLD VENIPUNCTURE: CPT

## 2017-04-21 PROCEDURE — 87118 MYCOBACTERIC IDENTIFICATION: CPT

## 2017-04-21 PROCEDURE — 0WPG03Z REMOVAL OF INFUSION DEVICE FROM PERITONEAL CAVITY, OPEN APPROACH: ICD-10-PCS | Performed by: SURGERY

## 2017-04-21 PROCEDURE — 36000705 HC OR TIME LEV I EA ADD 15 MIN: Performed by: SURGERY

## 2017-04-21 PROCEDURE — 25000003 PHARM REV CODE 250: Performed by: SURGERY

## 2017-04-21 PROCEDURE — 63600175 PHARM REV CODE 636 W HCPCS: Performed by: ANESTHESIOLOGY

## 2017-04-21 RX ORDER — ACETAMINOPHEN 10 MG/ML
1000 INJECTION, SOLUTION INTRAVENOUS ONCE
Status: COMPLETED | OUTPATIENT
Start: 2017-04-21 | End: 2017-04-21

## 2017-04-21 RX ORDER — DIPHENHYDRAMINE HYDROCHLORIDE 50 MG/ML
25 INJECTION INTRAMUSCULAR; INTRAVENOUS EVERY 6 HOURS PRN
Status: DISCONTINUED | OUTPATIENT
Start: 2017-04-21 | End: 2017-04-21 | Stop reason: HOSPADM

## 2017-04-21 RX ORDER — OXYCODONE HYDROCHLORIDE 5 MG/1
5 TABLET ORAL
Status: DISCONTINUED | OUTPATIENT
Start: 2017-04-21 | End: 2017-04-21 | Stop reason: HOSPADM

## 2017-04-21 RX ORDER — SODIUM CHLORIDE 0.9 % (FLUSH) 0.9 %
3 SYRINGE (ML) INJECTION
Status: DISCONTINUED | OUTPATIENT
Start: 2017-04-21 | End: 2017-04-21 | Stop reason: HOSPADM

## 2017-04-21 RX ORDER — SODIUM CHLORIDE 9 MG/ML
INJECTION, SOLUTION INTRAVENOUS CONTINUOUS
Status: DISCONTINUED | OUTPATIENT
Start: 2017-04-21 | End: 2017-04-21

## 2017-04-21 RX ORDER — PROPOFOL 10 MG/ML
VIAL (ML) INTRAVENOUS CONTINUOUS PRN
Status: DISCONTINUED | OUTPATIENT
Start: 2017-04-21 | End: 2017-04-21

## 2017-04-21 RX ORDER — HYDROMORPHONE HYDROCHLORIDE 2 MG/ML
INJECTION, SOLUTION INTRAMUSCULAR; INTRAVENOUS; SUBCUTANEOUS
Status: COMPLETED
Start: 2017-04-21 | End: 2017-04-21

## 2017-04-21 RX ORDER — PROPOFOL 10 MG/ML
VIAL (ML) INTRAVENOUS
Status: DISCONTINUED | OUTPATIENT
Start: 2017-04-21 | End: 2017-04-21

## 2017-04-21 RX ORDER — HYDROMORPHONE HYDROCHLORIDE 2 MG/ML
0.5 INJECTION, SOLUTION INTRAMUSCULAR; INTRAVENOUS; SUBCUTANEOUS EVERY 5 MIN PRN
Status: ACTIVE | OUTPATIENT
Start: 2017-04-21 | End: 2017-04-21

## 2017-04-21 RX ORDER — SODIUM CHLORIDE 9 MG/ML
INJECTION, SOLUTION INTRAVENOUS CONTINUOUS PRN
Status: DISCONTINUED | OUTPATIENT
Start: 2017-04-21 | End: 2017-04-21

## 2017-04-21 RX ORDER — LIDOCAINE HYDROCHLORIDE 10 MG/ML
INJECTION, SOLUTION EPIDURAL; INFILTRATION; INTRACAUDAL; PERINEURAL
Status: DISCONTINUED | OUTPATIENT
Start: 2017-04-21 | End: 2017-04-21 | Stop reason: HOSPADM

## 2017-04-21 RX ORDER — CEFAZOLIN SODIUM 1 G/3ML
INJECTION, POWDER, FOR SOLUTION INTRAMUSCULAR; INTRAVENOUS
Status: DISCONTINUED | OUTPATIENT
Start: 2017-04-21 | End: 2017-04-21

## 2017-04-21 RX ORDER — BACITRACIN 50000 [IU]/1
INJECTION, POWDER, FOR SOLUTION INTRAMUSCULAR
Status: DISCONTINUED | OUTPATIENT
Start: 2017-04-21 | End: 2017-04-21 | Stop reason: HOSPADM

## 2017-04-21 RX ORDER — LIDOCAINE HCL/PF 100 MG/5ML
SYRINGE (ML) INTRAVENOUS
Status: DISCONTINUED | OUTPATIENT
Start: 2017-04-21 | End: 2017-04-21

## 2017-04-21 RX ORDER — ONDANSETRON 2 MG/ML
4 INJECTION INTRAMUSCULAR; INTRAVENOUS DAILY PRN
Status: DISCONTINUED | OUTPATIENT
Start: 2017-04-21 | End: 2017-04-21 | Stop reason: HOSPADM

## 2017-04-21 RX ADMIN — HYDROMORPHONE HYDROCHLORIDE 0.5 MG: 2 INJECTION INTRAMUSCULAR; INTRAVENOUS; SUBCUTANEOUS at 09:04

## 2017-04-21 RX ADMIN — CEFAZOLIN 2 G: 330 INJECTION, POWDER, FOR SOLUTION INTRAMUSCULAR; INTRAVENOUS at 08:04

## 2017-04-21 RX ADMIN — LIDOCAINE HYDROCHLORIDE 100 MG: 20 INJECTION, SOLUTION INTRAVENOUS at 08:04

## 2017-04-21 RX ADMIN — PROPOFOL 30 MG: 10 INJECTION, EMULSION INTRAVENOUS at 08:04

## 2017-04-21 RX ADMIN — SODIUM CHLORIDE: 0.9 INJECTION, SOLUTION INTRAVENOUS at 08:04

## 2017-04-21 RX ADMIN — PROPOFOL 100 MCG/KG/MIN: 10 INJECTION, EMULSION INTRAVENOUS at 08:04

## 2017-04-21 RX ADMIN — ACETAMINOPHEN 1000 MG: 10 INJECTION, SOLUTION INTRAVENOUS at 09:04

## 2017-04-21 NOTE — ASSESSMENT & PLAN NOTE
Terry Cote is a 66 yo AAM with PMH of CAD, HFpEF, CVA, DM2 with nephropathy, ESRD on PD, HTN, hyperparathyroidism, arthritis who was admitted to [facility] on 4/19 for PD cath site infection for which he is currently on Vanc/Ceftaz/Fluconazole. PD cath removed 4/21 in addition to drainage of adjacent SQ abscess. Blood/peritoneal fluid/wound/urine cultures thus far NGTD. Repeat pro-calcitonin greatly improved indicating good response to therapy.     1) Continue Vanc/Ceftaz, transition to IV with HD now that PD cath removed  2) If patient worsens, would transition Vanc to Linezolid based on concerning UA and h/o VRE  3) Agree with addition of Fluconazole per Renal, which is indicated as prevention for secondary fungal infection in patient with PD associated infection  4) Would treat for 7-days in total.

## 2017-04-21 NOTE — PROGRESS NOTES
SSC assisting [place] with outpatient HD setup.    H&P, Chest Xray, Face Sheet, Labs, Nephrology notes sent to [facility] Admissions fax [phone] [facility].

## 2017-04-21 NOTE — PLAN OF CARE
Patient given appt of 4/24/17 at [facility] to come in for HD treatment, TN awaiting  orders to send to [facility].  Follow-up With  Details  Why  Contact Cristina   [facility] NúñezPROFICIO  On 4/24/2017  @3:30pm  [address]  [place] [address]  [phone]     [facility]  Call on 5/4/2017  @9:30am for lab work then in clinic with Dr. Church for 10am  [address]  [facility] [id number] [phone]   [facility] [place] [facility]    Home Health  [address]  [address]  [place] [address]  [phone]               04/21/17 1424   Final Note   Assessment Type Final Discharge Note   Discharge Disposition Home   Discharge planning education complete? Yes   What phone number can be called within the next 1-3 days to see how you are doing after discharge? [phone]   Hospital Follow Up  Appt(s) scheduled? Yes   Discharge plans and expectations educations in teach back method with documentation complete? Yes   Offered [facility] -- Bedside Delivery? Yes   Discharge/Hospital Encounter Summary to (non-[facility]) PCP n/a   Referral to Outpatient Case Management complete? No   Referral to / orders for Home Health Complete? Yes   30 day supply of medicines given at discharge, if documented non-compliance / non-adherence? n/a   Any social issues identified prior to discharge? No   Did you assess the readiness or willingness of the family or caregiver to support self management of care? Yes   Right Care Referral Info   Post Acute Recommendation Home-care

## 2017-04-21 NOTE — PROGRESS NOTES
TN sent  REferral (resume) orders to [facility], fax, [phone], phone .  [facility] Gastelum RN  Transitional Navigator/Case Management  [phone]

## 2017-04-21 NOTE — ANESTHESIA PREPROCEDURE EVALUATION
04/21/2017  Terry Cote is a 67 y.o., male for removal of peritoneal dialysis catheter    Review of patient's allergies indicates:  No Known Allergies    Past Medical History:   Diagnosis Date    Anemia in chronic kidney disease     Arthritis     unable to walk due to arthritis in knees.     Coronary artery disease     Diabetes mellitus     Diabetes mellitus, type 2     Diabetic nephropathy 4/22/2015    Diastolic dysfunction     Hypertension     Myocardial infarction 2001    Peritoneal dialysis catheter in place 2014    Secondary hyperparathyroidism     Stroke 2001, 2013, 10/2014    weak, slurred speech, equal hand grasp/equal leg movements    Type 2 diabetes mellitus for many yrs about 25 yrs 4/22/2015    Unspecified disorder of kidney and ureter      Past Surgical History:   Procedure Laterality Date    AV FISTULA PLACEMENT Left 2011    wrist;    CATARACT EXTRACTION      NECK SURGERY      PERITONEAL CATHETER INSERTION      PORTACATH PLACEMENT  march 2016    left chest    SPINE SURGERY       Patient Active Problem List   Diagnosis    Unspecified transient cerebral ischemia    CHF with left ventricular diastolic dysfunction, NYHA class 1    ESRD (end stage renal disease) on dialysis    Diabetes mellitus, type 2    Dyslipidemia associated with type 2 diabetes mellitus    Hypertension associated with diabetes    Hyperuricemia    ESRD on dialysis    Organ transplant candidate    Pre-transplant evaluation for chronic kidney disease    Type 2 diabetes mellitus for many yrs about 25 yrs    Diabetic nephropathy    Secondary hyperparathyroidism    Anemia in chronic kidney disease    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    Neuropathy    DM type 2 causing renal disease    ESRD on peritoneal dialysis    Stroke    (HFpEF) heart failure with preserved  ejection fraction    Elevated PSA    Transient alteration of awareness    Type 2 diabetes mellitus with diabetic chronic kidney disease    Vitamin D deficiency    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    Hand muscle atrophy    Cerebral infarct    Pharyngoesophageal dysphagia    Essential hypertension    Coronary artery disease involving native coronary artery of native heart without angina pectoris    Anemia of chronic disease    Syncope    Urinary tract infection without hematuria    Dysphagia    Pleural effusion    Pleural effusion, right    Hyperlipidemia    History of stroke    Right-sided chest pain    Malnutrition    Peritoneal dialysis catheter site infection    Cutaneous abscess of abdominal wall         Anesthesia Evaluation         Review of Systems    Wt Readings from Last 3 Encounters:   04/21/17 82.4 kg (181 lb 10.5 oz)   04/17/17 85.7 kg (189 lb)   02/21/17 81.2 kg (179 lb)     Temp Readings from Last 3 Encounters:   04/21/17 36.9 °C (98.5 °F) (Oral)   04/17/17 36.5 °C (97.7 °F)   04/13/17 36.2 °C (97.1 °F)     BP Readings from Last 3 Encounters:   04/21/17 (!) 141/65   04/17/17 135/72   04/13/17 119/69     Pulse Readings from Last 3 Encounters:   04/21/17 101   04/17/17 100   04/13/17 110     Lab Results   Component Value Date    WBC 5.68 04/21/2017    HGB 9.9 (L) 04/21/2017    HCT 30.0 (L) 04/21/2017    MCV 82 04/21/2017     04/21/2017         Physical Exam  General:  Well nourished    Airway/Jaw/Neck:  Airway Findings: Mouth Opening: Normal Tongue: Normal  General Airway Assessment: Adult  Mallampati: II  Improves to II with phonation.  TM Distance: Normal, at least 6 cm       Chest/Lungs:  Chest/Lungs Findings: Clear to auscultation, Normal Respiratory Rate         Mental Status:  Mental Status Findings:  Cooperative, Alert and Oriented         Anesthesia Plan  Type of Anesthesia, risks & benefits discussed:  Anesthesia Type:  MAC  Patient's Preference:    Intra-op Monitoring Plan:   Intra-op Monitoring Plan Comments:   Post Op Pain Control Plan:   Post Op Pain Control Plan Comments:   Induction:   IV  Beta Blocker:         Informed Consent: Patient understands risks and agrees with Anesthesia plan.  Questions answered. Anesthesia consent signed with patient.  ASA Score: 4     Day of Surgery Review of History & Physical: I have interviewed and examined the patient. I have reviewed the patient's H&P dated:  There are no significant changes.  H&P update referred to the provider.  H&P completed by Anesthesiologist.       Ready For Surgery From Anesthesia Perspective.

## 2017-04-21 NOTE — PROGRESS NOTES
[facility] placed call to [facility] [phone], spoke with Darline Oh (Charge nurse) regarding patient. She confirmed that Mr. Cote was their patient and  receives PD at that facility. She assured SSC  it will not be a problem for him to report on Monday 4/24/2017 for HD because he was already receiving treatment.  SSC informed her that patient's information was sent to [facility] Admissions this morning. Darline informed SSC that patient can report on Monday for 3:30pm.    FRED Floyd informed of above.

## 2017-04-21 NOTE — PROGRESS NOTES
Progress Note  Nephrology      Consult Requested By: Sg Bradley MD      SUBJECTIVE:     Overnight events  Patient is a 67 y.o. male     Patient Active Problem List   Diagnosis    Unspecified transient cerebral ischemia    CHF with left ventricular diastolic dysfunction, NYHA class 1    ESRD (end stage renal disease) on dialysis    Diabetes mellitus, type 2    Dyslipidemia associated with type 2 diabetes mellitus    Hypertension associated with diabetes    Hyperuricemia    ESRD on dialysis    Organ transplant candidate    Pre-transplant evaluation for chronic kidney disease    Type 2 diabetes mellitus for many yrs about 25 yrs    Diabetic nephropathy    Secondary hyperparathyroidism    Anemia in chronic kidney disease    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    Neuropathy    DM type 2 causing renal disease    ESRD on peritoneal dialysis    Stroke    (HFpEF) heart failure with preserved ejection fraction    Elevated PSA    Transient alteration of awareness    Type 2 diabetes mellitus with diabetic chronic kidney disease    Vitamin D deficiency    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    Hand muscle atrophy    Cerebral infarct    Pharyngoesophageal dysphagia    Essential hypertension    Coronary artery disease involving native coronary artery of native heart without angina pectoris    Anemia of chronic disease    Syncope    Urinary tract infection without hematuria    Dysphagia    Pleural effusion    Pleural effusion, right    Hyperlipidemia    History of stroke    Right-sided chest pain    Malnutrition    Peritoneal dialysis catheter site infection    Cutaneous abscess of abdominal wall     Past Medical History:   Diagnosis Date    Anemia in chronic kidney disease     Arthritis     unable to walk due to arthritis in knees.     Coronary artery disease     Diabetes mellitus     Diabetes mellitus, type 2     Diabetic nephropathy  4/22/2015    Diastolic dysfunction     Hypertension     Myocardial infarction 2001    Peritoneal dialysis catheter in place 2014    Secondary hyperparathyroidism     Stroke 2001, 2013, 10/2014    weak, slurred speech, equal hand grasp/equal leg movements    Type 2 diabetes mellitus for many yrs about 25 yrs 4/22/2015    Unspecified disorder of kidney and ureter               OBJECTIVE:     Vitals:    04/21/17 1000 04/21/17 1015 04/21/17 1030 04/21/17 1156   BP: 106/68 116/72 (!) 152/68    BP Location:       Patient Position:       BP Method:       Pulse: 62 66 92    Resp: (!) 7 (!) 7 14    Temp:   97.9 °F (36.6 °C)    TempSrc:       SpO2: (!) 93% 97% 96% 96%   Weight:       Height:           Temp: 97.9 °F (36.6 °C) (04/21/17 1030)  Pulse: 92 (04/21/17 1030)  Resp: 14 (04/21/17 1030)  BP: (!) 152/68 (04/21/17 1030)  SpO2: 96 % (04/21/17 1156)      Date 04/21/17 0700 - 04/22/17 0659   Shift 7180-7791 4660-1841 7220-1321 24 Hour Total   I  N  T  A  K  E   I.V.  (mL/kg) 100  (1.2)   100  (1.2)    Shift Total  (mL/kg) 100  (1.2)   100  (1.2)   O  U  T  P  U  T   Shift Total  (mL/kg)       Weight (kg) 82.4 82.4 82.4 82.4             Medications:   allopurinol  100 mg Oral Daily    aspirin  81 mg Oral Daily    ceftAZIDime (FORTAZ) IVPB  1 g Intravenous Q12H    clopidogrel  75 mg Oral Daily    fluconazole (DIFLUCAN) IVPB  100 mg Intravenous Q24H    gabapentin  300 mg Oral QHS    insulin detemir  20 Units Subcutaneous QHS    lisinopril  10 mg Oral Daily    polyethylene glycol  17 g Oral Daily    pravastatin  40 mg Oral Daily    senna-docusate 8.6-50 mg  1 tablet Oral BID    [START ON 4/22/2017] vancomycin (VANCOCIN) IVPB  1,000 mg Intravenous Q24H    vitamin renal formula (B-complex-vitamin c-folic acid)  1 capsule Oral Daily      sodium chloride 0.9%         Physical Exam:  General appearance: NAD  No SOB, no cough, no CP, no abdominal pain  Lungs: diminished breath sounds  Heart: Pulse 92  Abdomen:  soft  Extremities: edemat  Skin: dry  Laboratory:  ABG  Labs reviewed  No results found for this or any previous visit (from the past 336 hour(s)).  Recent Results (from the past 336 hour(s))   CBC with Automated Differential    Collection Time: 04/21/17  4:51 AM   Result Value Ref Range    WBC 5.68 3.90 - 12.70 K/uL    Hemoglobin 9.9 (L) 14.0 - 18.0 g/dL    Hematocrit 30.0 (L) 40.0 - 54.0 %    Platelets 165 150 - 350 K/uL   CBC with Automated Differential    Collection Time: 04/20/17  4:05 AM   Result Value Ref Range    WBC 7.08 3.90 - 12.70 K/uL    Hemoglobin 10.9 (L) 14.0 - 18.0 g/dL    Hematocrit 33.1 (L) 40.0 - 54.0 %    Platelets 149 (L) 150 - 350 K/uL   CBC auto differential    Collection Time: 04/19/17 11:20 AM   Result Value Ref Range    WBC 10.36 3.90 - 12.70 K/uL    Hemoglobin 12.4 (L) 14.0 - 18.0 g/dL    Hematocrit 36.8 (L) 40.0 - 54.0 %    Platelets 200 150 - 350 K/uL     Urinalysis  No results for input(s): COLORU, CLARITYU, SPECGRAV, PHUR, PROTEINUA, GLUCOSEU, BILIRUBINCON, BLOODU, WBCU, RBCU, BACTERIA, MUCUS, NITRITE, LEUKOCYTESUR, UROBILINOGEN, HYALINECASTS in the last 24 hours.    Diagnostic Results:  X-Ray: Reviewed  US: Reviewed  Echo: Reviewed  ACCESS    ASSESSMENT/PLAN:     ESRD.  MBD.  Anemia.  Poor nutrition.  /68.  Cultures in progress.  On fortaz 1 g bid.  Check vancomycin level in am.

## 2017-04-21 NOTE — PROGRESS NOTES
Per FRED Torres's request, TN faxed final progress notes to [facility]  Fax [phone], when pt finished dialysis and discharged home

## 2017-04-21 NOTE — SUBJECTIVE & OBJECTIVE
Review of Systems   Reason unable to perform ROS: patient in OR.   Too sleep post-op after re-attempt to see patient.    Objective:     Vital Signs (Most Recent):  Temp: 97.9 °F (36.6 °C) (04/21/17 1030)  Pulse: 92 (04/21/17 1030)  Resp: 14 (04/21/17 1030)  BP: (!) 152/68 (04/21/17 1030)  SpO2: 96 % (04/21/17 1156) Vital Signs (24h Range):  Temp:  [96.8 °F (36 °C)-98.7 °F (37.1 °C)] 97.9 °F (36.6 °C)  Pulse:  [] 92  Resp:  [7-21] 14  SpO2:  [93 %-100 %] 96 %  BP: (104-152)/(51-77) 152/68     Weight: 82.4 kg (181 lb 10.5 oz)  Body mass index is 25.34 kg/(m^2).    Estimated Creatinine Clearance: 13.2 mL/min (based on Cr of 5.7).    Physical Exam   Constitutional:   Patient in OR and unavailable for examination   Nursing note and vitals reviewed.  Sleeping, RRR, no murmurs, diminished R-sided breath sounds unchanged, prev PD cath site dressing in place    Significant Labs:   CBC:     Recent Labs  Lab 04/20/17  0405 04/21/17  0451   WBC 7.08 5.68   HGB 10.9* 9.9*   HCT 33.1* 30.0*   * 165     CMP:     Recent Labs  Lab 04/20/17  0405 04/21/17  0451    136   K 3.2* 3.8    99   CO2 24 29   * 201*   BUN 34* 30*   CREATININE 6.2* 5.7*   CALCIUM 8.7 8.4*   PROT 6.7 6.5   ALBUMIN 2.1* 2.1*   BILITOT 0.5 0.4   ALKPHOS 74 74   AST 29 29   ALT 54* 53*   ANIONGAP 11 8   EGFRNONAA 8* 9*     Procalcitonin: 12.46 --> 0.15    Microbiology:  4/21: Abdominal Body Fluid Cx: rare WBC, no organisms  4/21: Abdominal Body Fluid (aerobic) cx: collected  4/21: Abdominal Body Fluid (anaerobic) cx: collected  4/19: Blood cultures x 2: NGTD  4/19: Blood cultures x 2: NGTD  4/19: Urine culture (clean catch): NGTD  4/19: Peritoneal Fluid (gram stain): NGTD  4/19: Peritoneal Fluid (aerobic) x 2: NGTD  4/19: Wound culture (aerobic): NGTD  4/19: Wound culture (anaerobic): in process    4/20: Hepatitis panel: negative     History:  11/2016: Urine Cx - Klebsiella (pansensitive)  03/2016: Blood Cx - MRSA (sensitive to  Vanc)  11/2015: Urine Cx - VRE (sensitive to Linezolid)  08/2015: Urine Cx - Pseudomonas (pansensitive)    Significant Imaging: I have reviewed all pertinent imaging results/findings within the past 24 hours.

## 2017-04-21 NOTE — PROGRESS NOTES
[facility] Resident HO-1 Progress Note    Subjective:      Terry Cote is a 67 y.o.  male who is being followed by the [facility] service at [facility] for PD line infection.     Patient had procedure with Dr. Flynn this am. No acute issues.      Objective:   Last 24 Hour Vital Signs:  BP  Min: 98/82  Max: 144/60  Temp  Av.2 °F (36.8 °C)  Min: 97.6 °F (36.4 °C)  Max: 98.7 °F (37.1 °C)  Pulse  Av.3  Min: 93  Max: 107  Resp  Av.7  Min: 18  Max: 21  SpO2  Av %  Min: 100 %  Max: 100 %  Weight  Av.4 kg (181 lb 10.5 oz)  Min: 82.4 kg (181 lb 10.5 oz)  Max: 82.4 kg (181 lb 10.5 oz)  I/O last 3 completed shifts:  In: 900 [P.O.:500; Other:400]  Out: 800 [Urine:400; Other:400]    Physical Examination:  General Appearance:    Alert, cooperative, NAD, appears stated age   Head:    Normocephalic, without obvious abnormality, atraumatic, MMM   Eyes:    PERRL, conjunctiva/corneas clear, EOM's intact grossly   Neck:   Supple, symmetrical, trachea midline, no adenopathy;       Lungs:     Clear to auscultation bilaterally, respirations unlabored   Chest Wall:    No tenderness or deformity    Heart:    Regular rate and rhythm, S1 and S2 normal, no murmur, rub      Abdomen:     Soft, non-tender, intact clean bandage    Extremities:   Extremities normal, atraumatic, no cyanosis or edema   Skin:   Skin color, texture; no rashes or lesions on exposed skin   Neurologic:   A&O, moving all four extremities, slowed speech        Laboratory:  Laboratory Data Reviewed: yes  Pertinent Findings:    Recent Labs  Lab 17  1120 17  0405 17  0451   WBC 10.36 7.08 5.68   HGB 12.4* 10.9* 9.9*   HCT 36.8* 33.1* 30.0*    149* 165   MCV 83 82 82   RDW 15.1* 15.2* 14.9*    137 136   K 3.3* 3.2* 3.8    102 99   CO2 25 24 29   BUN 34* 34* 30*   CREATININE 6.5* 6.2* 5.7*   GLU 84 141* 201*   PROT 8.5* 6.7 6.5   ALBUMIN 2.6* 2.1* 2.1*   BILITOT 0.5 0.5 0.4   AST 32 29 29    ALKPHOS 95 74 74   ALT 53* 54* 53*         Microbiology Data Reviewed: yes  Pertinent Findings:  Blood culture 4/19: NGTD  Peritoneal fluid culture NGTD    Radiology Data Reviewed: yes  Pertinent Findings:  US of abdomen: collection around catheter    Current Medications:     Infusions:   sodium chloride 0.9%          Scheduled:   allopurinol  100 mg Oral Daily    aspirin  81 mg Oral Daily    ceftAZIDime (FORTAZ) IVPB  1 g Intravenous Q12H    clopidogrel  75 mg Oral Daily    fluconazole (DIFLUCAN) IVPB  100 mg Intravenous Q24H    gabapentin  300 mg Oral QHS    insulin detemir  20 Units Subcutaneous QHS    lisinopril  10 mg Oral Daily    polyethylene glycol  17 g Oral Daily    pravastatin  40 mg Oral Daily    senna-docusate 8.6-50 mg  1 tablet Oral BID    vancomycin (VANCOCIN) IVPB  1,000 mg Intravenous Q24H    vitamin renal formula (B-complex-vitamin c-folic acid)  1 capsule Oral Daily        PRN:  acetaminophen, bacitracin, dextrose 50%, dextrose 50%, docusate sodium, glucagon (human recombinant), glucose, glucose, insulin aspart, ondansetron    Antibiotics and Day Number of Therapy:  Ceftazidime PD 4/19  Ceftazidime IV 4/20-  Vanc 4/20    Lines and Day Number of Therapy:  PIV    Assessment:     Terry Cote is a 67 y.o.male with  Patient Active Problem List    Diagnosis Date Noted    Cutaneous abscess of abdominal wall     Malnutrition 04/19/2017    Peritoneal dialysis catheter site infection 04/19/2017    Pleural effusion, right 02/12/2017    Hyperlipidemia 02/12/2017    History of stroke 02/12/2017    Right-sided chest pain 02/12/2017    Pleural effusion 09/28/2016    Dysphagia 07/15/2016    Syncope 06/07/2016    Urinary tract infection without hematuria 06/07/2016    Anemia of chronic disease 03/15/2016    Essential hypertension 01/03/2016    Coronary artery disease involving native coronary artery of native heart without angina pectoris 01/03/2016    Pharyngoesophageal  dysphagia 12/08/2015    Cerebral infarct     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 08/25/2015    Hand muscle atrophy 08/25/2015    Vitamin D deficiency 08/22/2015    Type 2 diabetes mellitus with diabetic chronic kidney disease 08/21/2015    (HFpEF) heart failure with preserved ejection fraction 08/19/2015    Elevated PSA 08/19/2015    Transient alteration of awareness 08/19/2015    Neuropathy 07/23/2015    DM type 2 causing renal disease 07/23/2015    ESRD on peritoneal dialysis 07/23/2015    Stroke     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 05/14/2015    Organ transplant candidate 04/22/2015    Pre-transplant evaluation for chronic kidney disease 04/22/2015    Type 2 diabetes mellitus for many yrs about 25 yrs 04/22/2015    Diabetic nephropathy 04/22/2015    Secondary hyperparathyroidism     Anemia in chronic kidney disease     ESRD on dialysis 03/20/2015    Diabetes mellitus, type 2 01/23/2015    Dyslipidemia associated with type 2 diabetes mellitus 01/23/2015    Hypertension associated with diabetes 01/23/2015    Hyperuricemia 01/23/2015    ESRD (end stage renal disease) on dialysis 01/16/2015     Class: Acute    CHF with left ventricular diastolic dysfunction, NYHA class 1 12/19/2014    Unspecified transient cerebral ischemia 12/18/2014        Plan:     Peritoneal catheter infection  - patient sent from [facility] for evaluation  - nephrology on board, appreciate recs  - ceftazidine via PD  - blood and peritoneal cultures- peritoneal fluid benign   - patient remains afebrile without leukocytosis  - US abdomen: fluid collection  - will consult Dr. Flynn, appreciate recs   - Nephology on board- patient to begin HD, PD catheter removed 4/21; vanc and ceftazidine IV started 4/20    UTI  - patient with positive UA  - has had pseudomas and VRE in past  - ID following, appreciate recs  - asymptomatic at this time      ESRD  - Cr at baseline per records  -  Continued PD nightly with abx; discontinued 4/20  - patient began on HD 4/20  - Nephrology consulted, appreciate recs      DM2  - A1c 5.5 2/8/17  - patient taking insulin glargine 20u nightly and aspart 5u with meals  - patients home sugars run in high 100s, will monitor       HTN  - patient hypotensive on admission, antihypertensives inititaly held  - will add back as needed    Hypokalemia  - resolved  - will monitor and replete as needed      Hypomagnesemia   - resolved  - will monitor and replete as needed     HFpEF  - discharged on metoprolol but not on home med list   - echo with normal EF and LV diastolic dysfunction  - will monitor      CAD  - continue ASA, plavix, statin  - no acute issues      H/o Stroke  - continued ASA, plavix, statin       Constipation  - last BM 2 days prior to admission, did not take stool softener for two days  - will continue stool softener and monitor       HLD  - continue home statin       Anemia of Chronic Disease  - worked up 11/2016  - likely 2/2 chronic renal disease  - h/h stable   - will monitor      Diet renal  PPX hep  Dispo pending HD setup    Gabriela Ayers  [facility] HO-1  [facility] Med Service Team B    [facility] Medicine Hospitalist Pager numbers:   [facility] Hospitalist Medicine Team A (Roxane/Aide): [phone]  [facility] Hospitalist Medicine Team B (Mirna/Felipe):  [phone]

## 2017-04-21 NOTE — PLAN OF CARE
Problem: Hemodialysis (Adult)  Goal: Signs and Symptoms of Listed Potential Problems Will be Absent, Minimized or Managed (Hemodialysis)  Signs and symptoms of listed potential problems will be absent, minimized or managed by discharge/transition of care (reference Hemodialysis (Adult) CPG).   Outcome: Ongoing (interventions implemented as appropriate)    04/21/17 1630   Hemodialysis   Problems Assessed (Hemodialysis) electrolyte imbalance;fluid imbalance   Problems Present (Hemodialysis) electrolyte imbalance;fluid imbalance   Hd with uf

## 2017-04-21 NOTE — PLAN OF CARE
"Pt resting quietly and states pain 3/10, "much better"/ VSS, swelling noted left sided abd dressing, was assessed by Dr. Flynn and Smith, dressings x 2 CDI. Spoke to Dr. Ojeda for release, pacu discharge criteria met. Report called to Adeline CRUZ on 4A. Will transport back to room shortly.  "

## 2017-04-21 NOTE — TRANSFER OF CARE
"Anesthesia Transfer of Care Note    Patient: Terry Cote    Procedure(s) Performed: Procedure(s) (LRB):  REMOVAL-CATHETER-PERITONEAL DIALYSIS (Left)    Patient location: PACU    Anesthesia Type: MAC    Transport from OR: Transported from OR on 6-10 L/min O2 by face mask with adequate spontaneous ventilation    Post pain: adequate analgesia    Post assessment: no apparent anesthetic complications and tolerated procedure well    Post vital signs: stable    Level of consciousness: awake    Nausea/Vomiting: no nausea/vomiting    Complications: none          Last vitals:   Visit Vitals    BP (!) 141/65 (BP Location: Right arm, Patient Position: Lying, BP Method: Automatic)    Pulse 101    Temp 36.9 °C (98.5 °F) (Oral)    Resp 20    Ht 5' 11" (1.803 m)    Wt 82.4 kg (181 lb 10.5 oz)    SpO2 100%    BMI 25.34 kg/m2     "

## 2017-04-21 NOTE — ANESTHESIA POSTPROCEDURE EVALUATION
"Anesthesia Post Evaluation    Patient: Terry Cote    Procedure(s) Performed: Procedure(s) (LRB):  REMOVAL-CATHETER-PERITONEAL DIALYSIS (Left)    Final Anesthesia Type: MAC  Patient location during evaluation: PACU  Patient participation: Yes- Able to Participate  Level of consciousness: awake and alert  Post-procedure vital signs: reviewed and stable  Pain management: adequate  Airway patency: patent  PONV status at discharge: No PONV  Anesthetic complications: no      Cardiovascular status: hemodynamically stable  Respiratory status: spontaneous ventilation  Hydration status: euvolemic  Follow-up not needed.        Visit Vitals    BP (!) 141/65 (BP Location: Right arm, Patient Position: Lying, BP Method: Automatic)    Pulse 101    Temp 36 °C (96.8 °F) (Oral)    Resp 20    Ht 5' 11" (1.803 m)    Wt 82.4 kg (181 lb 10.5 oz)    SpO2 100%    BMI 25.34 kg/m2       Pain/Deandre Score: Pain Assessment Performed: Yes (4/21/2017  1:45 AM)  Presence of Pain: denies (4/21/2017  1:45 AM)  Pain Rating Prior to Med Admin: 10 (4/21/2017  9:34 AM)      "

## 2017-04-21 NOTE — OP NOTE
DATE OF PROCEDURE:  04/21/2017    PREOPERATIVE DIAGNOSES:  Infected PD catheter with abdominal wall abscess.    POSTOPERATIVE DIAGNOSES:  Infected PD catheter with abdominal wall abscess.    PROCEDURE:  Removal of PD catheter.    SURGEON:  Gabriela Flynn M.D.    ANESTHESIA:  Xylocaine 1% with IV sedation.    PROCEDURE IN DETAIL:  After satisfactory IV sedation, the patient in supine   position.  The left side of the abdominal wall and the midline were prepped and   draped in normal sterile manner using ChloraPrep.  The area of the cuff portion   of the catheter in the left upper quadrant area was infiltrated using 1%   Xylocaine solution.  Incision was made in the same area, taken down to deep   subcutaneous tissue.  Subcutaneous clamped and bovied.  The pocket of pus was   entered, which was cultured for aerobic and anaerobic.  It was tracking down to   the cuff portion of the catheter, which was with blunt and sharp dissection was    out with its attachment to the fascial area.  Catheter was completely   removed and had only 1 cuffed portion.  Complete removal was performed.    Hemostasis satisfactorily maintained.  The wound was closed using 0 Vicryl for   the fascia.  Skin was closed using interrupted 3-0 Vicryl.  The area of the   catheter exit site was used as a drain exit.  Estimated blood loss was 20 mL.    Specimen removed was PD catheter.  Sterile gauze dressing was applied with   Xeroform.  The patient was sent to Recovery Room in stable condition.      IRINEO  dd: 04/21/2017 09:20:21 (CDT)  td: 04/21/2017 12:44:21 (CDT)  Doc ID   #[id number]  Job ID #[id number]    CC:

## 2017-04-21 NOTE — PROGRESS NOTES
[facility] placed call to [facility] Admissions [phone]. Admit coordinator Fawnino ( ext [phone]) is assigned to the case and [facility] Patient has tentative MWF schedule, 3rd shift pending insurance authorization and acceptance from facility.

## 2017-04-21 NOTE — OP NOTE
Operative Note       Surgery Date: 4/21/2017     Surgeon(s) and Role:     * Gabriela Flynn MD - Primary    Pre-op Diagnosis:  Secondary hyperparathyroidism [N25.81]  ESRD on dialysis [N18.6, Z99.2]  ESRD (end stage renal disease) on dialysis [N18.6, Z99.2]  Hypertension associated with diabetes [E11.59, I10]  Dyslipidemia associated with type 2 diabetes mellitus [E11.69, E78.5]  Vitamin D deficiency [E55.9]  Peritoneal dialysis catheter site infection, initial encounter [T85.71XA]  Type 2 diabetes mellitus with chronic kidney disease on chronic dialysis, with long-term current use of insulin [E11.22, N18.6, Z99.2, Z79.4]    Post-op Diagnosis: Post-Op Diagnosis Codes:     * Secondary hyperparathyroidism [N25.81]     * ESRD on dialysis [N18.6, Z99.2]     * ESRD (end stage renal disease) on dialysis [N18.6, Z99.2]     * Hypertension associated with diabetes [E11.59, I10]     * Dyslipidemia associated with type 2 diabetes mellitus [E11.69, E78.5]     * Vitamin D deficiency [E55.9]     * Peritoneal dialysis catheter site infection, initial encounter [T85.71XA]     * Type 2 diabetes mellitus with chronic kidney disease on chronic dialysis, with long-term current use of insulin [E11.22, N18.6, Z99.2, Z79.4]    Procedure(s) (LRB):  REMOVAL-CATHETER-PERITONEAL DIALYSIS (Left)    Anesthesia: Local MAC    Procedure in Detail/Findings:  dictated    Estimated Blood Loss: 20 cc           Specimens     Start     Ordered    04/21/17 0855  Specimen to Pathology - Surgery  Once      04/21/17 0855        Implants: * No implants in log *           Disposition: PACU - hemodynamically stable.           Condition: Good    Attestation:  I performed the procedure.           Discharge Note    Admit Date: 4/19/2017    Attending Physician: Sg Bradley MD     Discharge Physician: Sg Bradley MD    Final Diagnosis: Post-Op Diagnosis Codes:     * Secondary hyperparathyroidism [N25.81]     * ESRD on dialysis [N18.6, Z99.2]     * ESRD  (end stage renal disease) on dialysis [N18.6, Z99.2]     * Hypertension associated with diabetes [E11.59, I10]     * Dyslipidemia associated with type 2 diabetes mellitus [E11.69, E78.5]     * Vitamin D deficiency [E55.9]     * Peritoneal dialysis catheter site infection, initial encounter [T85.71XA]     * Type 2 diabetes mellitus with chronic kidney disease on chronic dialysis, with long-term current use of insulin [E11.22, N18.6, Z99.2, Z79.4]    Disposition: Still a Patient    Patient Instructions:   Current Discharge Medication List      CONTINUE these medications which have NOT CHANGED    Details   allopurinol (ZYLOPRIM) 100 MG tablet Take 1 tablet (100 mg total) by mouth once daily.  Qty: 30 tablet, Refills: 1      aspirin (ECOTRIN) 81 MG EC tablet Take 1 tablet (81 mg total) by mouth once daily.  Qty: 30 tablet, Refills: 3      clopidogrel (PLAVIX) 75 mg tablet Take 1 tablet (75 mg total) by mouth once daily.  Qty: 90 tablet, Refills: 3    Associated Diagnoses: Coronary artery disease involving native coronary artery of native heart without angina pectoris      docusate sodium (COLACE) 100 MG capsule Take 1 capsule (100 mg total) by mouth 2 (two) times daily as needed for Constipation.  Qty: 100 capsule, Refills: 0      EX-LAX, SENNOSIDES, ORAL Take by mouth as needed. As directed      gabapentin (NEURONTIN) 300 MG capsule Take 1 capsule (300 mg total) by mouth every evening.  Qty: 30 capsule, Refills: 3      insulin aspart protamine-insulin aspart (NOVOLOG 70/30) 100 unit/mL (70-30) InPn pen Inject 5 Units into the skin 3 (three) times daily with meals.  Qty: 3 mL, Refills: 11      insulin glargine (LANTUS SOLOSTAR) 100 unit/mL (3 mL) InPn pen Inject 20 Units into the skin every evening.  Qty: 31 Syringe, Refills: 6    Comments: Please dispense with 31 Lantus pen needles with 6 refills.   Associated Diagnoses: Controlled type 2 diabetes mellitus with insulin therapy      lisinopril 10 MG tablet Take 1 tablet  (10 mg total) by mouth once daily.  Qty: 90 tablet, Refills: 3      NYSTOP powder APPLY TO AFFECTED AREA Q 6 H TO SITE  Refills: 0      ondansetron (ZOFRAN-ODT) 4 MG TbDL Refills: 1      potassium chloride SA (K-DUR,KLOR-CON) 20 MEQ tablet TK 1 T PO ONCE  D  Refills: 2      pravastatin (PRAVACHOL) 80 MG tablet Take 40 mg by mouth once daily.      senna-docusate 8.6-50 mg (PERICOLACE) 8.6-50 mg per tablet Take 1 tablet by mouth 2 (two) times daily.  Qty: 30 tablet, Refills: 0      vitamin renal formula, B-complex-vitamin c-folic acid, (NEPHROCAP) 1 mg Cap Take 1 capsule by mouth once daily.  Qty: 90 capsule, Refills: 1             Discharge Procedure Orders (must include Diet, Follow-up, Activity)  No discharge procedures on file.     Discharge Date: No discharge date for patient encounter.

## 2017-04-21 NOTE — PROGRESS NOTES
"Surgery follow up  BP (!) 106/51 (BP Location: Right arm, Patient Position: Lying, BP Method: Automatic)  Pulse 97  Temp 98.2 °F (36.8 °C) (Oral)   Resp (!) 21  Ht 5' 11" (1.803 m)  Wt 85.7 kg (189 lb)  SpO2 100%  BMI 26.36 kg/m2  I/O last 3 completed shifts:  In: 650 [P.O.:250; Other:400]  Out: 600 [Urine:200; Other:400]  I/O this shift:  In: -   Out: 150 [Urine:150]  Discussed with DR Arellano will schedule for removal cathter in am.  "

## 2017-04-21 NOTE — PROGRESS NOTES
Progress Note  Nephrology      Consult Requested By: Sg Bradley MD  Reason for Consult: PD cath tunneled infection    SUBJECTIVE:     ROS  Patient Active Problem List   Diagnosis    Unspecified transient cerebral ischemia    CHF with left ventricular diastolic dysfunction, NYHA class 1    ESRD (end stage renal disease) on dialysis    Diabetes mellitus, type 2    Dyslipidemia associated with type 2 diabetes mellitus    Hypertension associated with diabetes    Hyperuricemia    ESRD on dialysis    Organ transplant candidate    Pre-transplant evaluation for chronic kidney disease    Type 2 diabetes mellitus for many yrs about 25 yrs    Diabetic nephropathy    Secondary hyperparathyroidism    Anemia in chronic kidney disease    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    Neuropathy    DM type 2 causing renal disease    ESRD on peritoneal dialysis    Stroke    (HFpEF) heart failure with preserved ejection fraction    Elevated PSA    Transient alteration of awareness    Type 2 diabetes mellitus with diabetic chronic kidney disease    Vitamin D deficiency    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    Hand muscle atrophy    Cerebral infarct    Pharyngoesophageal dysphagia    Essential hypertension    Coronary artery disease involving native coronary artery of native heart without angina pectoris    Anemia of chronic disease    Syncope    Dysphagia    Pleural effusion    Pleural effusion, right    Hyperlipidemia    History of stroke    Right-sided chest pain    Malnutrition    Peritoneal dialysis catheter site infection       OBJECTIVE:     Medications:   allopurinol  100 mg Oral Daily    aspirin  81 mg Oral Daily    ceftAZIDime (FORTAZ) IVPB  1 g Intravenous Q12H    clopidogrel  75 mg Oral Daily    gabapentin  300 mg Oral QHS    insulin detemir  20 Units Subcutaneous QHS    lisinopril  10 mg Oral Daily    polyethylene glycol  17 g Oral Daily     pravastatin  40 mg Oral Daily    senna-docusate 8.6-50 mg  1 tablet Oral BID    vancomycin (VANCOCIN) IVPB  1,000 mg Intravenous Q24H    vitamin renal formula (B-complex-vitamin c-folic acid)  1 capsule Oral Daily        Vitals:    04/20/17 1630   BP: (!) 131/55   Pulse: 97   Resp: 18   Temp: 98.7 °F (37.1 °C)     I/O last 3 completed shifts:  In: 650 [P.O.:250; Other:400]  Out: 600 [Urine:200; Other:400]  Physical Exam    Constitutional: He is oriented to person, place, and time and well-developed, well-nourished, and in no distress. No distress.   HENT:   Head: Atraumatic.   Mouth/Throat: Oropharynx is clear and moist.   Eyes: Pupils are equal, round, and reactive to light. No scleral icterus.   Neck: Neck supple. No JVD present.   Cardiovascular: Normal rate, regular rhythm, normal heart sounds and intact distal pulses. Exam reveals no friction rub.   No murmur heard.  Pulmonary/Chest: Effort normal and breath sounds normal. No respiratory distress. He has no wheezes. He has no rales.   Abdominal: Soft. Bowel sounds are normal. He exhibits no distension. There is no tenderness. There is no rebound and no guarding.       Musculoskeletal: He exhibits edema (trace pre-sacral).   Neurological: He is alert and oriented to person, place, and time.   L facial droop - unchanged    Skin: Skin is warm and dry. No rash noted. No erythema.        Laboratory:    Recent Labs  Lab 04/17/17  0830 04/19/17  1120 04/20/17  0405   WBC 8.59 10.36 7.08   HGB 11.0* 12.4* 10.9*   HCT 33.5* 36.8* 33.1*    200 149*   MONO 8.1  0.7 6.8  0.7 7.3  0.5       Recent Labs  Lab 04/19/17  1120 04/20/17  0405    137   K 3.3* 3.2*    102   CO2 25 24   BUN 34* 34*   CREATININE 6.5* 6.2*   CALCIUM 9.7 8.7   PHOS 3.0 3.3     Labs reviewed  Diagnostic Results:  X-Ray: Reviewed  US: Reviewed  Echo: Reviewed      ASSESSMENT/PLAN:     1.Sepsis with PD cath abscess of the tunnel - cultures form exit site, PD fluid and blood  pending  Change to IV Ceftaz+Vanco--> yesterday were given IP, tomorrow AM to OR for PD cath removal and will transition to HD  Today 1 hour HD to ensure L Wrist AVF functioning. If not will need permacth placement simuntaneously with PD cath removal  NPO after midnight    WBC in PD fluid is only 22--> no evidence of peritonitis   ADD Diflucan --> last summer pt had candida exit  site infection,   2. ESRD - on PD: CCPD 10 hours, 5 exchanges 2.5L, day dwell 2L  Dry weight 82kg     As above     3. HTN - hypotensive today, treating for sepsis as above  4. Anemia - on outpt venofer and epo twice a month - hb at goal today  5. MBD -hold sevelamer for now, Phos 3  6. Nutrition - Nepro TID. Cardiac diet. Do not restrict K and phos. Replace K PO and 4K bath with HD today  6. DM 2- management per primary  7. Recurrent R sided pleural effusion-->thoracentesis done Thank you for allowing me to participate in the care of your patients  With any question please call [phone]  Eileen Hand    [facility]  BRISEYDA Cordoba MD, FACP,   JAVON Whittaker MD,   MD JORGE Mendez, NP  [address]  [place], [address] [phone]

## 2017-04-21 NOTE — PROGRESS NOTES
TN spoke with wife and she stated that [facility] is the best choice for HD location.  Everett Gastelum RN  Transitional Navigator/Case Management  [phone]

## 2017-04-21 NOTE — PROGRESS NOTES
[facility]  Infectious Disease  Progress Note    Patient Name: Terry Cote  MRN: [id number]  Admission Date: 4/19/2017  Length of Stay: 2 days  Attending Physician: Sg Bradley MD  Primary Care Provider: Duncan Rincon MD    Isolation Status: No active isolations  Assessment/Plan:      * Peritoneal dialysis catheter site infection  Terry Cote is a 68 yo AAM with PMH of CAD, HFpEF, CVA, DM2 with nephropathy, ESRD on PD, HTN, hyperparathyroidism, arthritis who was admitted to [facility] on 4/19 for PD cath site infection for which he is currently on Vanc/Ceftaz/Fluconazole. PD cath removed 4/21 in addition to drainage of adjacent SQ abscess. Blood/peritoneal fluid/wound/urine cultures thus far NGTD. Repeat pro-calcitonin greatly improved indicating good response to therapy.     1) Continue Vanc/Ceftaz, transition to IV with HD now that PD cath removed  2) If patient worsens, would transition Vanc to Linezolid based on concerning UA and h/o VRE  3) Agree with addition of Fluconazole per Renal, which is indicated as prevention for secondary fungal infection in patient with PD associated infection  4) Would treat for 7-days in total.      Thank you for your consult. Will follow patient peripherally. Please call with any questions.    Suzanne Daniel MD  Infectious Disease  [facility]    Subjective:     Principal Problem:Peritoneal dialysis catheter site infection    HPI: Terry Cote is a 68 yo AAM with PMH of CAD, HFpEF, CVA (x3, last 2014 with L facial droop/slurred speech), DM2 with nephropathy, ESRD on PD, HTN, hyperparathyroidism, arthritis who was admitted to [facility] on 4/19 for presumed PD cath infection. Ultrasound of PD cath c/w area of complex subcutaneous fluid collection. Patient currently on Vanc/Ceftaz. ID consulted for antibiotic recommendations.    Interval History: Patient taken to OR today for removal of PD catheter. Afebrile  over last 24 hours.    Review of Systems   Reason unable to perform ROS: patient in OR.   Too sleep post-op after re-attempt to see patient.    Objective:     Vital Signs (Most Recent):  Temp: 97.9 °F (36.6 °C) (04/21/17 1030)  Pulse: 92 (04/21/17 1030)  Resp: 14 (04/21/17 1030)  BP: (!) 152/68 (04/21/17 1030)  SpO2: 96 % (04/21/17 1156) Vital Signs (24h Range):  Temp:  [96.8 °F (36 °C)-98.7 °F (37.1 °C)] 97.9 °F (36.6 °C)  Pulse:  [] 92  Resp:  [7-21] 14  SpO2:  [93 %-100 %] 96 %  BP: (104-152)/(51-77) 152/68     Weight: 82.4 kg (181 lb 10.5 oz)  Body mass index is 25.34 kg/(m^2).    Estimated Creatinine Clearance: 13.2 mL/min (based on Cr of 5.7).    Physical Exam   Constitutional:   Patient in OR and unavailable for examination   Nursing note and vitals reviewed.  Sleeping, RRR, no murmurs, diminished R-sided breath sounds unchanged, prev PD cath site dressing in place    Significant Labs:   CBC:     Recent Labs  Lab 04/20/17  0405 04/21/17  0451   WBC 7.08 5.68   HGB 10.9* 9.9*   HCT 33.1* 30.0*   * 165     CMP:     Recent Labs  Lab 04/20/17  0405 04/21/17  0451    136   K 3.2* 3.8    99   CO2 24 29   * 201*   BUN 34* 30*   CREATININE 6.2* 5.7*   CALCIUM 8.7 8.4*   PROT 6.7 6.5   ALBUMIN 2.1* 2.1*   BILITOT 0.5 0.4   ALKPHOS 74 74   AST 29 29   ALT 54* 53*   ANIONGAP 11 8   EGFRNONAA 8* 9*     Procalcitonin: 12.46 --> 0.15    Microbiology:  4/21: Abdominal Body Fluid Cx: rare WBC, no organisms  4/21: Abdominal Body Fluid (aerobic) cx: collected  4/21: Abdominal Body Fluid (anaerobic) cx: collected  4/19: Blood cultures x 2: NGTD  4/19: Blood cultures x 2: NGTD  4/19: Urine culture (clean catch): NGTD  4/19: Peritoneal Fluid (gram stain): NGTD  4/19: Peritoneal Fluid (aerobic) x 2: NGTD  4/19: Wound culture (aerobic): NGTD  4/19: Wound culture (anaerobic): in process    4/20: Hepatitis panel: negative     History:  11/2016: Urine Cx - Klebsiella (pansensitive)  03/2016: Blood Cx -  MRSA (sensitive to Vanc)  11/2015: Urine Cx - VRE (sensitive to Linezolid)  08/2015: Urine Cx - Pseudomonas (pansensitive)    Significant Imaging: I have reviewed all pertinent imaging results/findings within the past 24 hours.

## 2017-04-22 ENCOUNTER — HOSPITAL ENCOUNTER (EMERGENCY)
Facility: HOSPITAL | Age: 67
Discharge: HOME | End: 2017-04-23
Attending: EMERGENCY MEDICINE
Payer: MEDICARE

## 2017-04-22 DIAGNOSIS — N18.6 ESRD (END STAGE RENAL DISEASE) ON DIALYSIS: Primary | ICD-10-CM

## 2017-04-22 DIAGNOSIS — R50.9 FEVER: ICD-10-CM

## 2017-04-22 DIAGNOSIS — Z99.2 ESRD (END STAGE RENAL DISEASE) ON DIALYSIS: Primary | ICD-10-CM

## 2017-04-22 LAB
ALBUMIN SERPL BCP-MCNC: 2.3 G/DL
ALP SERPL-CCNC: 84 U/L
ALT SERPL W/O P-5'-P-CCNC: 15 U/L
ANION GAP SERPL CALC-SCNC: 9 MMOL/L
AST SERPL-CCNC: 17 U/L
BASOPHILS # BLD AUTO: 0.03 K/UL
BASOPHILS NFR BLD: 0.4 %
BILIRUB SERPL-MCNC: 0.6 MG/DL
BUN SERPL-MCNC: 28 MG/DL
CALCIUM SERPL-MCNC: 9.1 MG/DL
CHLORIDE SERPL-SCNC: 107 MMOL/L
CO2 SERPL-SCNC: 21 MMOL/L
CREAT SERPL-MCNC: 5.5 MG/DL
DIFFERENTIAL METHOD: ABNORMAL
EOSINOPHIL # BLD AUTO: 0.4 K/UL
EOSINOPHIL NFR BLD: 5.2 %
ERYTHROCYTE [DISTWIDTH] IN BLOOD BY AUTOMATED COUNT: 15.3 %
EST. GFR  (AFRICAN AMERICAN): 11 ML/MIN/1.73 M^2
EST. GFR  (NON AFRICAN AMERICAN): 10 ML/MIN/1.73 M^2
GLUCOSE SERPL-MCNC: 123 MG/DL
HCT VFR BLD AUTO: 34.1 %
HGB BLD-MCNC: 11.1 G/DL
LACTATE SERPL-SCNC: 1.4 MMOL/L
LYMPHOCYTES # BLD AUTO: 1.2 K/UL
LYMPHOCYTES NFR BLD: 16.5 %
MCH RBC QN AUTO: 27.7 PG
MCHC RBC AUTO-ENTMCNC: 32.6 %
MCV RBC AUTO: 85 FL
MONOCYTES # BLD AUTO: 0.6 K/UL
MONOCYTES NFR BLD: 8.8 %
NEUTROPHILS # BLD AUTO: 4.8 K/UL
NEUTROPHILS NFR BLD: 68.8 %
PLATELET # BLD AUTO: 142 K/UL
PMV BLD AUTO: 9.2 FL
POTASSIUM SERPL-SCNC: 4.7 MMOL/L
PROT SERPL-MCNC: 7.5 G/DL
RBC # BLD AUTO: 4.01 M/UL
SODIUM SERPL-SCNC: 137 MMOL/L
WBC # BLD AUTO: 6.96 K/UL

## 2017-04-22 PROCEDURE — 63600175 PHARM REV CODE 636 W HCPCS

## 2017-04-22 PROCEDURE — 80053 COMPREHEN METABOLIC PANEL: CPT

## 2017-04-22 PROCEDURE — 96365 THER/PROPH/DIAG IV INF INIT: CPT

## 2017-04-22 PROCEDURE — 85025 COMPLETE CBC W/AUTO DIFF WBC: CPT

## 2017-04-22 PROCEDURE — 96366 THER/PROPH/DIAG IV INF ADDON: CPT

## 2017-04-22 PROCEDURE — 25000003 PHARM REV CODE 250

## 2017-04-22 PROCEDURE — 96367 TX/PROPH/DG ADDL SEQ IV INF: CPT

## 2017-04-22 PROCEDURE — 87040 BLOOD CULTURE FOR BACTERIA: CPT

## 2017-04-22 PROCEDURE — 83605 ASSAY OF LACTIC ACID: CPT

## 2017-04-22 PROCEDURE — 99284 EMERGENCY DEPT VISIT MOD MDM: CPT | Mod: 25

## 2017-04-22 PROCEDURE — 25000003 PHARM REV CODE 250: Performed by: EMERGENCY MEDICINE

## 2017-04-22 RX ORDER — ACETAMINOPHEN 500 MG
1000 TABLET ORAL
Status: COMPLETED | OUTPATIENT
Start: 2017-04-22 | End: 2017-04-22

## 2017-04-22 RX ORDER — OXYCODONE AND ACETAMINOPHEN 5; 325 MG/1; MG/1
2 TABLET ORAL
Status: COMPLETED | OUTPATIENT
Start: 2017-04-22 | End: 2017-04-22

## 2017-04-22 RX ADMIN — CEFTAZIDIME 1 G: 1 INJECTION, POWDER, FOR SOLUTION INTRAMUSCULAR; INTRAVENOUS at 11:04

## 2017-04-22 RX ADMIN — OXYCODONE AND ACETAMINOPHEN 2 TABLET: 5; 325 TABLET ORAL at 09:04

## 2017-04-22 RX ADMIN — VANCOMYCIN HYDROCHLORIDE 1000 MG: 1 INJECTION, POWDER, LYOPHILIZED, FOR SOLUTION INTRAVENOUS at 09:04

## 2017-04-22 RX ADMIN — ACETAMINOPHEN 1000 MG: 500 TABLET ORAL at 06:04

## 2017-04-22 NOTE — ED NOTES
APPEARANCE: Alert, oriented and in no acute distress.  CARDIAC: Normal rate and rhythm, no murmur heard.   PERIPHERAL VASCULAR: peripheral pulses present. Normal cap refill. No edema. Warm to touch.    RESPIRATORY:Normal rate and effort, breath sounds clear bilaterally throughout chest. Respirations are equal and unlabored no obvious signs of distress.  GASTRO: soft, bowel sounds normal, no tenderness, no abdominal distention.  MUSC: Full ROM. No bony tenderness or soft tissue tenderness. No obvious deformity.  SKIN: Skin is warm and dry, normal skin turgor, mucous membranes moist.  NEURO: 5/5 strength major flexors/extensors bilaterally. Sensory intact to light touch bilaterally. [place] coma scale: eyes open spontaneously-4, oriented & converses-5, obeys commands-6. No neurological abnormalities.   MENTAL STATUS: awake, alert and aware of environment.  EYE: PERRL, both eyes: pupils brisk and reactive to light. Normal size.  ENT: EARS: no obvious drainage. NOSE: no active bleeding.

## 2017-04-22 NOTE — ED AVS SNAPSHOT
[facility] [address]  [place] [address]               Terry Cote   2017  5:57 PM   ED    Description:  Male : 1950   Department:  [facility]           Your Care was Coordinated By:     Provider Role From To    Katie Winston MD Attending Provider 17 1759 17 2300      Reason for Visit     Fever           Diagnoses this Visit        Comments    ESRD (end stage renal disease) on dialysis    -  Primary     Fever           ED Disposition     ED Disposition Condition Comment    Discharge             To Do List           Follow-up Information     Please follow up.    Why:  follow up with dialysis on monday as scheduled      [facility]     [facility] -  Assistance  Unless otherwise directed by your provider, please contact [facility], our nurse care line that is available for  assistance.     Registered nurses in the [facility] provide: appointment scheduling, clinical advisement, health education, and other advisory services.  Call: [phone] (toll free)               Medications           Message regarding Medications     Verify the changes and/or additions to your medication regime listed below are the same as discussed with your clinician today.  If any of these changes or additions are incorrect, please notify your healthcare provider.        These medications were administered today        Dose Freq    acetaminophen tablet 1,000 mg 1,000 mg ED 1 Time    Sig: Take 2 tablets (1,000 mg total) by mouth ED 1 Time.    Class: Normal    Route: Oral    vancomycin 1 g in dextrose 5 % 250 mL IVPB (ready to mix system) 1,000 mg Once    Sig: Inject 250 mLs (1,000 mg total) into the vein once.    Class: Normal    Route: Intravenous    ceftAZIDime (FORTAZ) 1 g in dextrose 5 % 50 mL IVPB 1 g Once    Sig: Inject 1 g into the vein once.    Class: Normal    Route: Intravenous     oxycodone-acetaminophen 5-325 mg per tablet 2 tablet 2 tablet ED 1 Time    Sig: Take 2 tablets by mouth ED 1 Time.    Class: Normal    Route: Oral           Verify that the below list of medications is an accurate representation of the medications you are currently taking.  If none reported, the list may be blank. If incorrect, please contact your healthcare provider. Carry this list with you in case of emergency.           Current Medications     allopurinol (ZYLOPRIM) 100 MG tablet Take 1 tablet (100 mg total) by mouth once daily.    aspirin (ECOTRIN) 81 MG EC tablet Take 1 tablet (81 mg total) by mouth once daily.    clopidogrel (PLAVIX) 75 mg tablet Take 1 tablet (75 mg total) by mouth once daily.    dextrose 5 % SolP 50 mL with ceftAZIDime 1 gram SolR 1 g Inject 1 g into the vein every 12 (twelve) hours. 1 g of Ceftaz after HD.    docusate sodium (COLACE) 100 MG capsule Take 1 capsule (100 mg total) by mouth 2 (two) times daily as needed for Constipation.    EX-LAX, SENNOSIDES, ORAL Take by mouth as needed. As directed    gabapentin (NEURONTIN) 300 MG capsule Take 1 capsule (300 mg total) by mouth every evening.    insulin aspart protamine-insulin aspart (NOVOLOG 70/30) 100 unit/mL (70-30) InPn pen Inject 5 Units into the skin 3 (three) times daily with meals.    insulin glargine (LANTUS SOLOSTAR) 100 unit/mL (3 mL) InPn pen Inject 20 Units into the skin every evening.    lisinopril 10 MG tablet Take 1 tablet (10 mg total) by mouth once daily.    NYSTOP powder APPLY TO AFFECTED AREA Q 6 H TO SITE    ondansetron (ZOFRAN-ODT) 4 MG TbDL     potassium chloride SA (K-DUR,KLOR-CON) 20 MEQ tablet TK 1 T PO ONCE  D    pravastatin (PRAVACHOL) 80 MG tablet Take 40 mg by mouth once daily.    senna-docusate 8.6-50 mg (PERICOLACE) 8.6-50 mg per tablet Take 1 tablet by mouth 2 (two) times daily.    VANCOMYCIN HCL (VANCOMYCIN 1 G/250 ML D5W, READY TO MIX SYSTEM,) Inject 250 mLs (1,000 mg total) into the vein daily as  "needed. 1 g vancomycin after each dialysis session.    vitamin renal formula, B-complex-vitamin c-folic acid, (NEPHROCAP) 1 mg Cap Take 1 capsule by mouth once daily.           Clinical Reference Information           Your Vitals Were     BP Pulse Temp Resp Height Weight    137/66 102 98.7 °F (37.1 °C) 20 5' 11" (1.803 m) 82.1 kg (181 lb)    SpO2 BMI             93% 25.24 kg/m2         Allergies as of 4/23/2017     No Known Allergies      Immunizations Administered on Date of Encounter - 4/23/2017     None      ED Micro, Lab, POCT     Start Ordered       Status Ordering Provider    04/22/17 1803 04/22/17 1802  CBC auto differential  STAT      Final result     04/22/17 1803 04/22/17 1802  Comprehensive metabolic panel  STAT      Final result     04/22/17 1803 04/22/17 1802  Blood Culture #1 **CANNOT BE ORDERED STAT**  Once      In process     04/22/17 1803 04/22/17 1802  Blood culture  Once      In process     04/22/17 1803 04/22/17 1802  Lactic acid, plasma  STAT      Final result       ED Imaging Orders     Start Ordered       Status Ordering Provider    04/22/17 1921 04/22/17 1920  X-Ray Chest AP Portable  1 time imaging      Final result         Discharge Instructions           FOLLOW UP WITH DIALYSIS ON Monday AS SCHEDULED FOR YOUR NEXT DOSE OF ANTI-BIOTICS    RETURN TO ED IF SYMPTOMS WORSEN    Hemodialysis    Hemodialysis is a type of treatment for kidney failure (also called end-stage kidney disease or ESRD). It uses a machine that holds a filter called a dialyzer. As blood flows through the dialyzer, waste is removed and fluid and chemicals are balanced. Hemodialysis treatments are usually done at a special dialysis center. In some cases, treatments may be done at home. As the kidney failure is getting worse, your doctor may advise you to have an access placed by a surgeon into one of your arms ahead of time. This access may take several weeks to mature before it can be used for hemodialysis.  How " hemodialysis is done  Two needles are inserted into a blood vessel (called an arteriovenous fistula or AV fistula) or arteriovenous graft (or AV graft), usually in your arm. Each needle is attached to a tube. One tube carries your blood into the dialyzer, where it is cleaned. Clean blood returns to your body through a second tube and needle. If this treatment has to be done as an emergency, a plastic tube (caltheter) is inserted into a large vein, typically in the neck or groin. This catheter helps carry blood to and from the dialysis machine.  Your experience  · Hemodialysis usually takes about 3 to 5 hours. It is usually done 3 times a week.  · Youll have a regular schedule for your hemodialysis. Many centers have evening and weekend hours as well as weekday hours to help you continue working.  · A trained nurse or technician connects you to the dialysis machine. He or she watches for problems and makes sure you are comfortable.  · During treatment, only a small amount of blood (about 1 cup) is out of your body at any one time.  · During or after your first few treatments, you may have a headache, muscle cramps, or feel nauseated. These should decrease as your body gets used to the treatments.  · Some people are able to learn to use a dialysis at home.  Problems to watch for  Call your nurse or dialysis technician if you have any of these symptoms during or after treatment:  · Chest pain  · Bleeding from the needle site  · Shortness of breath  · Fever or chills  · Headache or lightheadedness  · Nausea or vomiting  · Itching  · Muscle cramps  · Pain, warm or redness at your access site  · Inability to feel yoor fistulaur blood flow and pulse at your AV graft   Date Last Reviewed: 12/1/2014  © 5159-5378 [employer]. [address] [place] [address], [place] [address]. All rights reserved. This information is not intended as a substitute for professional medical care. Always follow your healthcare professional's  instructions.          Your Scheduled Appointments     May 04, 2017 10:00 AM T   Hospital Follow Up with MD [place] [facility] ([facility] [place])    [address]  [place] [id number] [phone]               [facility] complies with applicable Federal civil rights laws and does not discriminate on the basis of race, color, national origin, age, disability, or sex.        Language Assistance Services     ATTENTION: Language assistance services are available, free of charge. Please call [phone].      ATENCIÓN: Si habla frediañol, tiene a moody disposición servicios gratuitos de asistencia lingüística. Llame al [phone].     CHÚ Ý: N?u b?n nói Ti?ng Vi?t, có các d?ch v? h? tr? ngôn ng? mi?n phí dành cho b?n. G?i s? [phone].

## 2017-04-22 NOTE — ED NOTES
67 year old male presents to ed with chief complaint of fever. Patient was sent for eval after home health nurse noted patient had fever at home. Patient discharged yesterday after peritoneal dialysis access was taken out. Patient presents awake alert ox4 in no respiratory distress patient denies chest pain at this time

## 2017-04-23 VITALS
OXYGEN SATURATION: 93 % | BODY MASS INDEX: 25.34 KG/M2 | HEIGHT: 71 IN | HEART RATE: 102 BPM | WEIGHT: 181 LBS | DIASTOLIC BLOOD PRESSURE: 66 MMHG | SYSTOLIC BLOOD PRESSURE: 137 MMHG | RESPIRATION RATE: 20 BRPM | TEMPERATURE: 99 F

## 2017-04-23 NOTE — ED PROVIDER NOTES
Encounter Date: 4/22/2017       History     Chief Complaint   Patient presents with    Fever     Patient was discharged yesterday and started having fever of 101 today     Review of patient's allergies indicates:  No Known Allergies  HPI Comments: 68 Y/O AAM WITH PMHX OF ESRD, DM, HTN, CAD, CVA AND AOCD PRESENTS WITH FEVER.  PT WAS DISCHARGED FROM THE HOSPITAL YESTERDAY AFTER BEING TREATED FOR INFECTED PD SITE.  PT HAD THE PD ACCESS REMOVED AS WELL AS DRAINAGE OF AN ASSOCIATED ABSCESS.  PT WAS TREATED WITH IV ABX WHILE ADMITTED AND D/C HOME WITH ORDER FOR IV VANCOMYCIN DURING HD ON M/W/F.  PT LAST DIALYSIS TX WAS YESTERDAY PRIOR TO DISCHARGE.  PT HH NURSE NOTED THAT HE HAD A FEVER  AT HOME TODAY AND INSTRUCTED HIM TO RETURN TO ED.   PT REPORTS DRY COUGH, NO SOB.  + ABDOMINAL DISCOMFORT IN THE AREA OF HIS WOUND.  PT REPORTS MILD DRAINAGE FROM WOUND NOTED ON BANDAGE WHEN HH NURSE CHANGED HIS DRESSINGS PTA.  PT REPORTS MAKING VERY LITTLE URINE.  NO CHANGE IN UO ACUTELY.  NO CHEST PAIN.  NO VOMITING.      The history is provided by the patient, the spouse, medical records and the EMS personnel. No  was used.     Past Medical History:   Diagnosis Date    Anemia in chronic kidney disease     Arthritis     unable to walk due to arthritis in knees.     Coronary artery disease     Diabetes mellitus     Diabetes mellitus, type 2     Diabetic nephropathy 4/22/2015    Diastolic dysfunction     Hypertension     Myocardial infarction 2001    Peritoneal dialysis catheter in place 2014    Secondary hyperparathyroidism     Stroke 2001, 2013, 10/2014    weak, slurred speech, equal hand grasp/equal leg movements    Type 2 diabetes mellitus for many yrs about 25 yrs 4/22/2015    Unspecified disorder of kidney and ureter      Past Surgical History:   Procedure Laterality Date    AV FISTULA PLACEMENT Left 2011    wrist;    CATARACT EXTRACTION      NECK SURGERY      PERITONEAL CATHETER  INSERTION      PORTACATH PLACEMENT  march 2016    left chest    SPINE SURGERY       Family History   Problem Relation Age of Onset    Cancer Mother     Cancer Father     No Known Problems Sister     No Known Problems Brother     No Known Problems Sister     No Known Problems Sister     No Known Problems Brother      Social History   Substance Use Topics    Smoking status: Never Smoker    Smokeless tobacco: Never Used    Alcohol use No     Review of Systems   Constitutional: Positive for chills and fever.   HENT: Negative for congestion, sinus pressure and sore throat.    Eyes: Negative for discharge and redness.   Respiratory: Positive for cough. Negative for shortness of breath and wheezing.    Cardiovascular: Negative for chest pain and palpitations.   Gastrointestinal: Negative for diarrhea, nausea and vomiting.        ABDOMINAL DISCOMFORT IN THE AREA OF HIS WOUND   Endocrine: Negative for polydipsia and polyphagia.   Genitourinary:        NO ACUTE CHANGE IN URINE OUTPUT   Musculoskeletal: Negative for back pain and neck pain.   Skin: Positive for wound. Negative for pallor.        ABDOMINAL WOUND IS WELL APPEARING, SLIGHT ERYTHEMA LEFT LATERAL WOUND EDGE AND MINIMAL BLOODY DRAINAGE.  NO SWELLING OR FLUCTUANCE NOTED.    Neurological: Negative for dizziness, weakness and headaches.   Hematological: Negative.    Psychiatric/Behavioral: Negative for agitation and confusion.   All other systems reviewed and are negative.      Physical Exam   Initial Vitals   BP Pulse Resp Temp SpO2   04/22/17 1755 04/22/17 1755 04/22/17 1755 04/22/17 1755 04/22/17 1755   146/81 126 16 101 °F (38.3 °C) 95 %     Physical Exam    Nursing note and vitals reviewed.  Constitutional: He appears well-developed and well-nourished. He is not diaphoretic. No distress.   HENT:   Head: Normocephalic and atraumatic.   DRY MM   Eyes: Conjunctivae and EOM are normal. No scleral icterus.   Neck: Normal range of motion. Neck supple.    Cardiovascular: Normal rate, regular rhythm and normal heart sounds. Exam reveals no gallop and no friction rub.    No murmur heard.  TACHYCARDIA   Pulmonary/Chest: Breath sounds normal. No respiratory distress. He has no wheezes. He has no rhonchi. He has no rales. He exhibits no tenderness.   Abdominal: Soft. Bowel sounds are normal. He exhibits no distension. There is no tenderness.   Musculoskeletal: Normal range of motion.   Lymphadenopathy:     He has no cervical adenopathy.   Neurological: He is alert and oriented to person, place, and time. He has normal strength. No sensory deficit.   Skin: Skin is warm and dry. There is erythema.   ABDOMINAL WOUND IS WELL APPEARING, SLIGHT ERYTHEMA LEFT LATERAL WOUND EDGE AND MINIMAL BLOODY DRAINAGE.  NO SWELLING OR FLUCTUANCE NOTED.    Psychiatric: He has a normal mood and affect. His behavior is normal. Judgment and thought content normal.         ED Course   Procedures  Labs Reviewed   CBC W/ AUTO DIFFERENTIAL - Abnormal; Notable for the following:        Result Value    RBC 4.01 (*)     Hemoglobin 11.1 (*)     Hematocrit 34.1 (*)     RDW 15.3 (*)     Platelets 142 (*)     Lymph% 16.5 (*)     All other components within normal limits   COMPREHENSIVE METABOLIC PANEL - Abnormal; Notable for the following:     CO2 21 (*)     Glucose 123 (*)     BUN, Bld 28 (*)     Creatinine 5.5 (*)     Albumin 2.3 (*)     eGFR if  11 (*)     eGFR if non  10 (*)     All other components within normal limits   CULTURE, BLOOD   CULTURE, BLOOD   LACTIC ACID, PLASMA          X-Rays:   Independently Interpreted Readings:   Chest X-Ray: Normal heart size.  No infiltrates.  No acute abnormalities.     Medical Decision Making:   Initial Assessment:   68 Y/O MALE WITH DX OF WOUND INFECTION OF PERITONEAL DIALYSIS SITE ON 4/19/17 D/C ON 4/21/17 PRESENTS TO ED WITH FEVER AND CHILLS.  NO N/V.    Differential Diagnosis:   DDX: SEPSIS, FEBRILE ILLNESS, WOUND  INFECTION  Independently Interpreted Test(s):   I have ordered and independently interpreted X-rays - see prior notes.  Clinical Tests:   Lab Tests: Ordered and Reviewed  The following lab test(s) were unremarkable: Lactate       <> Summary of Lab: CKD  NORMAL WBC    Radiological Study: Ordered and Reviewed  ED Management:   PT IS WELL APPEARING.  FEVER HAS BEEN TREATED WITH PO TYLENOL.   PT IS WELL APPEARING AND FEELS BETTER NOW THAT TEMP HAS NORMALIZED.     I HAVE CONSULTED [facility] HOSPITALIST AND THEY WILL COME TO ED TO EVALUATE THE PATIENT. ACCORDING TO [facility] RESIDENT AFTER FURTHER INVESTIGATION, THE PATIENT WAS DISCHARGED PRIOR TO GETTING HIS VANCOMYCIN YESTERDAY.  HE IS GIVEN A DOSE OF IV VANCOMYCIN IN ED.  THE [facility] RESIDENT HAS CONFIRMED THAT PT HAS VANCOMYCIN ORDERED FOR AFTER HD ROUTINELY AND REPORTS THERE IS NO INDICATION FOR HOSPITALIZATION AT THIS TIME.  THE [facility] TEAM HAS DISCUSSED THIS WITH THE PATIENT AND HIS FAMILY AND THEY AGREE WITH THE PLAN.      Pt counseled on their diagnosis and the importance of following up with PCP.  Pt also cautioned on when to return to ED.  Pt verbalizes understanding of discharge plan and will return to ED immediately if symptoms worsen    Other:   I have discussed this case with another health care provider.                   ED Course     Clinical Impression:   The primary encounter diagnosis was ESRD (end stage renal disease) on dialysis. A diagnosis of Fever was also pertinent to this visit.    Disposition:   Disposition: Discharged  Condition: Stable       Katie Winston MD  04/22/17 [id number]

## 2017-04-23 NOTE — DISCHARGE INSTRUCTIONS
FOLLOW UP WITH DIALYSIS ON Monday AS SCHEDULED FOR YOUR NEXT DOSE OF ANTI-BIOTICS    RETURN TO ED IF SYMPTOMS WORSEN    Hemodialysis    Hemodialysis is a type of treatment for kidney failure (also called end-stage kidney disease or ESRD). It uses a machine that holds a filter called a dialyzer. As blood flows through the dialyzer, waste is removed and fluid and chemicals are balanced. Hemodialysis treatments are usually done at a special dialysis center. In some cases, treatments may be done at home. As the kidney failure is getting worse, your doctor may advise you to have an access placed by a surgeon into one of your arms ahead of time. This access may take several weeks to mature before it can be used for hemodialysis.  How hemodialysis is done  Two needles are inserted into a blood vessel (called an arteriovenous fistula or AV fistula) or arteriovenous graft (or AV graft), usually in your arm. Each needle is attached to a tube. One tube carries your blood into the dialyzer, where it is cleaned. Clean blood returns to your body through a second tube and needle. If this treatment has to be done as an emergency, a plastic tube (caltheter) is inserted into a large vein, typically in the neck or groin. This catheter helps carry blood to and from the dialysis machine.  Your experience  · Hemodialysis usually takes about 3 to 5 hours. It is usually done 3 times a week.  · Youll have a regular schedule for your hemodialysis. Many centers have evening and weekend hours as well as weekday hours to help you continue working.  · A trained nurse or technician connects you to the dialysis machine. He or she watches for problems and makes sure you are comfortable.  · During treatment, only a small amount of blood (about 1 cup) is out of your body at any one time.  · During or after your first few treatments, you may have a headache, muscle cramps, or feel nauseated. These should decrease as your body gets used to the  treatments.  · Some people are able to learn to use a dialysis at home.  Problems to watch for  Call your nurse or dialysis technician if you have any of these symptoms during or after treatment:  · Chest pain  · Bleeding from the needle site  · Shortness of breath  · Fever or chills  · Headache or lightheadedness  · Nausea or vomiting  · Itching  · Muscle cramps  · Pain, warm or redness at your access site  · Inability to feel yoor fistulaur blood flow and pulse at your AV graft   Date Last Reviewed: 12/1/2014  © 5981-5103 [employer]. [address] [place] [id number]. All rights reserved. This information is not intended as a substitute for professional medical care. Always follow your healthcare professional's instructions.

## 2017-04-23 NOTE — ED NOTES
[facility] residents at bedside to discuss plan of care and discharge instructions with pt. And caregiver: pt. To receive future doses of antibiotics with dialysis treatments.

## 2017-04-23 NOTE — ED NOTES
Iv in rt. Arm flushed and is infiltrated. md updated. Medicine team consulted and awaiting eval for possible discharge planning. Caregiver at bedside with pt.. Pt. Is resting quietly without complaints. Talking to family member on phone

## 2017-04-23 NOTE — ED NOTES
Bedside report with phil Camarillo rn. Pt. Is awake and resting quietly. Family member at bedside. Pt. Has yellow productive cough which he reports is not new. Lab at bedside for blood draw.

## 2017-04-23 NOTE — DISCHARGE SUMMARY
[facility] Med Discharge Summary    Primary Team: [facility]  Attending Physician: No att. providers found  Resident: Ernesto  Intern: Armen    Date of Admit: 4/19/2017  Date of Discharge: 4/23/2017    Discharge to: home  Condition: stable    Discharge Diagnoses     Patient Active Problem List   Diagnosis    Unspecified transient cerebral ischemia    CHF with left ventricular diastolic dysfunction, NYHA class 1    ESRD (end stage renal disease) on dialysis    Diabetes mellitus, type 2    Dyslipidemia associated with type 2 diabetes mellitus    Hypertension associated with diabetes    Hyperuricemia    ESRD on dialysis    Organ transplant candidate    Pre-transplant evaluation for chronic kidney disease    Type 2 diabetes mellitus for many yrs about 25 yrs    Diabetic nephropathy    Secondary hyperparathyroidism    Anemia in chronic kidney disease    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    Neuropathy    DM type 2 causing renal disease    ESRD on peritoneal dialysis    Stroke    (HFpEF) heart failure with preserved ejection fraction    Elevated PSA    Transient alteration of awareness    Type 2 diabetes mellitus with diabetic chronic kidney disease    Vitamin D deficiency    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    Hand muscle atrophy    Cerebral infarct    Pharyngoesophageal dysphagia    Essential hypertension    Coronary artery disease involving native coronary artery of native heart without angina pectoris    Anemia of chronic disease    Syncope    Urinary tract infection without hematuria    Dysphagia    Pleural effusion    Pleural effusion, right    Hyperlipidemia    History of stroke    Right-sided chest pain    Malnutrition    Peritoneal dialysis catheter site infection    Cutaneous abscess of abdominal wall       Consultants and Procedures     Consultants:  Nepho, ID    Procedures:   PD cath removal    Brief History of Present Illness       Terry Cote is a 67 y.o.  male who  has a past medical history of Anemia in chronic kidney disease; Arthritis; Coronary artery disease; Diabetes mellitus; Diabetes mellitus, type 2; Diabetic nephropathy (4/22/2015); Diastolic dysfunction; Hypertension; Myocardial infarction (2001); Peritoneal dialysis catheter in place (2014); Secondary hyperparathyroidism; Stroke (2001, 2013, 10/2014); Type 2 diabetes mellitus for many yrs about 25 yrs (4/22/2015); and Unspecified disorder of kidney and ureter.  The patient presented to [facility] on 4/19/2017 with a primary complaint of Hypotension (accompanied by possible wound infection to peritoneal dialyisis site; sent by Dr. Elizalde; denies pain or fever; does dialysis every night)  .     The patient was in their usual state of health until 4 days prior to admission. The patient notes that 4 days prior to admission he began to notices some discharge from the PD catether site. He describes the discharge as yellow and lessening discharge over the last day. He states that he went to [facility] for a check up and was told his blood pressure was low and that he needed to come to the ED.  He states that when he washed the area he felt a warm sensation. Denies chest pain, fever, shortness of breath, diarrhea, nausea/vomiting.        For the full HPI please refer to the History & Physical from this admission.    Hospital Course By Problem with Pertinent Findings     Peritoneal catheter infection  - patient sent from [facility] for evaluation  - nephrology on board, appreciate recs  - ceftazidine via PD  - blood and peritoneal cultures- peritoneal fluid benign   - patient remains afebrile without leukocytosis  - US abdomen: fluid collection  - will consult Dr. Flynn, appreciate recs   - Nephology on board- patient to begin HD, PD catheter removed 4/21; vanc and ceftazidine IV started 4/20- patient will complete treatment with abx at HD; HD facilty contacted,  will follow up with nephrology      UTI  - patient with positive UA  - has had pseudomas and VRE in past  - urine culture negative   - ID following, appreciate recs  - asymptomatic, stable at discharge       ESRD  - Cr at baseline per records  - Continued PD nightly with abx; discontinued 4/20  - patient began on HD 4/20, follow up with nephrology as outpatient   - Nephrology consulted, appreciate recs      DM2  - A1c 5.5 2/8/17  - patient taking insulin glargine 20u nightly and aspart 5u with meals  - patients home sugars run in high 100s, stable at discharge       HTN  - patient hypotensive on admission, antihypertensives inititaly held  - restarted medication at discharge      Hypokalemia  - resolved  - will monitor and replete as needed      Hypomagnesemia   - resolved  - will monitor and replete as needed      HFpEF  - discharged on metoprolol but not on home med list   - echo with normal EF and LV diastolic dysfunction  - no acute needs       CAD  - continue ASA, plavix, statin  - no acute issues      H/o Stroke  - continued ASA, plavix, statin       Constipation  - last BM 2 days prior to admission, did not take stool softener for two days  - will continue stool softener, patient had BM      HLD  - continue home statin       Anemia of Chronic Disease  - worked up 11/2016  - likely 2/2 chronic renal disease  - h/h stable   - will monitor    Discharge Medications        Medication List      START taking these medications          dextrose 5 % SolP 50 mL with ceftAZIDime 1 gram SolR 1 g   Inject 1 g into the vein every 12 (twelve) hours. 1 g of Ceftaz after HD.       VANCOMYCIN 1 G/250 ML D5W (READY TO MIX SYSTEM)   Inject 250 mLs (1,000 mg total) into the vein daily as needed. 1 g vancomycin after each dialysis session.         CHANGE how you take these medications          gabapentin 300 MG capsule   Commonly known as:  NEURONTIN   Take 1 capsule (300 mg total) by mouth every evening.   What changed:  when to  take this       insulin aspart protamine-insulin aspart 100 unit/mL (70-30) Inpn pen   Commonly known as:  NovoLOG 70/30   Inject 5 Units into the skin 3 (three) times daily with meals.   What changed:  how much to take         CONTINUE taking these medications          allopurinol 100 MG tablet   Commonly known as:  ZYLOPRIM   Take 1 tablet (100 mg total) by mouth once daily.       aspirin 81 MG EC tablet   Commonly known as:  ECOTRIN   Take 1 tablet (81 mg total) by mouth once daily.       clopidogrel 75 mg tablet   Commonly known as:  PLAVIX   Take 1 tablet (75 mg total) by mouth once daily.       docusate sodium 100 MG capsule   Commonly known as:  COLACE   Take 1 capsule (100 mg total) by mouth 2 (two) times daily as needed for Constipation.       EX-LAX (SENNOSIDES) ORAL       insulin glargine 100 unit/mL (3 mL) Inpn pen   Commonly known as:  LANTUS SOLOSTAR   Inject 20 Units into the skin every evening.       lisinopril 10 MG tablet   Take 1 tablet (10 mg total) by mouth once daily.       NYSTOP powder   Generic drug:  nystatin       ondansetron 4 MG Tbdl   Commonly known as:  ZOFRAN-ODT       potassium chloride SA 20 MEQ tablet   Commonly known as:  K-DUR,KLOR-CON       pravastatin 80 MG tablet   Commonly known as:  PRAVACHOL       senna-docusate 8.6-50 mg 8.6-50 mg per tablet   Commonly known as:  PERICOLACE   Take 1 tablet by mouth 2 (two) times daily.       vitamin renal formula (B-complex-vitamin c-folic acid) 1 mg Cap   Commonly known as:  NEPHROCAP   Take 1 capsule by mouth once daily.            Where to Get Your Medications      You can get these medications from any pharmacy     Bring a paper prescription for each of these medications     dextrose 5 % SolP 50 mL with ceftAZIDime 1 gram SolR 1 g    VANCOMYCIN 1 G/250 ML D5W (READY TO MIX SYSTEM)             Discharge Information:   Diet:  Cardiac renal     Physical Activity:  As tolerated    Instructions:  1. Take all medications as prescribed  2.  Keep all follow-up appointments  3. Return to the hospital or call your primary care physicians if any worsening symptoms such as chest pain, shortness of breath, weakness, dizziness, intractable nausea/vomiting/abdominal pain occur.      Follow-Up Appointments:  PCP 1-2 weeks  Nephrology 2 weeks  Sienna Ayers  [facility] Internal Medicine, -1

## 2017-04-24 ENCOUNTER — PATIENT OUTREACH (OUTPATIENT)
Dept: ADMINISTRATIVE | Facility: CLINIC | Age: 67
End: 2017-04-24
Payer: MEDICARE

## 2017-04-24 LAB
BACTERIA BLD CULT: NORMAL
BACTERIA BLD CULT: NORMAL
BACTERIA SPEC AEROBE CULT: NO GROWTH

## 2017-04-24 NOTE — PATIENT INSTRUCTIONS
Wound Check After Surgery, No Complication  Surgery involves cutting through layers of skin, fatty tissue, muscle, and sometimes bone and cartilage. Sutures (stitches) or staples are used to close all layers of the wound. The sutures on the inside will dissolve in about 2 to 3 weeks. Any sutures or staples used on the outside need to be removed in about 7 to 14 days, depending on the location.  It is normal to have some clear or bloody discharge on the wound covering or bandage (dressing) for the first few days after surgery. If your wound was sutured (sewn) closed, you should not have to change the dressing more than twice a day in the first few days. Bleeding or discharge requiring more frequent dressing changes can be a sign of a problem.  It is normal to feel pain at the incision site. The pain decreases as the wound heals. Most of the pain and soreness from the skin incision should go away by the time the sutures or staples are removed. Soreness and pain from deeper tissues may last another week or two.  Pain that continues more than a few weeks after surgery or pain that worsens anytime after surgery can be a sign of a problem, such as:  · Infection  · Separation of wound edges  · Collection of blood or other below the skin  Home care  Different types of surgery require different types of care and dressing changes. It is important to follow all instructions and advice from your surgeon, as well as other members of your healthcare team.  Wound care  · Keep the wound clean, as directed by your healthcare provider.  · Change the dressing as directed. Change the dressing sooner if it becomes wet or stained with blood or fluid from the wound.  · Bathe with a sponge (no shower or tub baths) for the first few days after surgery, or until there is no more drainage from the wound. Unless you received different instructions from your surgeon, you can then shower. Do not soak the area in water (no baths or swimming)  until the tape, sutures, or staples are removed and any wound opening has dried out and healed.  Changing the dressing  · Wash your hands before changing the dressings.  · Carefully remove the dressing and tape; dont just yank it off. If it sticks to the wound, you may need to wet it a little to remove it, unless your healthcare provider told you not to wet it.  · Wash your hands again before putting on a new, clean dressing.  · Gently clean the wound with clean water (or saline) using gauze or a clean washcloth. Do not rub it or pick at it.  · Do not use soap, alcohol, hydrogen peroxide, or any other cleanser.  · If you were told to dry the wound before putting on a new dressing, gently pat it dry. Do not rub.  · Throw out the old dressing. Do not reuse it!  · Wash your hands again when you are done.  Types of dressings  Your healthcare team will tell you what type of dressing to put on your wound. Follow your healthcare teams instructions carefully, and contact them if you have any questions. Two common types of dressings are described below. You may have one of these or another type.  · Dry dressing. Use dry gauze. If the wound is still draining, use a nonadherent dressing, which shouldnt stick to the wound.  · Wet-to-dry dressing. Wet the gauze, and squeeze out the excess water (or saline), before putting it on. Then, cover this with a dry pad.  Medicines  · If you were given antibiotics, take them until they are used up or your healthcare provider tells you to stop. It is important to finish the antibiotics even though you feel better, to make sure the infection has cleared.  · You can take acetaminophen or ibuprofen for pain, unless you were given a different pain medicine to use. (Note: If you have chronic liver or kidney disease, or have ever had a stomach ulcer or gastrointestinal bleeding, or are taking blood thinner medicines, talk with your healthcare provider before using these  medicines.)  · Aspirin should never be used in anyone under 18 years of age who is ill with a fever. It may cause severe liver damage.  Follow-up care  Follow up with your healthcare provider, or as advised, for your next wound check or removal of your sutures, staples, or tape.  · If a culture was done, you will be notified if the results will affect your treatment. You can call as directed for the results.  · If imaging tests, such as X-rays, an ultrasound, or CT scan were done, they will be reviewed by a specialist. You will be notified of the results, especially if they affect treatment.  Call 911  Call emergency services right away if any of these occur:  · Trouble breathing or swallowing, wheezing  · Hoarse voice or trouble speaking  · Extreme confusion  · Extreme drowsiness or trouble awakening  · Fainting or loss of consciousness  · Rapid heart rate or very slow heart rate  · Vomiting blood, or large amounts of blood in stool  · Discomfort in the center of the chest that feels like pressure, squeezing, a sense of fullness, or pain.  · Discomfort or pain in other upper body areas, such as the back, one or both arms, neck, jaw, or stomach  · Stroke symptoms (spot a stroke FAST)  ¨ F: Face drooping. One side of the face is numb or droops.  ¨ A: Arm weakness. One arm feels weak or numb.  ¨ S: Speech difficulty: Speech is slurred, or the person is unable to speak.  ¨ T: Time to call 911. Even if symptoms go away, call 911.  When to seek medical advice  Call your healthcare provider right away if any of the following occur:  · Increasing pain at the site of surgery  · Fever over 100.4º F (38º C)  · Redness around the wound  · Fluid, pus, or blood draining from the wound  · Vomiting, constipation, or diarrhea  Date Last Reviewed: 9/27/2015  © 7141-9620 [employer]. [address] [place] [address], [place] [address]. All rights reserved. This information is not intended as a substitute for professional  medical care. Always follow your healthcare professional's instructions.

## 2017-04-25 ENCOUNTER — TELEPHONE (OUTPATIENT)
Dept: PRIMARY CARE CLINIC | Facility: CLINIC | Age: 67
End: 2017-04-25

## 2017-04-25 LAB — BACTERIA SPEC ANAEROBE CULT: NORMAL

## 2017-04-25 NOTE — PHYSICIAN QUERY
PT Name: Terry Cote  MR #: [id number]    Physician Query Form - Nutrition Clarification     CDS/: Brenda Lara               Contact information: [email or web address]      This form is a permanent document in the medical record.     Query Date: April 25, 2017    By submitting this query, we are merely seeking further clarification of documentation.. Please utilize your independent clinical judgment when addressing the question(s) below.    The Medical record contains the following:   Indicators  Supporting Clinical Findings Location in Medical Record    % of Estimated Energy Intake over a time frame from p.o., TF, or TPN     X Weight Status over a time frame 189 on 4/19 to 181 on discharge Nutritional assessment                     x Wt / BMI / Usual Body Weight 181 Nutritional assessment    Delayed Wound Healing / Failure to Thrive     X Acute or Chronic Illness ESRD HP, PN, DS    Medication     X Treatment Recommendation/Intervention:   1. Rec add 2000 ADA diet restriction to diet order   2. Boost glucose- chocolate tid   3. RD to monitor      Goals: Patient to meet 85% EEN  Nutrition Goal Status: new  Communication of RD Recs: reviewed with RN        X Other Diagnosis of malnutrition PN, DS     AND / ASPEN Clinical Characteristics (October 2011)  A minimum of two characteristics is recommended for diagnosing either moderate or severe malnutrition   Mild Malnutrition Moderate Malnutrition Severe Malnutrition   Energy Intake from p.o., TF or TPN. < 75% intake of estimated energy needs for less than 7 days < 75% intake of estimated energy needs for greater than 7 days < 50% intake of estimated energy needs for > 5 days   Weight Loss 1-2% in 1 month  5% in 3 months  7.5% in 6 months  10% in 1 year 1-2 % in 1 week  5% in 1 month  7.5% in 3 months  10% in 6 months  20% in 1 year > 2% in 1 week  > 5% in 1 month  > 7.5% in 3 months  > 10% in 6 months  > 20% in 1 year   Physical Findings     None  *Mild subcutaneous fat and/or muscle loss  *Mild fluid accumulation  *Stage II decubitus  *Surgical wound or non-healing wound *Mod/severe subcutaneous fat and/or muscle loss  *Mod/severe fluid accumulation  *Stage III or IV decubitus  *Non-healing surgical wound     Provider, please specify diagnosis or diagnoses associated with above clinical findings.    [ ] Mild Protein-Calorie Malnutrition  [ ] Moderate Protein-Calorie Malnutrition  [ ] Underweight  [ ] Other Nutritional Diagnosis (please specify): ____________________________________  [ ] Other: ________________________________  [ ] Clinically Undetermined    Please document in your progress notes daily for the duration of treatment until resolved and include in your discharge summary.

## 2017-04-25 NOTE — TELEPHONE ENCOUNTER
Call placed to pt to offer earlier appt than 5/4/17 r/t ER visit. Avail appt tomorrow 4/26/17. Unable to reach pt, left  msg with above info and numbers for him to call to r/s appt if he can come to [facility] tomorrow.

## 2017-04-25 NOTE — TELEPHONE ENCOUNTER
----- Message from Miquel Murillo sent at 4/25/2017 10:47 AM CDT -----  Contact: self/[phone]  Patient is returning your call.  Please advise.

## 2017-04-25 NOTE — TELEPHONE ENCOUNTER
----- Message from Shasta Pacheco LPN sent at 4/24/2017  3:57 PM CDT -----  Contact: [phone]       ----- Message -----     From: Lara Torre     Sent: 4/24/2017  10:42 AM       To: Mckenzie Song Staff    Patient states his heart rate was 110 on 04-22-17  Also temp. Was 105 patient was seen at the emergency room

## 2017-04-28 LAB
BACTERIA BLD CULT: NORMAL
BACTERIA BLD CULT: NORMAL

## 2017-05-01 LAB — BACTERIA SPEC ANAEROBE CULT: NORMAL

## 2017-05-04 ENCOUNTER — OFFICE VISIT (OUTPATIENT)
Dept: PRIMARY CARE CLINIC | Facility: CLINIC | Age: 67
End: 2017-05-04
Payer: MEDICARE

## 2017-05-04 ENCOUNTER — LAB VISIT (OUTPATIENT)
Dept: LAB | Facility: HOSPITAL | Age: 67
End: 2017-05-04
Attending: INTERNAL MEDICINE
Payer: MEDICARE

## 2017-05-04 VITALS
OXYGEN SATURATION: 98 % | DIASTOLIC BLOOD PRESSURE: 68 MMHG | HEIGHT: 71 IN | HEART RATE: 114 BPM | SYSTOLIC BLOOD PRESSURE: 112 MMHG | BODY MASS INDEX: 23.92 KG/M2 | WEIGHT: 170.88 LBS

## 2017-05-04 DIAGNOSIS — I25.10 CORONARY ARTERY DISEASE INVOLVING NATIVE CORONARY ARTERY OF NATIVE HEART WITHOUT ANGINA PECTORIS: ICD-10-CM

## 2017-05-04 DIAGNOSIS — Z79.4 INSULIN DEPENDENT TYPE 2 DIABETES MELLITUS, CONTROLLED: ICD-10-CM

## 2017-05-04 DIAGNOSIS — E11.9 INSULIN DEPENDENT TYPE 2 DIABETES MELLITUS, CONTROLLED: ICD-10-CM

## 2017-05-04 DIAGNOSIS — L02.211 ABDOMINAL WALL ABSCESS: ICD-10-CM

## 2017-05-04 DIAGNOSIS — Z99.2 END-STAGE RENAL DISEASE ON HEMODIALYSIS: ICD-10-CM

## 2017-05-04 DIAGNOSIS — Z86.73 HISTORY OF CVA (CEREBROVASCULAR ACCIDENT): ICD-10-CM

## 2017-05-04 DIAGNOSIS — E78.5 HYPERLIPIDEMIA, UNSPECIFIED HYPERLIPIDEMIA TYPE: ICD-10-CM

## 2017-05-04 DIAGNOSIS — I10 ESSENTIAL HYPERTENSION: ICD-10-CM

## 2017-05-04 DIAGNOSIS — N18.6 END-STAGE RENAL DISEASE ON HEMODIALYSIS: ICD-10-CM

## 2017-05-04 DIAGNOSIS — I51.89 DIASTOLIC DYSFUNCTION: ICD-10-CM

## 2017-05-04 DIAGNOSIS — T85.71XS: ICD-10-CM

## 2017-05-04 DIAGNOSIS — T85.71XS: Primary | ICD-10-CM

## 2017-05-04 PROBLEM — R07.9 RIGHT-SIDED CHEST PAIN: Status: RESOLVED | Noted: 2017-02-12 | Resolved: 2017-05-04

## 2017-05-04 PROBLEM — J90 PLEURAL EFFUSION, RIGHT: Status: RESOLVED | Noted: 2017-02-12 | Resolved: 2017-05-04

## 2017-05-04 LAB
ANION GAP SERPL CALC-SCNC: 11 MMOL/L
BASOPHILS # BLD AUTO: 0.03 K/UL
BASOPHILS NFR BLD: 0.3 %
BUN SERPL-MCNC: 13 MG/DL
CALCIUM SERPL-MCNC: 9 MG/DL
CHLORIDE SERPL-SCNC: 97 MMOL/L
CO2 SERPL-SCNC: 31 MMOL/L
CREAT SERPL-MCNC: 3.8 MG/DL
DIFFERENTIAL METHOD: ABNORMAL
EOSINOPHIL # BLD AUTO: 0.4 K/UL
EOSINOPHIL NFR BLD: 4.6 %
ERYTHROCYTE [DISTWIDTH] IN BLOOD BY AUTOMATED COUNT: 14.5 %
EST. GFR  (AFRICAN AMERICAN): 18 ML/MIN/1.73 M^2
EST. GFR  (NON AFRICAN AMERICAN): 15 ML/MIN/1.73 M^2
GLUCOSE SERPL-MCNC: 137 MG/DL
HCT VFR BLD AUTO: 31.5 %
HGB BLD-MCNC: 10 G/DL
LYMPHOCYTES # BLD AUTO: 1.5 K/UL
LYMPHOCYTES NFR BLD: 16.1 %
MCH RBC QN AUTO: 26.9 PG
MCHC RBC AUTO-ENTMCNC: 31.7 %
MCV RBC AUTO: 85 FL
MONOCYTES # BLD AUTO: 1 K/UL
MONOCYTES NFR BLD: 11 %
NEUTROPHILS # BLD AUTO: 6.2 K/UL
NEUTROPHILS NFR BLD: 67.7 %
PLATELET # BLD AUTO: 221 K/UL
PMV BLD AUTO: 9.3 FL
POTASSIUM SERPL-SCNC: 3.8 MMOL/L
RBC # BLD AUTO: 3.72 M/UL
SODIUM SERPL-SCNC: 139 MMOL/L
WBC # BLD AUTO: 9.19 K/UL

## 2017-05-04 PROCEDURE — 99215 OFFICE O/P EST HI 40 MIN: CPT | Mod: S$PBB,,, | Performed by: INTERNAL MEDICINE

## 2017-05-04 PROCEDURE — 36415 COLL VENOUS BLD VENIPUNCTURE: CPT

## 2017-05-04 PROCEDURE — 99999 PR PBB SHADOW E&M-EST. PATIENT-LVL III: CPT | Mod: PBBFAC,,, | Performed by: INTERNAL MEDICINE

## 2017-05-04 PROCEDURE — 80048 BASIC METABOLIC PNL TOTAL CA: CPT

## 2017-05-04 PROCEDURE — 85025 COMPLETE CBC W/AUTO DIFF WBC: CPT

## 2017-05-04 PROCEDURE — 99213 OFFICE O/P EST LOW 20 MIN: CPT | Mod: PBBFAC,PO | Performed by: INTERNAL MEDICINE

## 2017-05-04 RX ORDER — DOXYCYCLINE HYCLATE 100 MG
100 TABLET ORAL 2 TIMES DAILY
Status: ON HOLD
Start: 2017-05-04 | End: 2017-06-21 | Stop reason: HOSPADM

## 2017-05-04 RX ORDER — CARVEDILOL 3.12 MG/1
3.12 TABLET ORAL 2 TIMES DAILY WITH MEALS
Qty: 60 TABLET | Refills: 11 | Status: SHIPPED | OUTPATIENT
Start: 2017-05-04 | End: 2018-05-04

## 2017-05-04 NOTE — MR AVS SNAPSHOT
[facility]  [address]  [place] [address]  Phone: [phone]  Fax: [phone]                  Terry Cote   2017 10:00 AM   Office Visit    Description:  Male : 1950   Provider:  Brianna Church MD   Department:  [place] [facility]           Diagnoses this Visit        Comments    Peritoneal dialysis catheter exit site infection, sequela    -  Primary     Abdominal wall abscess         End-stage renal disease on hemodialysis         Insulin dependent type 2 diabetes mellitus, controlled         Essential hypertension         Diastolic dysfunction         Coronary artery disease involving native coronary artery of native heart without angina pectoris         History of CVA (cerebrovascular accident)         Hyperlipidemia, unspecified hyperlipidemia type                To Do List           Future Appointments        Provider Department Dept Phone    2017 10:00 AM Duncan Rincon MD [facility] [phone]      Goals (5 Years of Data)     None       These Medications        Disp Refills Start End    carvedilol (COREG) 3.125 MG tablet 60 tablet 11 2017    Take 1 tablet (3.125 mg total) by mouth 2 (two) times daily with meals. - Oral    Pharmacy: [facility] [address] [place] [address] [place] [address] [address] [facility] Ph #: [phone]         [facility]     [facility] - 24/ Assistance  Unless otherwise directed by your provider, please contact [facility], our nurse care line that is available for / assistance.     Registered nurses in the [facility] provide: appointment scheduling, clinical advisement, health education, and other advisory services.  Call: [phone] (toll free)               Medications           Message regarding Medications     Verify the changes and/or additions to your medication regime  listed below are the same as discussed with your clinician today.  If any of these changes or additions are incorrect, please notify your healthcare provider.        START taking these NEW medications        Refills    carvedilol (COREG) 3.125 MG tablet 11    Sig: Take 1 tablet (3.125 mg total) by mouth 2 (two) times daily with meals.    Class: Normal    Route: Oral      STOP taking these medications     dextrose 5 % SolP 50 mL with ceftAZIDime 1 gram SolR 1 g Inject 1 g into the vein every 12 (twelve) hours. 1 g of Ceftaz after HD.    VANCOMYCIN HCL (VANCOMYCIN 1 G/250 ML D5W, READY TO MIX SYSTEM,) Inject 250 mLs (1,000 mg total) into the vein daily as needed. 1 g vancomycin after each dialysis session.    insulin glargine (LANTUS SOLOSTAR) 100 unit/mL (3 mL) InPn pen Inject 20 Units into the skin every evening.           Verify that the below list of medications is an accurate representation of the medications you are currently taking.  If none reported, the list may be blank. If incorrect, please contact your healthcare provider. Carry this list with you in case of emergency.           Current Medications     allopurinol (ZYLOPRIM) 100 MG tablet Take 1 tablet (100 mg total) by mouth once daily.    aspirin (ECOTRIN) 81 MG EC tablet Take 1 tablet (81 mg total) by mouth once daily.    carvedilol (COREG) 3.125 MG tablet Take 1 tablet (3.125 mg total) by mouth 2 (two) times daily with meals.    clopidogrel (PLAVIX) 75 mg tablet Take 1 tablet (75 mg total) by mouth once daily.    docusate sodium (COLACE) 100 MG capsule Take 1 capsule (100 mg total) by mouth 2 (two) times daily as needed for Constipation.    EX-LAX, SENNOSIDES, ORAL Take by mouth as needed. As directed    gabapentin (NEURONTIN) 300 MG capsule Take 1 capsule (300 mg total) by mouth every evening.    insulin aspart protamine-insulin aspart (NOVOLOG 70/30) 100 unit/mL (70-30) InPn pen Inject 5 Units into the skin 3 (three) times daily with meals.     "lisinopril 10 MG tablet Take 1 tablet (10 mg total) by mouth once daily.    NYSTOP powder APPLY TO AFFECTED AREA Q 6 H TO SITE    ondansetron (ZOFRAN-ODT) 4 MG TbDL     potassium chloride SA (K-DUR,KLOR-CON) 20 MEQ tablet TK 1 T PO ONCE  D    pravastatin (PRAVACHOL) 80 MG tablet Take 40 mg by mouth once daily.    senna-docusate 8.6-50 mg (PERICOLACE) 8.6-50 mg per tablet Take 1 tablet by mouth 2 (two) times daily.    vitamin renal formula, B-complex-vitamin c-folic acid, (NEPHROCAP) 1 mg Cap Take 1 capsule by mouth once daily.           Clinical Reference Information           Your Vitals Were     BP Pulse Height Weight SpO2 BMI    112/68 (BP Location: Left arm, Patient Position: Sitting, BP Method: Automatic) 114 5' 11" (1.803 m) 77.5 kg (170 lb 13.7 oz) 98% 23.83 kg/m2      Blood Pressure          Most Recent Value    BP  112/68      Allergies as of 5/4/2017     No Known Allergies      Immunizations Administered on Date of Encounter - 5/4/2017     None      Orders Placed During Today's Visit     Future Labs/Procedures Expected by Expires    Basic metabolic panel  4/26/2017 6/25/2018    CBC auto differential  4/26/2017 6/25/2018      Maintenance Dialysis History     Start End Type Comments Center    [facility]            Current Dialysis Center Information     [facility] [address] Phone #:  [phone]    Contact:  [place]  [address] Fax #:  [phone]            Language Assistance Services     ATTENTION: Language assistance services are available, free of charge. Please call [phone].      ATENCIÓN: Si habla español, tiene a moody disposición servicios gratuitos de asistencia lingüística. Llame al [phone].     TAMEKA Ý: N?u b?n nói Ti?ng Vi?t, có các d?ch v? h? tr? ngôn ng? mi?n phí dành cho b?n. G?i s? [phone].         [facility] complies with applicable Federal civil rights laws and does not " discriminate on the basis of race, color, national origin, age, disability, or sex.

## 2017-05-04 NOTE — PROGRESS NOTES
[facility]  New Visit Progress Note   Recent Hospital Discharge     PRESENTING HISTORY     Chief Complaint/Reason for Admission:  Follow up Hospital Discharge   No chief complaint on file.    PCP: Duncan Rincon MD    History of Present Illness:  Mr. Terry Cote is a 67 y.o. male who was recently admitted to the hospital.    Discharge Summaries  Cosign Needed   Gabriela Ayers MD   Internal Medicine      []Hide copied text  [facility] Discharge Summary     Primary Team: [facility]  Attending Physician: No att. providers found  Resident: Ernesto  Intern: Armen     Date of Admit: 4/19/2017  Date of Discharge: 4/23/2017     Discharge to: home  Condition: stable     Discharge Diagnoses          Patient Active Problem List   Diagnosis    Unspecified transient cerebral ischemia    CHF with left ventricular diastolic dysfunction, NYHA class 1    ESRD (end stage renal disease) on dialysis    Diabetes mellitus, type 2    Dyslipidemia associated with type 2 diabetes mellitus    Hypertension associated with diabetes    Hyperuricemia    ESRD on dialysis    Organ transplant candidate    Pre-transplant evaluation for chronic kidney disease    Type 2 diabetes mellitus for many yrs about 25 yrs    Diabetic nephropathy    Secondary hyperparathyroidism    Anemia in chronic kidney disease    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    Neuropathy    DM type 2 causing renal disease    ESRD on peritoneal dialysis    Stroke    (HFpEF) heart failure with preserved ejection fraction    Elevated PSA    Transient alteration of awareness    Type 2 diabetes mellitus with diabetic chronic kidney disease    Vitamin D deficiency    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    Hand muscle atrophy    Cerebral infarct    Pharyngoesophageal dysphagia    Essential hypertension    Coronary artery disease involving native coronary artery of native heart without angina pectoris     Anemia of chronic disease    Syncope    Urinary tract infection without hematuria    Dysphagia    Pleural effusion    Pleural effusion, right    Hyperlipidemia    History of stroke    Right-sided chest pain    Malnutrition    Peritoneal dialysis catheter site infection    Cutaneous abscess of abdominal wall         Consultants and Procedures      Consultants:  [facility]     Procedures:   PD cath removal     Brief History of Present Illness       Terry Cote is a 67 y.o. male who  has a past medical history of Anemia in chronic kidney disease; Arthritis; Coronary artery disease; Diabetes mellitus; Diabetes mellitus, type 2; Diabetic nephropathy (4/22/2015); Diastolic dysfunction; Hypertension; Myocardial infarction (2001); Peritoneal dialysis catheter in place (2014); Secondary hyperparathyroidism; Stroke (2001, 2013, 10/2014); Type 2 diabetes mellitus for many yrs about 25 yrs (4/22/2015); and Unspecified disorder of kidney and ureter.  The patient presented to [facility] on 4/19/2017 with a primary complaint of Hypotension (accompanied by possible wound infection to peritoneal dialyisis site; sent by Dr. Elizalde; denies pain or fever; does dialysis every night)  .      The patient was in their usual state of health until 4 days prior to admission. The patient notes that 4 days prior to admission he began to notices some discharge from the PD catether site. He describes the discharge as yellow and lessening discharge over the last day. He states that he went to [facility] for a check up and was told his blood pressure was low and that he needed to come to the ED. He states that when he washed the area he felt a warm sensation. Denies chest pain, fever, shortness of breath, diarrhea, nausea/vomiting.         For the full HPI please refer to the History & Physical from this admission.     Hospital Course By Problem with Pertinent Findings      Peritoneal catheter infection  -  patient sent from [facility] for evaluation  - nephrology on board, appreciate recs  - ceftazidine via PD  - blood and peritoneal cultures- peritoneal fluid benign   - patient remains afebrile without leukocytosis  - US abdomen: fluid collection  - will consult Dr. Flynn, appreciate recs   - Nephology on board- patient to begin HD, PD catheter removed 4/21; vanc and ceftazidine IV started 4/20- patient will complete treatment with abx at HD; HD facilty contacted, will follow up with nephrology       UTI  - patient with positive UA  - has had pseudomas and VRE in past  - urine culture negative   - ID following, appreciate recs  - asymptomatic, stable at discharge       ESRD  - Cr at baseline per records  - Continued PD nightly with abx; discontinued 4/20  - patient began on HD 4/20, follow up with nephrology as outpatient   - Nephrology consulted, appreciate recs      DM2  - A1c 5.5 2/8/17  - patient taking insulin glargine 20u nightly and aspart 5u with meals  - patients home sugars run in high 100s, stable at discharge       HTN  - patient hypotensive on admission, antihypertensives inititaly held  - restarted medication at discharge       Hypokalemia  - resolved  - will monitor and replete as needed      Hypomagnesemia   - resolved  - will monitor and replete as needed       HFpEF  - discharged on metoprolol but not on home med list   - echo with normal EF and LV diastolic dysfunction  - no acute needs       CAD  - continue ASA, plavix, statin  - no acute issues      H/o Stroke  - continued ASA, plavix, statin       Constipation  - last BM 2 days prior to admission, did not take stool softener for two days  - will continue stool softener, patient had BM      HLD  - continue home statin       Anemia of Chronic Disease  - worked up 11/2016  - likely 2/2 chronic renal disease  - h/h stable   - will monitor     Discharge Medications          Medication List       START taking these medications            dextrose 5 %  SolP 50 mL with ceftAZIDime 1 gram SolR 1 g   Inject 1 g into the vein every 12 (twelve) hours. 1 g of Ceftaz after HD.         VANCOMYCIN 1 G/250 ML D5W (READY TO MIX SYSTEM)   Inject 250 mLs (1,000 mg total) into the vein daily as needed. 1 g vancomycin after each dialysis session.          CHANGE how you take these medications            gabapentin 300 MG capsule   Commonly known as: NEURONTIN   Take 1 capsule (300 mg total) by mouth every evening.   What changed: when to take this         insulin aspart protamine-insulin aspart 100 unit/mL (70-30) Inpn pen   Commonly known as: NovoLOG 70/30   Inject 5 Units into the skin 3 (three) times daily with meals.   What changed: how much to take          CONTINUE taking these medications            allopurinol 100 MG tablet   Commonly known as: ZYLOPRIM   Take 1 tablet (100 mg total) by mouth once daily.         aspirin 81 MG EC tablet   Commonly known as: ECOTRIN   Take 1 tablet (81 mg total) by mouth once daily.         clopidogrel 75 mg tablet   Commonly known as: PLAVIX   Take 1 tablet (75 mg total) by mouth once daily.         docusate sodium 100 MG capsule   Commonly known as: COLACE   Take 1 capsule (100 mg total) by mouth 2 (two) times daily as needed for Constipation.         EX-LAX (SENNOSIDES) ORAL         insulin glargine 100 unit/mL (3 mL) Inpn pen   Commonly known as: LANTUS SOLOSTAR   Inject 20 Units into the skin every evening.         lisinopril 10 MG tablet   Take 1 tablet (10 mg total) by mouth once daily.         NYSTOP powder   Generic drug: nystatin         ondansetron 4 MG Tbdl   Commonly known as: ZOFRAN-ODT         potassium chloride SA 20 MEQ tablet   Commonly known as: K-DUR,KLOR-CON         pravastatin 80 MG tablet   Commonly known as: PRAVACHOL         senna-docusate 8.6-50 mg 8.6-50 mg per tablet   Commonly known as: PERICOLACE   Take 1 tablet by mouth 2 (two) times daily.         vitamin renal formula (B-complex-vitamin c-folic acid) 1 mg  Cap   Commonly known as: NEPHROCAP   Take 1 capsule by mouth once daily.                        Where to Get Your Medications            You can get these medications from any pharmacy           Bring a paper prescription for each of these medications       dextrose 5 % SolP 50 mL with ceftAZIDime 1 gram SolR 1 g    VANCOMYCIN 1 G/250 ML D5W (READY TO MIX SYSTEM)                  Discharge Information:   Diet:  Cardiac renal      Physical Activity:  As tolerated     Instructions:  1. Take all medications as prescribed  2. Keep all follow-up appointments  3. Return to the hospital or call your primary care physicians if any worsening symptoms such as chest pain, shortness of breath, weakness, dizziness, intractable nausea/vomiting/abdominal pain occur.        Follow-Up Appointments:  PCP 1-2 weeks  Nephrology 2 weeks  Sienna Ayers  [facility] Internal [facility], -             ___________________________________________________________________    Today:    [facility]".  Recently hospitalized for ABD Wall abscess secondary to infected peritoneal dialysis catheter.   Underwent I&D of abd wall abscess and removal of PD catheter.  Hemodialysis was initiated via a mature LUE fistula.     Continues on hemodialysis MWF via LUE fistula. Reports fistula is working well.   Was initially receiving IV abx (Vancomycin/Ceftazidine) with hemodialysis, but reports he was changed to a course of oral ABX (doxycycline).  He is still on the doxycycline.  Wound cultures revealed growth of an incompletely identified organism- beaded, Gram Positive Won, Kinyoun Pos stain.   Patient reports he has an upcoming Infectious Disease appt in [place], [place] 5/23/17.    Site of PD catheter removal is healing well. Sutures in place; no discharge, no signs of infection.   Has appt with [facility] (Dr Flynn) 5/11/17 for further evaluation and to consider placing a new PD catheter.   Patient hopeful that he can  resume peritoneal dialysis.   Also has FU scheduled with Nephrology (Dr Hand) 5/15/17.     Patient receiving home health with therapy services- PT & OT.   Mostly wheelchair bound but able to ambulate minimally with rolling walker and assistance.  Able to self transfer for toileting and showering. (has raised toilet seat as well as shower chair)  No falls reported.   Has full time care from family.  Accompanied by wife and mother in law today.     Review of Systems  General ROS: negative for chills, fever or weight loss  Psychological ROS: negative for hallucination, depression or suicidal ideation  Ophthalmic ROS: negative for blurry vision, photophobia or eye pain  ENT ROS: negative for epistaxis, sore throat or rhinorrhea  Respiratory ROS: no cough, shortness of breath, or wheezing  Cardiovascular ROS: no chest pain or dyspnea on exertion  Gastrointestinal ROS: no abdominal pain, change in bowel habits, or black/ bloody stools  Genito-Urinary ROS: no dysuria, trouble voiding, or hematuria +still makes minimal amount of urine  Musculoskeletal ROS: + generalized muscle weakness, + mostly wheelchair bound  Neurological ROS: no syncope or seizures; no ataxia  Dermatological ROS: negative for pruritis, rash and jaundice          PAST HISTORY:     Past Medical History:   Diagnosis Date    Anemia in chronic kidney disease     Arthritis     unable to walk due to arthritis in knees.     Coronary artery disease     Diabetes mellitus     Diabetes mellitus, type 2     Diabetic nephropathy 4/22/2015    Diastolic dysfunction     Hypertension     Myocardial infarction 2001    Peritoneal dialysis catheter in place 2014    Secondary hyperparathyroidism     Stroke 2001, 2013, 10/2014    weak, slurred speech, equal hand grasp/equal leg movements    Type 2 diabetes mellitus for many yrs about 24 yrs 4/22/2015    Unspecified disorder of kidney and ureter        Past Surgical History:   Procedure Laterality Date     AV FISTULA PLACEMENT Left 2011    wrist;    CATARACT EXTRACTION      NECK SURGERY      PERITONEAL CATHETER INSERTION      PORTACATH PLACEMENT  march 2016    left chest    SPINE SURGERY         Family History   Problem Relation Age of Onset    Cancer Mother     Cancer Father        Social History     Social History    Marital status:      Spouse name: N/A    Number of children: N/A    Years of education: N/A     Occupational History          disable     Social History Main Topics    Smoking status: Never Smoker    Smokeless tobacco: Never Used    Alcohol use No    Drug use: No    Sexual activity: Yes     Partners: Female     Other Topics Concern    Not on file     Social History Narrative       MEDICATIONS & ALLERGIES:     Current Outpatient Prescriptions on File Prior to Visit   Medication Sig Dispense Refill    allopurinol (ZYLOPRIM) 100 MG tablet Take 1 tablet (100 mg total) by mouth once daily. 30 tablet 1    aspirin (ECOTRIN) 81 MG EC tablet Take 1 tablet (81 mg total) by mouth once daily. 30 tablet 3    clopidogrel (PLAVIX) 75 mg tablet Take 1 tablet (75 mg total) by mouth once daily. 90 tablet 3    dextrose 5 % SolP 50 mL with ceftAZIDime 1 gram SolR 1 g Inject 1 g into the vein every 12 (twelve) hours. 1 g of Ceftaz after HD. 1 g PRN    docusate sodium (COLACE) 100 MG capsule Take 1 capsule (100 mg total) by mouth 2 (two) times daily as needed for Constipation. 100 capsule 0    EX-LAX, SENNOSIDES, ORAL Take by mouth as needed. As directed      gabapentin (NEURONTIN) 300 MG capsule Take 1 capsule (300 mg total) by mouth every evening. (Patient taking differently: Take 300 mg by mouth once daily. ) 30 capsule 3    insulin aspart protamine-insulin aspart (NOVOLOG 70/30) 100 unit/mL (70-30) InPn pen Inject 5 Units into the skin 3 (three) times daily with meals. (Patient taking differently: Inject 8 Units into the skin 3 (three) times daily with meals. ) 3 mL 11    insulin glargine  "(LANTUS SOLOSTAR) 100 unit/mL (3 mL) InPn pen Inject 20 Units into the skin every evening. 31 Syringe 6    lisinopril 10 MG tablet Take 1 tablet (10 mg total) by mouth once daily. 90 tablet 3    NYSTOP powder APPLY TO AFFECTED AREA Q 6 H TO SITE  0    ondansetron (ZOFRAN-ODT) 4 MG TbDL   1    potassium chloride SA (K-DUR,KLOR-CON) 20 MEQ tablet TK 1 T PO ONCE  D  2    pravastatin (PRAVACHOL) 80 MG tablet Take 40 mg by mouth once daily.      senna-docusate 8.6-50 mg (PERICOLACE) 8.6-50 mg per tablet Take 1 tablet by mouth 2 (two) times daily. 30 tablet 0    VANCOMYCIN HCL (VANCOMYCIN 1 G/250 ML D5W, READY TO MIX SYSTEM,) Inject 250 mLs (1,000 mg total) into the vein daily as needed. 1 g vancomycin after each dialysis session. 250 mL PRN    vitamin renal formula, B-complex-vitamin c-folic acid, (NEPHROCAP) 1 mg Cap Take 1 capsule by mouth once daily. 90 capsule 1     No current facility-administered medications on file prior to visit.         Review of patient's allergies indicates:  No Known Allergies    OBJECTIVE:     Vital Signs:  Vitals:    05/04/17 1000   BP: 112/68   Pulse: (!) 114     Wt Readings from Last 1 Encounters:   04/22/17 1755 82.1 kg (181 lb)     Body mass index is 23.83 kg/(m^2).       Physical Exam:  /68 (BP Location: Left arm, Patient Position: Sitting, BP Method: Automatic)  Pulse (!) 114  Ht 5' 11" (1.803 m)  Wt 77.5 kg (170 lb 13.7 oz)  SpO2 98%  BMI 23.83 kg/m2  General appearance: alert, cooperative, no distress  Constitutional:Oriented to person, place, and time.appears well-developed and well-nourished.   HEENT: Normocephalic, atraumatic, neck symmetrical, no nasal discharge   Eyes: conjunctivae/corneas clear, PERRL, EOM's intact  Lungs: clear to auscultation bilaterally, no dullness to percussion bilaterally  Heart: +increased rate and regular rhythm without rub; no displacement of the PMI   Abdomen: soft, non-tender; bowel sounds normoactive; no organomegaly +sutures in " place (site of PD cath removal) +healing surgical wound  Extremities: extremities symmetric; no clubbing, cyanosis, or edema  Integument: Skin color, texture, turgor normal; no rashes; hair distrubution normal  Neurologic: Alert and oriented X 3  Psychiatric: no pressured speech; normal affect; no evidence of impaired cognition     Laboratory  Lab Results   Component Value Date    WBC 9.19 05/04/2017    HGB 10.0 (L) 05/04/2017    HCT 31.5 (L) 05/04/2017    MCV 85 05/04/2017     05/04/2017     BMP  Lab Results   Component Value Date     05/04/2017    K 3.8 05/04/2017    CL 97 05/04/2017    CO2 31 (H) 05/04/2017    BUN 13 05/04/2017    CREATININE 3.8 (H) 05/04/2017    CALCIUM 9.0 05/04/2017    ANIONGAP 11 05/04/2017    ESTGFRAFRICA 18 (A) 05/04/2017    EGFRNONAA 15 (A) 05/04/2017     Lab Results   Component Value Date    ALT 15 04/22/2017    AST 17 04/22/2017    ALKPHOS 84 04/22/2017    BILITOT 0.6 04/22/2017     Lab Results   Component Value Date    INR 1.0 04/19/2017    INR 1.1 04/17/2017    INR 1.1 02/11/2017     Lab Results   Component Value Date    HGBA1C 5.5 02/08/2017     No results for input(s): POCTGLUCOSE in the last 72 hours.        ASSESSMENT & PLAN:     Peritoneal dialysis catheter exit site infection, sequela  - IV ABX with HD has been discontinued; currently on Doxycycline  - Has upcoming ID appt in [place], [place] (5/23/17)  - Incompletely identified organism from this site - beaded, Gram Positive Won, Kinyoun Pos stain.   - No signs of ongoing infection  -     Basic metabolic panel; Reviewed    Abdominal wall abscess  - resolved with surgical drainage and ABX therapy  -     CBC auto differential; Reviewed    End-stage renal disease on hemodialysis  - Previously on Peritoneal HD until PD catheter removed due to above stated infection  - HD initiated via LUE fistula when PD catheter was removed  - Currently on HD MWF  - Patient hopeful to resume PD- has upcoming surgery appt and will discuss  placing new PD catheter.     Insulin dependent type 2 diabetes mellitus, controlled  - was previously on Lantus QHS with Novolog prn meals.   - Lantus discontinued last hospital stay due to hypoglycemic episodes   - Now on Novolog 70/30 mix- being administered on a sliding scale at mealtime- patient generally getting about 5 U per meal  - Discussed with patient and family that this is not the best or safest use of insulin or management of his diabetes; they are reluctant to change as they feel his blood glucoses are stable at present and they fear future hypoglycemic episodes  - Insulin management issue will need further discussion and education     Essential hypertension  - under good control at present    Diastolic dysfunction  - on ACEI  - no indication for Lasix at this time  -     carvedilol (COREG) 3.125 MG tablet; Take 1 tablet (3.125 mg total) by mouth 2 (two) times daily with meals.  Dispense: 60 tablet; Refill: 11    Coronary artery disease involving native coronary artery of native heart without angina pectoris  - continue Aspirin, Plavix, Statin    History of CVA (cerebrovascular accident)  - continue Aspirin, Plavix, Statin    Hyperlipidemia, unspecified hyperlipidemia type  - continue statin therapy      [facility].  Has routine FU with PCP- (Dr Rincon 6/19/17)  Has FU scheduled with Surgery, Nephrology, ID.   Instructions for the patient:      Scheduled Follow-up :  Future Appointments  Date Time Provider Department Center   5/11/2017 2:35 PM Gabriela Flynn MD [facility]   6/19/2017 10:00 AM Duncan Rincon MD [facility]       Post Visit Medication List:     Medication List          This list is accurate as of: 5/4/17 11:28 AM.  Always use your most recent med list.                     allopurinol 100 MG tablet   Commonly known as:  ZYLOPRIM   Take 1 tablet (100 mg total) by mouth once daily.       aspirin 81 MG EC tablet   Commonly known as:  ECOTRIN    Take 1 tablet (81 mg total) by mouth once daily.       carvedilol 3.125 MG tablet   Commonly known as:  COREG   Take 1 tablet (3.125 mg total) by mouth 2 (two) times daily with meals.       clopidogrel 75 mg tablet   Commonly known as:  PLAVIX   Take 1 tablet (75 mg total) by mouth once daily.       docusate sodium 100 MG capsule   Commonly known as:  COLACE   Take 1 capsule (100 mg total) by mouth 2 (two) times daily as needed for Constipation.       doxycycline 100 MG tablet   Commonly known as:  VIBRA-TABS   Take 1 tablet (100 mg total) by mouth 2 (two) times daily.       EX-LAX (SENNOSIDES) ORAL       gabapentin 300 MG capsule   Commonly known as:  NEURONTIN   Take 1 capsule (300 mg total) by mouth every evening.       insulin aspart protamine-insulin aspart 100 unit/mL (70-30) Inpn pen   Commonly known as:  NovoLOG 70/30   Inject 5 Units into the skin 3 (three) times daily with meals.       lisinopril 10 MG tablet   Take 1 tablet (10 mg total) by mouth once daily.       NYSTOP powder   Generic drug:  nystatin       ondansetron 4 MG Tbdl   Commonly known as:  ZOFRAN-ODT       potassium chloride SA 20 MEQ tablet   Commonly known as:  K-DUR,KLOR-CON       pravastatin 80 MG tablet   Commonly known as:  PRAVACHOL       senna-docusate 8.6-50 mg 8.6-50 mg per tablet   Commonly known as:  PERICOLACE   Take 1 tablet by mouth 2 (two) times daily.       vitamin renal formula (B-complex-vitamin c-folic acid) 1 mg Cap   Commonly known as:  NEPHROCAP   Take 1 capsule by mouth once daily.            Where to Get Your Medications      These medications were sent to [id number] [place] [facility] [address] AT [facility] [place]  [address] [address]    Hours:  24-hours Phone:  [phone]     carvedilol 3.125 MG tablet         Information about where to get these medications is not yet available     ! Ask your nurse or doctor about these medications     doxycycline 100  MG tablet             Signing Physician:  Brianna Church MD

## 2017-05-08 NOTE — PHYSICIAN QUERY
PT Name: Terry Cote  MR #: [id number]    Physician Query Form - Nutrition Clarification     CDS/: Brenda Lara               Contact information: [email or web address]    This form is a permanent document in the medical record.     Query Date: May 8, 2017    By submitting this query, we are merely seeking further clarification of documentation.. Please utilize your independent clinical judgment when addressing the question(s) below.    The Medical record contains the following:   Indicators  Supporting Clinical Findings Location in Medical Record    % of Estimated Energy Intake over a time frame from p.o., TF, or TPN     X Weight Status over a time frame 189 on 4/19 to 181 on discharge Nutritional assessment    Subcutaneous Fat and/or Muscle Loss      Fluid Accumulation or Edema      Reduced  Strength     X Wt / BMI / Usual Body Weight 181 Nutritional assessment    Delayed Wound Healing / Failure to Thrive     X Acute or Chronic Illness ESRD HP, PN, DS    Medication     X Treatment Recommendation/Intervention:   1. Rec add 2000 ADA diet restriction to diet order   2. Boost glucose- chocolate tid   3. RD to monitor      Goals: Patient to meet 85% EEN  Nutrition Goal Status: new  Communication of RD Recs: reviewed with RN        X Other Diagnosis of malnutrition PN, DS     AND / ASPEN Clinical Characteristics (October 2011)  A minimum of two characteristics is recommended for diagnosing either moderate or severe malnutrition   Mild Malnutrition Moderate Malnutrition Severe Malnutrition   Energy Intake from p.o., TF or TPN. < 75% intake of estimated energy needs for less than 7 days < 75% intake of estimated energy needs for greater than 7 days < 50% intake of estimated energy needs for > 5 days   Weight Loss 1-2% in 1 month  5% in 3 months  7.5% in 6 months  10% in 1 year 1-2 % in 1 week  5% in 1 month  7.5% in 3 months  10% in 6 months  20% in 1 year > 2% in 1 week  > 5% in 1 month  > 7.5%  in 3 months  > 10% in 6 months  > 20% in 1 year   Physical Findings     None *Mild subcutaneous fat and/or muscle loss  *Mild fluid accumulation  *Stage II decubitus  *Surgical wound or non-healing wound *Mod/severe subcutaneous fat and/or muscle loss  *Mod/severe fluid accumulation  *Stage III or IV decubitus  *Non-healing surgical wound     Provider, please specify diagnosis or diagnoses associated with above clinical findings.    [ x] Mild Protein-Calorie Malnutrition  [ ] Moderate Protein-Calorie Malnutrition  [ ] Severe Protein-Calorie Malnutrition  [ ] Cachexia  [ ] Anorexia  [ ] Abnormal Weight Loss  [ ] Underweight  [ ] Other Nutritional Diagnosis (please specify): ____________________________________  [ ] Other: ________________________________  [ ] Clinically Undetermined    Please document in your progress notes daily for the duration of treatment until resolved and include in your discharge summary.

## 2017-05-12 ENCOUNTER — TELEPHONE (OUTPATIENT)
Dept: FAMILY MEDICINE | Facility: CLINIC | Age: 67
End: 2017-05-12

## 2017-05-12 NOTE — TELEPHONE ENCOUNTER
Inform pt he can double up on coreg to 6.25 mg twice a day and see if that helps HR and also drink fluids

## 2017-05-12 NOTE — TELEPHONE ENCOUNTER
Spoke to pt and states that his heart 108. Pt is asymptomatic. Pt states that his heart rate usually over 100. Pls advise. [phone]

## 2017-05-15 ENCOUNTER — TELEPHONE (OUTPATIENT)
Dept: PRIMARY CARE CLINIC | Facility: CLINIC | Age: 67
End: 2017-05-15

## 2017-05-15 NOTE — TELEPHONE ENCOUNTER
Call placed to pt to discuss his concerns. Unable to reach pt at this time, LM on VM for him to return call to clinic with PC phone number given.

## 2017-05-15 NOTE — TELEPHONE ENCOUNTER
----- Message from Anabell Ruiz sent at 5/12/2017  3:52 PM CDT -----  Contact: [phone] self  Patient would like a return call from the nurse. He needs to talk to someone about his heart rate.

## 2017-05-23 LAB
AFB SUSCEPTIBILITY, RAPID GROWER: NORMAL
M TB DNA SPEC QL NAA+PROBE: ABNORMAL
SPECIMEN SOURCE AND ORGANISM NAME: NORMAL

## 2017-05-25 LAB
BACTERIA SPEC AEROBE CULT: NORMAL

## 2017-05-30 ENCOUNTER — HOSPITAL ENCOUNTER (OUTPATIENT)
Dept: RADIOLOGY | Facility: HOSPITAL | Age: 67
Discharge: HOME | End: 2017-05-30
Attending: INTERNAL MEDICINE
Payer: MEDICARE

## 2017-05-30 DIAGNOSIS — B96.89: ICD-10-CM

## 2017-05-30 DIAGNOSIS — N18.6 END STAGE RENAL DISEASE: ICD-10-CM

## 2017-05-30 DIAGNOSIS — B96.89: Primary | ICD-10-CM

## 2017-05-30 PROCEDURE — 76999 ECHO EXAMINATION PROCEDURE: CPT | Mod: TC

## 2017-05-30 PROCEDURE — 76705 ECHO EXAM OF ABDOMEN: CPT | Mod: 26,,, | Performed by: RADIOLOGY

## 2017-06-06 ENCOUNTER — LAB VISIT (OUTPATIENT)
Dept: LAB | Facility: HOSPITAL | Age: 67
End: 2017-06-06
Attending: SURGERY
Payer: MEDICARE

## 2017-06-06 ENCOUNTER — OFFICE VISIT (OUTPATIENT)
Dept: SURGERY | Facility: CLINIC | Age: 67
End: 2017-06-06
Payer: MEDICARE

## 2017-06-06 ENCOUNTER — TELEPHONE (OUTPATIENT)
Dept: SURGERY | Facility: CLINIC | Age: 67
End: 2017-06-06

## 2017-06-06 VITALS
WEIGHT: 170 LBS | SYSTOLIC BLOOD PRESSURE: 109 MMHG | HEIGHT: 71 IN | HEART RATE: 96 BPM | DIASTOLIC BLOOD PRESSURE: 53 MMHG | BODY MASS INDEX: 23.8 KG/M2 | TEMPERATURE: 97 F

## 2017-06-06 DIAGNOSIS — N18.6 ESRD ON PERITONEAL DIALYSIS: ICD-10-CM

## 2017-06-06 DIAGNOSIS — A31.9 MYCOBACTERIUM INFECTION, NON-TB: Primary | ICD-10-CM

## 2017-06-06 DIAGNOSIS — L02.211 CUTANEOUS ABSCESS OF ABDOMINAL WALL: ICD-10-CM

## 2017-06-06 DIAGNOSIS — Z99.2 ESRD ON PERITONEAL DIALYSIS: ICD-10-CM

## 2017-06-06 PROCEDURE — 87205 SMEAR GRAM STAIN: CPT

## 2017-06-06 PROCEDURE — 99214 OFFICE O/P EST MOD 30 MIN: CPT | Mod: S$PBB,,, | Performed by: SURGERY

## 2017-06-06 PROCEDURE — 99999 PR PBB SHADOW E&M-EST. PATIENT-LVL IV: CPT | Mod: PBBFAC,,, | Performed by: SURGERY

## 2017-06-06 PROCEDURE — 87075 CULTR BACTERIA EXCEPT BLOOD: CPT

## 2017-06-06 PROCEDURE — 99214 OFFICE O/P EST MOD 30 MIN: CPT | Mod: PBBFAC,PN | Performed by: SURGERY

## 2017-06-06 PROCEDURE — 87070 CULTURE OTHR SPECIMN AEROBIC: CPT

## 2017-06-06 NOTE — TELEPHONE ENCOUNTER
06/06/17   1528  Attempted to contact the pt. No answer. Left voicemail to return call to the office. Will attempt to contact again at a later time.

## 2017-06-06 NOTE — PROGRESS NOTES
History & Physical    SUBJECTIVE:     History of Present Illness:   Patient known to me status post lap PD catheter placement March 2015.  He has a history of staph infection to his peritoneal dialysis catheter and required long-term in March 2016 however this was successfully treated.  He had some incisional hernia subsequent to his abdominal procedures which in November 2016 required mesh repair.  In April 2017 his dialysis catheter was removed by Dr. Flynn due to infection.  Cultures apparently with a resistant Mycobacterium.  Per the notes and per the patient he is not on any long-term antibiotics.  He has an abdominal wall wound that is being covered with gauze and changed daily by his wife.  He is here today for replacement of his peritoneal dialysis catheter.    He has no issues with bowel movements.  No fevers or chills.  He is getting dialysis via a left wrist Celina fistula without any issues.          Chief Complaint   Patient presents with    Post-op Evaluation       Review of patient's allergies indicates:  No Known Allergies    Current Outpatient Prescriptions   Medication Sig Dispense Refill    allopurinol (ZYLOPRIM) 100 MG tablet Take 1 tablet (100 mg total) by mouth once daily. 30 tablet 1    aspirin (ECOTRIN) 81 MG EC tablet Take 1 tablet (81 mg total) by mouth once daily. 30 tablet 3    carvedilol (COREG) 3.125 MG tablet Take 1 tablet (3.125 mg total) by mouth 2 (two) times daily with meals. 60 tablet 11    clopidogrel (PLAVIX) 75 mg tablet Take 1 tablet (75 mg total) by mouth once daily. 90 tablet 3    docusate sodium (COLACE) 100 MG capsule Take 1 capsule (100 mg total) by mouth 2 (two) times daily as needed for Constipation. 100 capsule 0    doxycycline (VIBRA-TABS) 100 MG tablet Take 1 tablet (100 mg total) by mouth 2 (two) times daily.      EX-LAX, SENNOSIDES, ORAL Take by mouth as needed. As directed      gabapentin (NEURONTIN) 300 MG capsule Take 1 capsule (300 mg total) by mouth  every evening. (Patient taking differently: Take 300 mg by mouth once daily. ) 30 capsule 3    insulin aspart protamine-insulin aspart (NOVOLOG 70/30) 100 unit/mL (70-30) InPn pen Inject 5 Units into the skin 3 (three) times daily with meals. (Patient taking differently: Inject 8 Units into the skin 3 (three) times daily with meals. ) 3 mL 11    lisinopril 10 MG tablet Take 1 tablet (10 mg total) by mouth once daily. 90 tablet 3    NYSTOP powder APPLY TO AFFECTED AREA Q 6 H TO SITE  0    ondansetron (ZOFRAN-ODT) 4 MG TbDL   1    potassium chloride SA (K-DUR,KLOR-CON) 20 MEQ tablet TK 1 T PO ONCE  D  2    pravastatin (PRAVACHOL) 80 MG tablet Take 40 mg by mouth once daily.      senna-docusate 8.6-50 mg (PERICOLACE) 8.6-50 mg per tablet Take 1 tablet by mouth 2 (two) times daily. 30 tablet 0    vitamin renal formula, B-complex-vitamin c-folic acid, (NEPHROCAP) 1 mg Cap Take 1 capsule by mouth once daily. 90 capsule 1     No current facility-administered medications for this visit.        Past Medical History:   Diagnosis Date    Anemia in chronic kidney disease     Arthritis     unable to walk due to arthritis in knees.     Coronary artery disease     Diabetes mellitus     Diabetes mellitus, type 2     Diabetic nephropathy 4/22/2015    Diastolic dysfunction     Hypertension     Myocardial infarction 2001    Peritoneal dialysis catheter in place 2014    Secondary hyperparathyroidism     Stroke 2001, 2013, 10/2014    weak, slurred speech, equal hand grasp/equal leg movements    Type 2 diabetes mellitus for many yrs about 25 yrs 4/22/2015    Unspecified disorder of kidney and ureter      Past Surgical History:   Procedure Laterality Date    AV FISTULA PLACEMENT Left 2011    wrist;    CATARACT EXTRACTION      NECK SURGERY      PERITONEAL CATHETER INSERTION      PORTACATH PLACEMENT  march 2016    left chest    SPINE SURGERY       Family History   Problem Relation Age of Onset    Cancer Mother   "   Cancer Father      Social History   Substance Use Topics    Smoking status: Never Smoker    Smokeless tobacco: Never Used    Alcohol use No          Review of Systems   Constitutional: Negative for chills and fever.   HENT: Negative for congestion and sore throat.    Eyes: Negative for photophobia and visual disturbance.   Respiratory: Negative for cough and shortness of breath.    Cardiovascular: Negative for chest pain and palpitations.   Gastrointestinal: Negative for abdominal distention and abdominal pain.   Genitourinary: Negative for dysuria, frequency and hematuria.   Musculoskeletal: Negative for back pain and gait problem.   Skin: Positive for wound. Negative for color change and rash.   Neurological: Negative for seizures and headaches.   Hematological: Negative for adenopathy. Does not bruise/bleed easily.   Psychiatric/Behavioral: Negative for sleep disturbance. The patient is not nervous/anxious.        OBJECTIVE:     Vital Signs (Most Recent)  Temp: 97.2 °F (36.2 °C) (06/06/17 0919)  Pulse: 96 (06/06/17 0919)  BP: (!) 109/53 (06/06/17 0919)  5' 11" (1.803 m)  77.1 kg (170 lb)     Physical Exam   Constitutional: He is oriented to person, place, and time. He appears well-developed and well-nourished. No distress.   HENT:   Head: Normocephalic and atraumatic.   Eyes: Conjunctivae and EOM are normal. No scleral icterus.   Neck: Normal range of motion.   Cardiovascular: Normal rate and intact distal pulses.    Left wrist incision clean dry and intact, fistula plus thrill   Pulmonary/Chest: Effort normal. No stridor. No respiratory distress.   Abdominal: Soft. He exhibits no distension. There is no tenderness.   Umbilical incision healed no evidence of hernia, epigastric incision healed no evidence of hernia    Left lateral catheter exit site with 2 cm opening approximately one and half centimeter depth with friable subcutaneous tissue no cellulitis or purulent discharge   Musculoskeletal: Normal " range of motion. He exhibits no edema or tenderness.   Neurological: He is alert and oriented to person, place, and time.   Skin: No rash noted. He is not diaphoretic. No pallor.   Psychiatric: He has a normal mood and affect. His behavior is normal.       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  Has abdominal wound that is still not healed.  He is getting dialysis via his left wrist fistula without any issues.  Would prefer his abdominal wall wound healed prior to any further implant placement.  Particularly given resistant organism.  A culture was taken today of his wound.  Aquacel Ag packing was given to the patient and local wound care was discussed.  A referral to infectious disease was placed today in order to optimize treatment.  He will return to clinic in 2 weeks.

## 2017-06-06 NOTE — TELEPHONE ENCOUNTER
----- Message from Louisa Zuniga MD sent at 6/6/2017  1:31 PM CDT -----  Can you let patient and wife know I'm setting him up with ID at [facility]    ty    sm    06/06/17    1402  Contacted pt to inform him of ID appt at [facility]. Date and time of appt given. Pt states he's already seen ID in [place] with Dr. Han on [facility], and has a follow up appt on 06/08/17. Pt informed that I would let Dr. Zuniga know. Pt verbalized understanding.

## 2017-06-07 ENCOUNTER — TELEPHONE (OUTPATIENT)
Dept: SURGERY | Facility: CLINIC | Age: 67
End: 2017-06-07

## 2017-06-07 LAB
GRAM STN SPEC: NORMAL
GRAM STN SPEC: NORMAL

## 2017-06-07 NOTE — TELEPHONE ENCOUNTER
06/07/17   1324  Contacted pt regarding going to Infectious Disease at [facility]. Pt spoke to Dr. Zuniga.

## 2017-06-08 ENCOUNTER — OFFICE VISIT (OUTPATIENT)
Dept: INFECTIOUS DISEASES | Facility: CLINIC | Age: 67
End: 2017-06-08
Payer: MEDICARE

## 2017-06-08 VITALS
WEIGHT: 165.38 LBS | BODY MASS INDEX: 23.15 KG/M2 | SYSTOLIC BLOOD PRESSURE: 117 MMHG | HEIGHT: 71 IN | HEART RATE: 92 BPM | TEMPERATURE: 98 F | DIASTOLIC BLOOD PRESSURE: 59 MMHG

## 2017-06-08 DIAGNOSIS — Z99.2 ESRD ON DIALYSIS: ICD-10-CM

## 2017-06-08 DIAGNOSIS — T85.71XA: Primary | ICD-10-CM

## 2017-06-08 DIAGNOSIS — N18.6 ESRD ON DIALYSIS: ICD-10-CM

## 2017-06-08 DIAGNOSIS — Z99.2 TYPE 2 DIABETES MELLITUS WITH CHRONIC KIDNEY DISEASE ON CHRONIC DIALYSIS, WITHOUT LONG-TERM CURRENT USE OF INSULIN: ICD-10-CM

## 2017-06-08 DIAGNOSIS — N18.6 TYPE 2 DIABETES MELLITUS WITH CHRONIC KIDNEY DISEASE ON CHRONIC DIALYSIS, WITHOUT LONG-TERM CURRENT USE OF INSULIN: ICD-10-CM

## 2017-06-08 DIAGNOSIS — E11.22 TYPE 2 DIABETES MELLITUS WITH CHRONIC KIDNEY DISEASE ON CHRONIC DIALYSIS, WITHOUT LONG-TERM CURRENT USE OF INSULIN: ICD-10-CM

## 2017-06-08 DIAGNOSIS — A31.9 MYCOBACTERIUM INFECTION, NON-TB: ICD-10-CM

## 2017-06-08 LAB — BACTERIA SPEC AEROBE CULT: NORMAL

## 2017-06-08 PROCEDURE — 1157F ADVNC CARE PLAN IN RCRD: CPT | Mod: ,,, | Performed by: INTERNAL MEDICINE

## 2017-06-08 PROCEDURE — 87149 DNA/RNA DIRECT PROBE: CPT

## 2017-06-08 PROCEDURE — 87116 MYCOBACTERIA CULTURE: CPT

## 2017-06-08 PROCEDURE — 1159F MED LIST DOCD IN RCRD: CPT | Mod: ,,, | Performed by: INTERNAL MEDICINE

## 2017-06-08 PROCEDURE — 99214 OFFICE O/P EST MOD 30 MIN: CPT | Mod: S$PBB,,, | Performed by: INTERNAL MEDICINE

## 2017-06-08 PROCEDURE — 3044F HG A1C LEVEL LT 7.0%: CPT | Mod: ,,, | Performed by: INTERNAL MEDICINE

## 2017-06-08 PROCEDURE — 99214 OFFICE O/P EST MOD 30 MIN: CPT | Mod: PBBFAC | Performed by: INTERNAL MEDICINE

## 2017-06-08 PROCEDURE — 99999 PR PBB SHADOW E&M-EST. PATIENT-LVL IV: CPT | Mod: PBBFAC,,, | Performed by: INTERNAL MEDICINE

## 2017-06-08 PROCEDURE — 87118 MYCOBACTERIC IDENTIFICATION: CPT

## 2017-06-08 PROCEDURE — 1126F AMNT PAIN NOTED NONE PRSNT: CPT | Mod: ,,, | Performed by: INTERNAL MEDICINE

## 2017-06-08 PROCEDURE — 4010F ACE/ARB THERAPY RXD/TAKEN: CPT | Mod: ,,, | Performed by: INTERNAL MEDICINE

## 2017-06-08 RX ORDER — SULFAMETHOXAZOLE AND TRIMETHOPRIM 400; 80 MG/1; MG/1
TABLET ORAL
Refills: 0 | Status: ON HOLD | COMMUNITY
Start: 2017-05-23 | End: 2017-06-21 | Stop reason: HOSPADM

## 2017-06-08 RX ORDER — FLUCONAZOLE 200 MG/1
TABLET ORAL
Refills: 5 | Status: ON HOLD | COMMUNITY
Start: 2017-05-23 | End: 2017-06-21 | Stop reason: HOSPADM

## 2017-06-08 RX ORDER — AMOXICILLIN AND CLAVULANATE POTASSIUM 500; 125 MG/1; MG/1
TABLET, FILM COATED ORAL
Refills: 0 | Status: ON HOLD | COMMUNITY
Start: 2017-05-23 | End: 2017-06-21 | Stop reason: HOSPADM

## 2017-06-08 NOTE — LETTER
June 8, 2017      Louisa Zuniga MD  [address]  [address]  [facility] [address]           [facility] - Infectious Diseases  [address]  [place] [address]  Phone: [phone]  Fax: [phone]          Patient: Terry Cote   MR Number: [id number]   YOB: 1950   Date of Visit: 6/8/2017       Dear Dr. Louisa Zuniga:    Thank you for referring Terry Cote to me for evaluation. Attached you will find relevant portions of my assessment and plan of care.    If you have questions, please do not hesitate to call me. I look forward to following Terry Cote along with you.    Sincerely,    Danielle Jimenez MD    Enclosure  CC:  No Recipients    If you would like to receive this communication electronically, please contact [email or web address] or [phone] to request more information on [email or web address] Link access.    For providers and/or their staff who would like to refer a patient to [facility], please contact us through our one-stop-shop provider referral line, [facility], at [phone].    If you feel you have received this communication in error or would no longer like to receive these types of communications, please e-mail [email or web address]

## 2017-06-08 NOTE — PROGRESS NOTES
Subjective:      Patient ID: Terry Cote is a 67 y.o. male.    Chief Complaint:No chief complaint on file.      History of Present Illness    Consult from Dr. Zuniga at [facility], history of peritoneal dialysis.  PD cathether removed by Dr. Flynn.  Peritoneal cultures 4/20:    Comments: SOURCE: PERITONEAL FLUID, Peritoneal fld, Mycobacterium sp   CULTURE REFER FOR ID, MYCOBACTERIUM                    FINAL   MYCOBACTERIUM ABSCESSUS GROUP     Clarithromycin incubated for 14 days to test for inducible   resistance (erm gene).   There are no established interpretive guidelines for agents   reported without interpretations.   ----------------------------------------------------------   Organism     MYCOBACTERIUM ABSCESSUS GROUP   Antibiotic    RAMAN (mcg/mL)  Interpretation   ----------------------------------------------------------   Cefoxitin               32       I   Imipenem                 8       I   Ciprofloxacin           >4       R   Moxifloxacin            >8       R   Clarithromycin         >16       R   Amikacin                 8       S   Tobramycin               8       R   Doxycycline            >16       R   Minocycline             >8       R   Tigecycline           0.25   TMP/SMX             >8/152       R   Linezolid               32       R   Clarithromycin: Inducible clarithromycin resistance   detected.   ------------------------------------------------------------   S=SUSCEPTIBLE  I=INTERMEDIATE  R=RESISTANT   N=NONSUSCEPTIBLE  D=SUSCEPTIBLE DOSE DEPENDENT     Has non-healing wound where PD cathether was removed.    U/S:  Impression      Ill-defined heterogeneous echogenicity within the area of clinical concern at site of recent peritoneal dialysis catheter removal.  No evidence for focal collection to suggest significant hematoma or drainable abscess.  Clinical correlation and further evaluation as warranted clinically.         Review of Systems   Constitution: Positive for decreased  appetite and weight loss. Negative for chills, fever, weakness, malaise/fatigue, night sweats and weight gain.   HENT: Negative for congestion, ear pain, headaches, hearing loss, hoarse voice, sore throat and tinnitus.    Eyes: Negative for blurred vision, redness and visual disturbance.   Cardiovascular: Positive for palpitations. Negative for chest pain and leg swelling.   Respiratory: Negative for cough, hemoptysis, shortness of breath and sputum production.    Hematologic/Lymphatic: Negative for adenopathy. Does not bruise/bleed easily.   Skin: Negative for dry skin, itching, rash and suspicious lesions.   Musculoskeletal: Positive for back pain. Negative for joint pain, myalgias and neck pain.   Gastrointestinal: Positive for constipation. Negative for abdominal pain, diarrhea, heartburn, nausea and vomiting.   Genitourinary: Positive for dysuria. Negative for flank pain, frequency, hematuria, hesitancy and urgency.   Neurological: Negative for dizziness, numbness and paresthesias.   Psychiatric/Behavioral: Positive for memory loss. Negative for depression. The patient has insomnia. The patient is not nervous/anxious.      Objective:   Physical Exam   Wheelchair bound, s/p CVA   Open lesion about 2-3cm horizontally with drainage to L of umbilicus, cultured, probes 2-3 cm.  No erythema or exudate.  Assessment:       1. Peritoneal dialysis catheter site infection, initial encounter    2. ESRD on dialysis    3. Mycobacterium infection, non-TB    4. Type 2 diabetes mellitus with chronic kidney disease on chronic dialysis, without long-term current use of insulin          Plan:           Complicated medical issues with MDR M.abscessus. RGM have also been reported as a cause of infection complicating long-term peritoneal dialysis catheters     Have call into Dr. Zuniga for discussion.    Diagnoses and all orders for this visit:    Peritoneal dialysis catheter site infection, initial encounter  -     AFB Culture &  Smear    ESRD on dialysis    Mycobacterium infection, non-TB    Type 2 diabetes mellitus with chronic kidney disease on chronic dialysis, without long-term current use of insulin

## 2017-06-12 ENCOUNTER — TELEPHONE (OUTPATIENT)
Dept: SURGERY | Facility: CLINIC | Age: 67
End: 2017-06-12

## 2017-06-12 ENCOUNTER — TELEPHONE (OUTPATIENT)
Dept: FAMILY MEDICINE | Facility: CLINIC | Age: 67
End: 2017-06-12

## 2017-06-12 LAB — BACTERIA SPEC ANAEROBE CULT: NORMAL

## 2017-06-12 RX ORDER — CYPROHEPTADINE HYDROCHLORIDE 4 MG/1
4 TABLET ORAL 3 TIMES DAILY PRN
Qty: 90 TABLET | Refills: 3 | Status: SHIPPED | OUTPATIENT
Start: 2017-06-12

## 2017-06-12 NOTE — TELEPHONE ENCOUNTER
----- Message from Marifer Murillo sent at 6/12/2017 12:22 PM CDT -----  Contact: [phone]  Pt informing you that he does not have an appetite and he is losing weight rapidly. Pt would like for you to view his medications. Please advise.

## 2017-06-12 NOTE — TELEPHONE ENCOUNTER
i am sorry to hear    He can try Periactin as needed to improve his appetite     rx sent to pharmacy

## 2017-06-12 NOTE — TELEPHONE ENCOUNTER
"----- Message from Hannaamanda Hernandes sent at 6/12/2017 12:13 PM CDT -----  Contact: Self [phone]  Patient is calling to talk to nurse concerning his Incision looks worst and would like to see the doctor sooner he thinks is infected. Please advice     06/12/17    1712  Pt states "my wife states it looks worst and worst everyday. I have an appt on the 19th, could I be seen quickly?" Appt made for 06/14/17 at 9am. Date and time confirmed. Pt verbalized understanding.    "

## 2017-06-14 ENCOUNTER — OFFICE VISIT (OUTPATIENT)
Dept: SURGERY | Facility: CLINIC | Age: 67
End: 2017-06-14
Payer: MEDICARE

## 2017-06-14 VITALS — HEART RATE: 104 BPM | TEMPERATURE: 98 F | DIASTOLIC BLOOD PRESSURE: 56 MMHG | SYSTOLIC BLOOD PRESSURE: 103 MMHG

## 2017-06-14 DIAGNOSIS — A31.9 MYCOBACTERIUM INFECTION, NON-TB: ICD-10-CM

## 2017-06-14 DIAGNOSIS — R10.9 ABDOMINAL PAIN, UNSPECIFIED LOCATION: Primary | ICD-10-CM

## 2017-06-14 DIAGNOSIS — L02.211 CUTANEOUS ABSCESS OF ABDOMINAL WALL: ICD-10-CM

## 2017-06-14 PROCEDURE — 99999 PR PBB SHADOW E&M-EST. PATIENT-LVL III: CPT | Mod: PBBFAC,,, | Performed by: SURGERY

## 2017-06-14 PROCEDURE — 1159F MED LIST DOCD IN RCRD: CPT | Mod: ,,, | Performed by: SURGERY

## 2017-06-14 PROCEDURE — 99213 OFFICE O/P EST LOW 20 MIN: CPT | Mod: PBBFAC,PN | Performed by: SURGERY

## 2017-06-14 PROCEDURE — 1126F AMNT PAIN NOTED NONE PRSNT: CPT | Mod: ,,, | Performed by: SURGERY

## 2017-06-14 PROCEDURE — 99214 OFFICE O/P EST MOD 30 MIN: CPT | Mod: S$PBB,,, | Performed by: SURGERY

## 2017-06-14 PROCEDURE — 1157F ADVNC CARE PLAN IN RCRD: CPT | Mod: ,,, | Performed by: SURGERY

## 2017-06-16 ENCOUNTER — ANESTHESIA EVENT (OUTPATIENT)
Dept: SURGERY | Facility: HOSPITAL | Age: 67
DRG: 901 | End: 2017-06-16
Payer: MEDICARE

## 2017-06-16 ENCOUNTER — HOSPITAL ENCOUNTER (OUTPATIENT)
Dept: RADIOLOGY | Facility: HOSPITAL | Age: 67
Discharge: HOME | DRG: 901 | End: 2017-06-16
Attending: SURGERY
Payer: MEDICARE

## 2017-06-16 ENCOUNTER — TELEPHONE (OUTPATIENT)
Dept: SURGERY | Facility: CLINIC | Age: 67
End: 2017-06-16

## 2017-06-16 DIAGNOSIS — R10.9 ABDOMINAL PAIN, UNSPECIFIED LOCATION: ICD-10-CM

## 2017-06-16 DIAGNOSIS — L02.211 CUTANEOUS ABSCESS OF ABDOMINAL WALL: Primary | ICD-10-CM

## 2017-06-16 PROCEDURE — 74176 CT ABD & PELVIS W/O CONTRAST: CPT | Mod: 26,,, | Performed by: RADIOLOGY

## 2017-06-16 NOTE — PROGRESS NOTES
History & Physical    SUBJECTIVE:     History of Present Illness:  Interval follow up, wound check  + culture for mycobacterium           Patient known to me status post lap PD catheter placement March 2015.  He has a history of staph infection to his peritoneal dialysis catheter and required long-term in March 2016 however this was successfully treated.  He had some incisional hernia subsequent to his abdominal procedures which in November 2016 required mesh repair.  In April 2017 his dialysis catheter was removed by Dr. Flynn due to infection.  Cultures apparently with a resistant Mycobacterium.  Per the notes and per the patient he is not on any long-term antibiotics.  He has an abdominal wall wound that is being covered with gauze and changed daily by his wife.  He is here today for replacement of his peritoneal dialysis catheter.    He has no issues with bowel movements.  No fevers or chills.  He is getting dialysis via a left wrist Celina fistula without any issues.          Chief Complaint   Patient presents with    Wound Infection    Wound Check       Review of patient's allergies indicates:  No Known Allergies    Current Outpatient Prescriptions   Medication Sig Dispense Refill    allopurinol (ZYLOPRIM) 100 MG tablet Take 1 tablet (100 mg total) by mouth once daily. 30 tablet 1    amoxicillin-clavulanate 500-125mg (AUGMENTIN) 500-125 mg Tab   0    aspirin (ECOTRIN) 81 MG EC tablet Take 1 tablet (81 mg total) by mouth once daily. 30 tablet 3    carvedilol (COREG) 3.125 MG tablet Take 1 tablet (3.125 mg total) by mouth 2 (two) times daily with meals. 60 tablet 11    clopidogrel (PLAVIX) 75 mg tablet Take 1 tablet (75 mg total) by mouth once daily. 90 tablet 3    cyproheptadine (PERIACTIN) 4 mg tablet Take 1 tablet (4 mg total) by mouth 3 (three) times daily as needed. 90 tablet 3    docusate sodium (COLACE) 100 MG capsule Take 1 capsule (100 mg total) by mouth 2 (two) times daily as needed for  Constipation. 100 capsule 0    doxycycline (VIBRA-TABS) 100 MG tablet Take 1 tablet (100 mg total) by mouth 2 (two) times daily.      EX-LAX, SENNOSIDES, ORAL Take by mouth as needed. As directed      fluconazole (DIFLUCAN) 200 MG Tab TK 1 T PO QD  5    gabapentin (NEURONTIN) 300 MG capsule Take 1 capsule (300 mg total) by mouth every evening. (Patient taking differently: Take 300 mg by mouth once daily. ) 30 capsule 3    insulin aspart protamine-insulin aspart (NOVOLOG 70/30) 100 unit/mL (70-30) InPn pen Inject 5 Units into the skin 3 (three) times daily with meals. (Patient taking differently: Inject 8 Units into the skin 3 (three) times daily with meals. ) 3 mL 11    lisinopril 10 MG tablet Take 1 tablet (10 mg total) by mouth once daily. 90 tablet 3    NYSTOP powder APPLY TO AFFECTED AREA Q 6 H TO SITE  0    ondansetron (ZOFRAN-ODT) 4 MG TbDL   1    potassium chloride SA (K-DUR,KLOR-CON) 20 MEQ tablet TK 1 T PO ONCE  D  2    pravastatin (PRAVACHOL) 80 MG tablet Take 40 mg by mouth once daily.      senna-docusate 8.6-50 mg (PERICOLACE) 8.6-50 mg per tablet Take 1 tablet by mouth 2 (two) times daily. 30 tablet 0    sulfamethoxazole-trimethoprim 400-80mg (BACTRIM,SEPTRA) 400-80 mg per tablet TK 1 T PO  D  0    vitamin renal formula, B-complex-vitamin c-folic acid, (NEPHROCAP) 1 mg Cap Take 1 capsule by mouth once daily. 90 capsule 1     No current facility-administered medications for this visit.        Past Medical History:   Diagnosis Date    Anemia in chronic kidney disease     Arthritis     unable to walk due to arthritis in knees.     Cataract     Coronary artery disease     Diabetes mellitus     Diabetes mellitus, type 2     Diabetic nephropathy 4/22/2015    Diastolic dysfunction     Hypertension     Myocardial infarction 2001    Peritoneal dialysis catheter in place 2014    Secondary hyperparathyroidism     Stroke 2001, 2013, 10/2014    weak, slurred speech, equal hand grasp/equal  leg movements    Type 2 diabetes mellitus for many yrs about 25 yrs 4/22/2015    Unspecified disorder of kidney and ureter      Past Surgical History:   Procedure Laterality Date    AV FISTULA PLACEMENT Left 2011    wrist;    CATARACT EXTRACTION      EYE SURGERY      HERNIA REPAIR      NECK SURGERY      PERITONEAL CATHETER INSERTION      PORTACATH PLACEMENT  march 2016    left chest    SPINE SURGERY       Family History   Problem Relation Age of Onset    Cancer Mother     Cancer Father      Social History   Substance Use Topics    Smoking status: Never Smoker    Smokeless tobacco: Never Used    Alcohol use No          Review of Systems   Constitutional: Negative for chills and fever.   HENT: Negative for congestion and sore throat.    Eyes: Negative for photophobia and visual disturbance.   Respiratory: Negative for cough and shortness of breath.    Cardiovascular: Negative for chest pain and palpitations.   Gastrointestinal: Negative for abdominal distention and abdominal pain.   Genitourinary: Negative for dysuria, frequency and hematuria.   Musculoskeletal: Negative for back pain and gait problem.   Skin: Positive for wound. Negative for color change and rash.   Neurological: Negative for seizures and headaches.   Hematological: Negative for adenopathy. Does not bruise/bleed easily.   Psychiatric/Behavioral: Negative for sleep disturbance. The patient is not nervous/anxious.        OBJECTIVE:     Vital Signs (Most Recent)  Temp: 98.3 °F (36.8 °C) (06/14/17 0902)  Pulse: 104 (06/14/17 0902)  BP: (!) 103/56 (06/14/17 0902)           Physical Exam   Constitutional: He is oriented to person, place, and time. He appears well-developed and well-nourished. No distress.   HENT:   Head: Normocephalic and atraumatic.   Eyes: Conjunctivae and EOM are normal. No scleral icterus.   Neck: Normal range of motion.   Cardiovascular: Normal rate and intact distal pulses.    Left wrist incision clean dry and intact,  fistula plus thrill   Pulmonary/Chest: Effort normal. No stridor. No respiratory distress.   Abdominal: Soft. He exhibits no distension. There is no tenderness.   Umbilical incision healed no evidence of hernia, epigastric incision healed no evidence of hernia    Left lateral catheter exit site with 2 cm opening approximately one and half centimeter depth with friable subcutaneous tissue no cellulitis or purulent discharge   Musculoskeletal: Normal range of motion. He exhibits no edema or tenderness.   Neurological: He is alert and oriented to person, place, and time.   Skin: No rash noted. He is not diaphoretic. No pallor.   Psychiatric: He has a normal mood and affect. His behavior is normal.       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  Has abdominal wound that is still not healed and this contains mycobacterium.   He is getting dialysis via his left wrist fistula without any issues.   In conjunction with ID will need ct abd pelvis to delineate extent of infection  Will plan debridement, admission for hickmann placement nad initiation of abx to complete at home.

## 2017-06-16 NOTE — TELEPHONE ENCOUNTER
Just book the debridement abdominal wall portion on 6/19 (MAC anesthesia, 2nd case), I will have to place line at a later time once we see extent of infection.

## 2017-06-16 NOTE — TELEPHONE ENCOUNTER
----- Message from Louisa Zuniga MD sent at 6/16/2017 10:56 AM CDT -----  His CT shows possible infection to muscle level. Can you call and arrange I&D abdominal wall and insertion christensen catheter, I spoke with his ID doctor who wants him admitted after surgery to arrange long term antibiotics. We should aim for Monday 2nd case.    06/16/17    1350  Spoke to patient, and informed him of above message. Pt verbalized understanding, and agrees to plan and  surgery date of 06/19/17.

## 2017-06-16 NOTE — TELEPHONE ENCOUNTER
----- Message from Madhuri Geiger sent at 6/16/2017  2:45 PM CDT -----  Contact: Self/ [phone]  Patient would like to speak with you about a procedure he was not sure about. Please advise.    06/16/17   1530  Spoke to pt who asked what will be done on Monday. Pt informed that he would have an incision and drainage of an abdominal abscess, and will be admitted to the hospital to received antibiotics. Pt verbalized understanding.

## 2017-06-19 ENCOUNTER — ANESTHESIA (OUTPATIENT)
Dept: SURGERY | Facility: HOSPITAL | Age: 67
DRG: 901 | End: 2017-06-19
Payer: MEDICARE

## 2017-06-19 ENCOUNTER — HOSPITAL ENCOUNTER (INPATIENT)
Facility: HOSPITAL | Age: 67
LOS: 2 days | Discharge: NURSING, REHAB OR LONG-TERM CARE | DRG: 901 | End: 2017-06-21
Attending: SURGERY | Admitting: SURGERY
Payer: MEDICARE

## 2017-06-19 DIAGNOSIS — A31.8 MYCOBACTERIUM ABSCESSUS INFECTION: ICD-10-CM

## 2017-06-19 DIAGNOSIS — T85.71XD PERITONEAL DIALYSIS CATHETER EXIT SITE INFECTION, SUBSEQUENT ENCOUNTER: ICD-10-CM

## 2017-06-19 DIAGNOSIS — A31.9 MYCOBACTERIUM INFECTION, NON-TB: Primary | ICD-10-CM

## 2017-06-19 LAB
ANION GAP SERPL CALC-SCNC: 9 MMOL/L
BUN SERPL-MCNC: 25 MG/DL
CALCIUM SERPL-MCNC: 9.3 MG/DL
CHLORIDE SERPL-SCNC: 102 MMOL/L
CO2 SERPL-SCNC: 28 MMOL/L
CREAT SERPL-MCNC: 5.3 MG/DL
EST. GFR  (AFRICAN AMERICAN): 12 ML/MIN/1.73 M^2
EST. GFR  (NON AFRICAN AMERICAN): 10 ML/MIN/1.73 M^2
GLUCOSE SERPL-MCNC: 168 MG/DL
GRAM STN SPEC: NORMAL
GRAM STN SPEC: NORMAL
POCT GLUCOSE: 162 MG/DL (ref 70–110)
POCT GLUCOSE: 218 MG/DL (ref 70–110)
POCT GLUCOSE: 222 MG/DL (ref 70–110)
POTASSIUM SERPL-SCNC: 4.2 MMOL/L
SODIUM SERPL-SCNC: 139 MMOL/L

## 2017-06-19 PROCEDURE — 11000001 HC ACUTE MED/SURG PRIVATE ROOM

## 2017-06-19 PROCEDURE — 36000705 HC OR TIME LEV I EA ADD 15 MIN: Performed by: SURGERY

## 2017-06-19 PROCEDURE — 82962 GLUCOSE BLOOD TEST: CPT | Performed by: SURGERY

## 2017-06-19 PROCEDURE — 86706 HEP B SURFACE ANTIBODY: CPT

## 2017-06-19 PROCEDURE — 87186 SC STD MICRODIL/AGAR DIL: CPT

## 2017-06-19 PROCEDURE — 36000704 HC OR TIME LEV I 1ST 15 MIN: Performed by: SURGERY

## 2017-06-19 PROCEDURE — 25000003 PHARM REV CODE 250: Performed by: SURGERY

## 2017-06-19 PROCEDURE — 63600175 PHARM REV CODE 636 W HCPCS: Performed by: NURSE ANESTHETIST, CERTIFIED REGISTERED

## 2017-06-19 PROCEDURE — 80100016 HC MAINTENANCE HEMODIALYSIS

## 2017-06-19 PROCEDURE — 87205 SMEAR GRAM STAIN: CPT

## 2017-06-19 PROCEDURE — 86705 HEP B CORE ANTIBODY IGM: CPT

## 2017-06-19 PROCEDURE — 36415 COLL VENOUS BLD VENIPUNCTURE: CPT

## 2017-06-19 PROCEDURE — 63600175 PHARM REV CODE 636 W HCPCS: Performed by: SURGERY

## 2017-06-19 PROCEDURE — 87070 CULTURE OTHR SPECIMN AEROBIC: CPT

## 2017-06-19 PROCEDURE — 37000009 HC ANESTHESIA EA ADD 15 MINS: Performed by: SURGERY

## 2017-06-19 PROCEDURE — 87118 MYCOBACTERIC IDENTIFICATION: CPT

## 2017-06-19 PROCEDURE — 87116 MYCOBACTERIA CULTURE: CPT

## 2017-06-19 PROCEDURE — 71000033 HC RECOVERY, INTIAL HOUR: Performed by: SURGERY

## 2017-06-19 PROCEDURE — 37000008 HC ANESTHESIA 1ST 15 MINUTES: Performed by: SURGERY

## 2017-06-19 PROCEDURE — 80048 BASIC METABOLIC PNL TOTAL CA: CPT

## 2017-06-19 PROCEDURE — 87075 CULTR BACTERIA EXCEPT BLOOD: CPT

## 2017-06-19 PROCEDURE — 71000039 HC RECOVERY, EACH ADD'L HOUR: Performed by: SURGERY

## 2017-06-19 PROCEDURE — 87340 HEPATITIS B SURFACE AG IA: CPT

## 2017-06-19 PROCEDURE — 25000003 PHARM REV CODE 250: Performed by: NURSE ANESTHETIST, CERTIFIED REGISTERED

## 2017-06-19 PROCEDURE — 87102 FUNGUS ISOLATION CULTURE: CPT

## 2017-06-19 RX ORDER — CARVEDILOL 3.12 MG/1
3.12 TABLET ORAL 2 TIMES DAILY WITH MEALS
Status: DISCONTINUED | OUTPATIENT
Start: 2017-06-19 | End: 2017-06-21 | Stop reason: HOSPADM

## 2017-06-19 RX ORDER — SODIUM CHLORIDE 9 MG/ML
INJECTION, SOLUTION INTRAVENOUS CONTINUOUS
Status: DISCONTINUED | OUTPATIENT
Start: 2017-06-19 | End: 2017-06-19

## 2017-06-19 RX ORDER — HEPARIN SODIUM 5000 [USP'U]/ML
5000 INJECTION, SOLUTION INTRAVENOUS; SUBCUTANEOUS
Status: COMPLETED | OUTPATIENT
Start: 2017-06-19 | End: 2017-06-19

## 2017-06-19 RX ORDER — CLOPIDOGREL BISULFATE 75 MG/1
75 TABLET ORAL DAILY
Status: DISCONTINUED | OUTPATIENT
Start: 2017-06-19 | End: 2017-06-21 | Stop reason: HOSPADM

## 2017-06-19 RX ORDER — HEPARIN SODIUM 5000 [USP'U]/ML
5000 INJECTION, SOLUTION INTRAVENOUS; SUBCUTANEOUS EVERY 8 HOURS
Status: DISCONTINUED | OUTPATIENT
Start: 2017-06-19 | End: 2017-06-19

## 2017-06-19 RX ORDER — HYDROCODONE BITARTRATE AND ACETAMINOPHEN 10; 325 MG/1; MG/1
1 TABLET ORAL EVERY 4 HOURS PRN
Status: DISCONTINUED | OUTPATIENT
Start: 2017-06-19 | End: 2017-06-21 | Stop reason: HOSPADM

## 2017-06-19 RX ORDER — LIDOCAINE HYDROCHLORIDE 10 MG/ML
INJECTION, SOLUTION EPIDURAL; INFILTRATION; INTRACAUDAL; PERINEURAL
Status: DISCONTINUED | OUTPATIENT
Start: 2017-06-19 | End: 2017-06-19 | Stop reason: HOSPADM

## 2017-06-19 RX ORDER — ALLOPURINOL 100 MG/1
100 TABLET ORAL DAILY
Status: DISCONTINUED | OUTPATIENT
Start: 2017-06-19 | End: 2017-06-21 | Stop reason: HOSPADM

## 2017-06-19 RX ORDER — HYDROMORPHONE HYDROCHLORIDE 2 MG/ML
0.5 INJECTION, SOLUTION INTRAMUSCULAR; INTRAVENOUS; SUBCUTANEOUS EVERY 5 MIN PRN
Status: DISCONTINUED | OUTPATIENT
Start: 2017-06-19 | End: 2017-06-19 | Stop reason: HOSPADM

## 2017-06-19 RX ORDER — SODIUM CHLORIDE 0.9 % (FLUSH) 0.9 %
3 SYRINGE (ML) INJECTION
Status: DISCONTINUED | OUTPATIENT
Start: 2017-06-19 | End: 2017-06-21 | Stop reason: HOSPADM

## 2017-06-19 RX ORDER — BUPIVACAINE HYDROCHLORIDE 2.5 MG/ML
INJECTION, SOLUTION EPIDURAL; INFILTRATION; INTRACAUDAL
Status: DISCONTINUED | OUTPATIENT
Start: 2017-06-19 | End: 2017-06-19 | Stop reason: HOSPADM

## 2017-06-19 RX ORDER — HYDROCODONE BITARTRATE AND ACETAMINOPHEN 5; 325 MG/1; MG/1
1 TABLET ORAL EVERY 4 HOURS PRN
Status: DISCONTINUED | OUTPATIENT
Start: 2017-06-19 | End: 2017-06-21 | Stop reason: HOSPADM

## 2017-06-19 RX ORDER — PROPOFOL 10 MG/ML
VIAL (ML) INTRAVENOUS
Status: DISCONTINUED | OUTPATIENT
Start: 2017-06-19 | End: 2017-06-19

## 2017-06-19 RX ORDER — SODIUM CHLORIDE 0.9 % (FLUSH) 0.9 %
3 SYRINGE (ML) INJECTION EVERY 8 HOURS
Status: DISCONTINUED | OUTPATIENT
Start: 2017-06-19 | End: 2017-06-19 | Stop reason: HOSPADM

## 2017-06-19 RX ORDER — PROPOFOL 10 MG/ML
VIAL (ML) INTRAVENOUS CONTINUOUS PRN
Status: DISCONTINUED | OUTPATIENT
Start: 2017-06-19 | End: 2017-06-19

## 2017-06-19 RX ORDER — SODIUM CHLORIDE 9 MG/ML
INJECTION, SOLUTION INTRAVENOUS ONCE
Status: DISCONTINUED | OUTPATIENT
Start: 2017-06-19 | End: 2017-06-21 | Stop reason: HOSPADM

## 2017-06-19 RX ORDER — GABAPENTIN 300 MG/1
300 CAPSULE ORAL DAILY
Status: DISCONTINUED | OUTPATIENT
Start: 2017-06-19 | End: 2017-06-21 | Stop reason: HOSPADM

## 2017-06-19 RX ORDER — INSULIN ASPART 100 [IU]/ML
8 INJECTION, SUSPENSION SUBCUTANEOUS
Status: DISCONTINUED | OUTPATIENT
Start: 2017-06-19 | End: 2017-06-21 | Stop reason: HOSPADM

## 2017-06-19 RX ORDER — KETAMINE HYDROCHLORIDE 50 MG/ML
INJECTION, SOLUTION INTRAMUSCULAR; INTRAVENOUS
Status: DISCONTINUED | OUTPATIENT
Start: 2017-06-19 | End: 2017-06-19

## 2017-06-19 RX ORDER — SODIUM CHLORIDE 9 MG/ML
INJECTION, SOLUTION INTRAVENOUS
Status: DISCONTINUED | OUTPATIENT
Start: 2017-06-19 | End: 2017-06-21 | Stop reason: HOSPADM

## 2017-06-19 RX ORDER — LISINOPRIL 10 MG/1
10 TABLET ORAL DAILY
Status: DISCONTINUED | OUTPATIENT
Start: 2017-06-19 | End: 2017-06-21 | Stop reason: HOSPADM

## 2017-06-19 RX ORDER — ASPIRIN 81 MG/1
81 TABLET ORAL DAILY
Status: DISCONTINUED | OUTPATIENT
Start: 2017-06-19 | End: 2017-06-21 | Stop reason: HOSPADM

## 2017-06-19 RX ORDER — PRAVASTATIN SODIUM 40 MG/1
40 TABLET ORAL DAILY
Status: DISCONTINUED | OUTPATIENT
Start: 2017-06-19 | End: 2017-06-21 | Stop reason: HOSPADM

## 2017-06-19 RX ORDER — ONDANSETRON 2 MG/ML
4 INJECTION INTRAMUSCULAR; INTRAVENOUS ONCE AS NEEDED
Status: DISCONTINUED | OUTPATIENT
Start: 2017-06-19 | End: 2017-06-19 | Stop reason: HOSPADM

## 2017-06-19 RX ORDER — HYDROMORPHONE HYDROCHLORIDE 1 MG/ML
1 INJECTION, SOLUTION INTRAMUSCULAR; INTRAVENOUS; SUBCUTANEOUS
Status: DISCONTINUED | OUTPATIENT
Start: 2017-06-19 | End: 2017-06-21 | Stop reason: HOSPADM

## 2017-06-19 RX ORDER — AMOXICILLIN 250 MG
1 CAPSULE ORAL 2 TIMES DAILY
Status: DISCONTINUED | OUTPATIENT
Start: 2017-06-19 | End: 2017-06-21 | Stop reason: HOSPADM

## 2017-06-19 RX ORDER — GLYCOPYRROLATE 0.2 MG/ML
INJECTION INTRAMUSCULAR; INTRAVENOUS
Status: DISCONTINUED | OUTPATIENT
Start: 2017-06-19 | End: 2017-06-19

## 2017-06-19 RX ADMIN — CARVEDILOL 3.12 MG: 3.12 TABLET, FILM COATED ORAL at 06:06

## 2017-06-19 RX ADMIN — HEPARIN SODIUM 5000 UNITS: 5000 INJECTION, SOLUTION INTRAVENOUS; SUBCUTANEOUS at 09:06

## 2017-06-19 RX ADMIN — ALLOPURINOL 100 MG: 100 TABLET ORAL at 06:06

## 2017-06-19 RX ADMIN — CLOPIDOGREL BISULFATE 75 MG: 75 TABLET ORAL at 06:06

## 2017-06-19 RX ADMIN — SODIUM CHLORIDE: 0.9 INJECTION, SOLUTION INTRAVENOUS at 09:06

## 2017-06-19 RX ADMIN — PROPOFOL 100 MCG/KG/MIN: 10 INJECTION, EMULSION INTRAVENOUS at 10:06

## 2017-06-19 RX ADMIN — KETAMINE HYDROCHLORIDE 10 MG: 50 INJECTION, SOLUTION, CONCENTRATE INTRAMUSCULAR; INTRAVENOUS at 10:06

## 2017-06-19 RX ADMIN — PROPOFOL 30 MG: 10 INJECTION, EMULSION INTRAVENOUS at 10:06

## 2017-06-19 RX ADMIN — GLYCOPYRROLATE 0.2 MG: 0.2 INJECTION, SOLUTION INTRAMUSCULAR; INTRAVENOUS at 10:06

## 2017-06-19 RX ADMIN — Medication 1 CAPSULE: at 06:06

## 2017-06-19 RX ADMIN — ASPIRIN 81 MG: 81 TABLET, COATED ORAL at 06:06

## 2017-06-19 RX ADMIN — INSULIN ASPART 8 UNITS: 100 INJECTION, SUSPENSION SUBCUTANEOUS at 06:06

## 2017-06-19 NOTE — ANESTHESIA PREPROCEDURE EVALUATION
06/19/2017  Terry Cote is a 67 y.o., male for I & D of abdominal wall.    Review of patient's allergies indicates:  No Known Allergies    Past Medical History:   Diagnosis Date    Anemia in chronic kidney disease     Arthritis     unable to walk due to arthritis in knees.     Cataract     Coronary artery disease     Diabetes mellitus     Diabetes mellitus, type 2     Diabetic nephropathy 4/22/2015    Diastolic dysfunction     Hypertension     Myocardial infarction 2001    Peritoneal dialysis catheter in place 2014    Secondary hyperparathyroidism     Stroke 2001, 2013, 10/2014    weak, slurred speech, equal hand grasp/equal leg movements    Type 2 diabetes mellitus for many yrs about 25 yrs 4/22/2015    Unspecified disorder of kidney and ureter      Past Surgical History:   Procedure Laterality Date    AV FISTULA PLACEMENT Left 2011    wrist;    CATARACT EXTRACTION      EYE SURGERY      HERNIA REPAIR      NECK SURGERY      PERITONEAL CATHETER INSERTION      PORTACATH PLACEMENT  march 2016    left chest    SPINE SURGERY       Patient Active Problem List   Diagnosis    Unspecified transient cerebral ischemia    CHF with left ventricular diastolic dysfunction, NYHA class 1    Diabetes mellitus, type 2    Dyslipidemia associated with type 2 diabetes mellitus    Hyperuricemia    ESRD on dialysis    Organ transplant candidate    Pre-transplant evaluation for chronic kidney disease    Type 2 diabetes mellitus for many yrs about 25 yrs    Diabetic nephropathy    Secondary hyperparathyroidism    Anemia in chronic kidney disease    Neuropathy    Stroke    Elevated PSA    Type 2 diabetes mellitus with chronic kidney disease on chronic dialysis, without long-term current use of insulin    Vitamin D deficiency    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    Hand  muscle atrophy    Weakness    Pharyngoesophageal dysphagia    Essential hypertension    Coronary artery disease involving native coronary artery of native heart without angina pectoris    Dysphagia    Hyperlipidemia    History of stroke    Malnutrition    Peritoneal dialysis catheter site infection    Cutaneous abscess of abdominal wall    Mycobacterium infection, non-TB         Pre-op Assessment         Review of Systems  Renal/:  Kidney Function/Disease  Dialysis Dependent, M-W-F , last dialysis was 6/16/17     Wt Readings from Last 3 Encounters:   06/08/17 75 kg (165 lb 5.5 oz)   06/06/17 77.1 kg (170 lb)   05/25/17 77.1 kg (170 lb)     Temp Readings from Last 3 Encounters:   06/14/17 36.8 °C (98.3 °F) (Oral)   06/08/17 36.6 °C (97.8 °F) (Oral)   06/06/17 36.2 °C (97.2 °F)     BP Readings from Last 3 Encounters:   06/14/17 (!) 103/56   06/08/17 (!) 117/59   06/06/17 (!) 109/53     Pulse Readings from Last 3 Encounters:   06/14/17 104   06/08/17 92   06/06/17 96     Lab Results   Component Value Date    WBC 9.19 05/04/2017    HGB 10.0 (L) 05/04/2017    HCT 31.5 (L) 05/04/2017    MCV 85 05/04/2017     05/04/2017     Normal sinus rhythm  Low voltage QRS  Cannot rule out Anterior infarct (cited on or before 06-NOV-2015)  Abnormal ECG  When compared with ECG of 19-APR-2017 11:43,  Borderline criteria for Inferior infarct are no longer Present  Confirmed by Truong RAMIREZ, [facility] ([id number]) on 4/21/2017 10:12:25 AM    CONCLUSIONS     1 - Normal left ventricular systolic function (EF 55-60%).     2 - Left ventricular diastolic dysfunction.     3 - Normal right ventricular systolic function .   This document has been electronically    SIGNED BY: Sariah Simon MD On: 01/04/2016 13:57      Physical Exam  General:  Well nourished    Airway/Jaw/Neck:  Airway Findings: Mouth Opening: Normal Tongue: Normal  General Airway Assessment: Adult  Mallampati: II  Improves to II with phonation.  TM Distance: Normal,  at least 6 cm       Chest/Lungs:  Chest/Lungs Findings: Clear to auscultation, Normal Respiratory Rate         Mental Status:  Mental Status Findings:  Cooperative, Alert and Oriented         Anesthesia Plan  Type of Anesthesia, risks & benefits discussed:  Anesthesia Type:  MAC  Patient's Preference:   Intra-op Monitoring Plan:   Intra-op Monitoring Plan Comments:   Post Op Pain Control Plan:   Post Op Pain Control Plan Comments:   Induction:   IV  Beta Blocker:         Informed Consent: Patient understands risks and agrees with Anesthesia plan.  Questions answered. Anesthesia consent signed with patient.  ASA Score: 4     Day of Surgery Review of History & Physical: I have interviewed and examined the patient. I have reviewed the patient's H&P dated:  There are no significant changes.  H&P update referred to the provider.  H&P completed by Anesthesiologist.       Ready For Surgery From Anesthesia Perspective.

## 2017-06-19 NOTE — CONSULTS
NEPHROLOGY CONSULT NOTE    HPI & INTERVAL HISTORY:    Past Medical History:   Diagnosis Date    Anemia in chronic kidney disease     Arthritis     unable to walk due to arthritis in knees.     Cataract     Coronary artery disease     Diabetes mellitus     Diabetes mellitus, type 2     Diabetic nephropathy 4/22/2015    Diastolic dysfunction     Hypertension     Myocardial infarction 2001    Peritoneal dialysis catheter in place 2014    Secondary hyperparathyroidism     Stroke 2001, 2013, 10/2014    weak, slurred speech, equal hand grasp/equal leg movements    Type 2 diabetes mellitus for many yrs about 25 yrs 4/22/2015    Unspecified disorder of kidney and ureter       Past Surgical History:   Procedure Laterality Date    AV FISTULA PLACEMENT Left 2011    wrist;    CATARACT EXTRACTION      EYE SURGERY      HERNIA REPAIR      NECK SURGERY      PERITONEAL CATHETER INSERTION      PORTACATH PLACEMENT  march 2016    left chest    SPINE SURGERY        Review of patient's allergies indicates:  No Known Allergies   Prescriptions Prior to Admission   Medication Sig Dispense Refill Last Dose    gabapentin (NEURONTIN) 300 MG capsule Take 1 capsule (300 mg total) by mouth every evening. (Patient taking differently: Take 300 mg by mouth once daily. ) 30 capsule 3 6/18/2017 at Unknown time    insulin aspart protamine-insulin aspart (NOVOLOG 70/30) 100 unit/mL (70-30) InPn pen Inject 5 Units into the skin 3 (three) times daily with meals. (Patient taking differently: Inject 8 Units into the skin 3 (three) times daily with meals. ) 3 mL 11 6/18/2017 at Unknown time    vitamin renal formula, B-complex-vitamin c-folic acid, (NEPHROCAP) 1 mg Cap Take 1 capsule by mouth once daily. 90 capsule 1 6/18/2017 at Unknown time    allopurinol (ZYLOPRIM) 100 MG tablet Take 1 tablet (100 mg total) by mouth once daily. 30 tablet 1 Taking    amoxicillin-clavulanate 500-125mg (AUGMENTIN) 500-125 mg Tab   0 Taking     aspirin (ECOTRIN) 81 MG EC tablet Take 1 tablet (81 mg total) by mouth once daily. 30 tablet 3 6/18/2017    carvedilol (COREG) 3.125 MG tablet Take 1 tablet (3.125 mg total) by mouth 2 (two) times daily with meals. 60 tablet 11 6/18/2017    clopidogrel (PLAVIX) 75 mg tablet Take 1 tablet (75 mg total) by mouth once daily. 90 tablet 3 6/18/2017    cyproheptadine (PERIACTIN) 4 mg tablet Take 1 tablet (4 mg total) by mouth 3 (three) times daily as needed. 90 tablet 3 Taking    docusate sodium (COLACE) 100 MG capsule Take 1 capsule (100 mg total) by mouth 2 (two) times daily as needed for Constipation. 100 capsule 0 Taking    doxycycline (VIBRA-TABS) 100 MG tablet Take 1 tablet (100 mg total) by mouth 2 (two) times daily.   Taking    EX-LAX, SENNOSIDES, ORAL Take by mouth as needed. As directed   Taking    fluconazole (DIFLUCAN) 200 MG Tab TK 1 T PO QD  5 Taking    lisinopril 10 MG tablet Take 1 tablet (10 mg total) by mouth once daily. 90 tablet 3 Taking    NYSTOP powder APPLY TO AFFECTED AREA Q 6 H TO SITE  0 Taking    ondansetron (ZOFRAN-ODT) 4 MG TbDL   1 Taking    potassium chloride SA (K-DUR,KLOR-CON) 20 MEQ tablet TK 1 T PO ONCE  D  2 Taking    pravastatin (PRAVACHOL) 80 MG tablet Take 40 mg by mouth once daily.   Taking    senna-docusate 8.6-50 mg (PERICOLACE) 8.6-50 mg per tablet Take 1 tablet by mouth 2 (two) times daily. 30 tablet 0 Taking    sulfamethoxazole-trimethoprim 400-80mg (BACTRIM,SEPTRA) 400-80 mg per tablet TK 1 T PO  D  0 Taking       Social History     Social History    Marital status:      Spouse name: N/A    Number of children: N/A    Years of education: N/A     Occupational History          disable     Social History Main Topics    Smoking status: Never Smoker    Smokeless tobacco: Never Used    Alcohol use No    Drug use: No    Sexual activity: Yes     Partners: Female     Other Topics Concern    Not on file     Social History Narrative    No narrative on file         MEDS   allopurinol  100 mg Oral Daily    aspirin  81 mg Oral Daily    carvedilol  3.125 mg Oral BID WM    clopidogrel  75 mg Oral Daily    epoetin rosenda (PROCRIT) injection  5,000 Units Intravenous Every Mon, Wed, Fri    gabapentin  300 mg Oral Daily    insulin aspart protamine-insulin aspart  8 Units Subcutaneous TID WM    lisinopril  10 mg Oral Daily    pravastatin  40 mg Oral Daily    senna-docusate 8.6-50 mg  1 tablet Oral BID    vitamin renal formula (B-complex-vitamin c-folic acid)  1 capsule Oral Daily                  CONTINOUS INFUSIONS:      Intake/Output Summary (Last 24 hours) at 06/19/17 1333  Last data filed at 06/19/17 1103   Gross per 24 hour   Intake              100 ml   Output                0 ml   Net              100 ml        HEMODYNAMICS:    Temp:  [97 °F (36.1 °C)-98.1 °F (36.7 °C)] 97 °F (36.1 °C)  Pulse:  [84-95] 86  Resp:  [16-21] 20  SpO2:  [97 %-100 %] 100 %  BP: (115-171)/(54-75) 171/75   No CP, no SOB, no cough, no fever, no chills, no nausea, no vomiting, no diarrhea.  Gen: NAD  Cards: Pulse 84  Pul: diminished breath sounds   Abdomen soft  Ext: no  edema   Skin: dry   Dialysis Access:  Left arm AV fistula  LABS   Lab Results   Component Value Date    WBC 9.19 05/04/2017    HGB 10.0 (L) 05/04/2017    HCT 31.5 (L) 05/04/2017    MCV 85 05/04/2017     05/04/2017          Recent Labs  Lab 06/19/17  0853   *   CALCIUM 9.3      K 4.2   CO2 28      BUN 25*   CREATININE 5.3*      Lab Results   Component Value Date    PTH 18.0 08/20/2015    CALCIUM 9.3 06/19/2017    CAION 1.17 03/17/2016    PHOS 3.3 04/20/2017      Lab Results   Component Value Date    IRON 31 (L) 11/04/2016    TIBC 71 (L) 11/04/2016    FERRITIN 1,200 (H) 11/04/2016        ABG  No results for input(s): PH, PO2, PCO2, HCO3, BE in the last 168 hours.      IMAGING:  CXR    ASSESSMENT / PLAN  Cutaneous abscess of abdominal wall   S/p  INCISION AND DRAINAGE   ESRD  MBD  Anemia  Hb 10  Poor  nutrition  Hypertention  /75  Weight 76.2 kg  Weight M-W-F  Renal, ADA diet  Hemodialysis

## 2017-06-19 NOTE — CONSULTS
[facility] ID Consult - Resident Note    Consultant Attending: Dr. Negro  Consultant Resident: Dr. Juarez    Reason for Consult:     Abdominal Abscess with MDR mycobacterium     Subjective:      History of Present Illness:  Terry Cote is a 67 y.o.  male who  has a past medical history of Anemia in chronic kidney disease; Arthritis; Cataract; Coronary artery disease; Diabetes mellitus; Diabetes mellitus, type 2; Diabetic nephropathy (4/22/2015); Diastolic dysfunction; Hypertension; Myocardial infarction (2001); Peritoneal dialysis catheter in place (2014); Secondary hyperparathyroidism; Stroke (2001, 2013, 10/2014); Type 2 diabetes mellitus for many yrs about 25 yrs (4/22/2015); and Unspecified disorder of kidney and ureter.. The patient presented to the [facility] on 6/19/2017 with a primary complaint of abdominal abscess.     Patient had PD catheter placed 3/2015, and has had several abdominal would infections since. 3/2016 infection with  STAPHYLOCOCCUS HAEMOLYTICUS treated with 6 weeks vanc. 4/2017 was recommended treated with vanc/ceftaz for another infection that initially was culture negative. Eventually in mid-may the cultures resulted as MDR mycobacterium abscessus. He says his wife does keep the wound clean, uses a bleach product to wash the catheter, uses hang hygiene, however it is left uncovered (no dressing). He had an appointment with surgery 6/6 and the wound was found to not be healing well. It is unclear if he was getting abx at home at this time. He presents today by recommendation of his surgeon Dr. Zuniga to get a wound washout and ID consult.     Past Medical History:  Past Medical History:   Diagnosis Date    Anemia in chronic kidney disease     Arthritis     unable to walk due to arthritis in knees.     Cataract     Coronary artery disease     Diabetes mellitus     Diabetes mellitus, type 2     Diabetic nephropathy 4/22/2015    Diastolic dysfunction      Hypertension     Myocardial infarction 2001    Peritoneal dialysis catheter in place 2014    Secondary hyperparathyroidism     Stroke 2001, 2013, 10/2014    weak, slurred speech, equal hand grasp/equal leg movements    Type 2 diabetes mellitus for many yrs about 24 yrs 4/22/2015    Unspecified disorder of kidney and ureter        Past Surgical History:  Past Surgical History:   Procedure Laterality Date    AV FISTULA PLACEMENT Left 2011    wrist;    CATARACT EXTRACTION      EYE SURGERY      HERNIA REPAIR      NECK SURGERY      PERITONEAL CATHETER INSERTION      PORTACATH PLACEMENT  march 2016    left chest    SPINE SURGERY         Allergies:  Review of patient's allergies indicates:  No Known Allergies    Medications:   In-Hospital Scheduled Medications:   allopurinol  100 mg Oral Daily    aspirin  81 mg Oral Daily    carvedilol  3.125 mg Oral BID WM    clopidogrel  75 mg Oral Daily    epoetin rosenda (PROCRIT) injection  5,000 Units Intravenous Every Mon, Wed, Fri    gabapentin  300 mg Oral Daily    insulin aspart protamine-insulin aspart  8 Units Subcutaneous TID WM    lisinopril  10 mg Oral Daily    pravastatin  40 mg Oral Daily    senna-docusate 8.6-50 mg  1 tablet Oral BID    vitamin renal formula (B-complex-vitamin c-folic acid)  1 capsule Oral Daily      In-Hospital PRN Medications:  hydrocodone-acetaminophen 10-325mg, hydrocodone-acetaminophen 5-325mg, HYDROmorphone, sodium chloride 0.9%   In-Hospital IV Infusion Medications:      Home Medications:  Prior to Admission medications    Medication Sig Start Date End Date Taking? Authorizing Provider   gabapentin (NEURONTIN) 300 MG capsule Take 1 capsule (300 mg total) by mouth every evening.  Patient taking differently: Take 300 mg by mouth once daily.  1/16/15  Yes Kathy Casey MD   insulin aspart protamine-insulin aspart (NOVOLOG 70/30) 100 unit/mL (70-30) InPn pen Inject 5 Units into the skin 3 (three) times daily with  meals.  Patient taking differently: Inject 8 Units into the skin 3 (three) times daily with meals.  11/7/16  Yes Samuel Jay MD   vitamin renal formula, B-complex-vitamin c-folic acid, (NEPHROCAP) 1 mg Cap Take 1 capsule by mouth once daily. 7/23/15  Yes Mariaa Alvarez DO   allopurinol (ZYLOPRIM) 100 MG tablet Take 1 tablet (100 mg total) by mouth once daily. 1/16/15   Kathy Casey MD   amoxicillin-clavulanate 500-125mg (AUGMENTIN) 500-125 mg Tab  5/23/17   Historical Provider, MD   aspirin (ECOTRIN) 81 MG EC tablet Take 1 tablet (81 mg total) by mouth once daily. 12/18/14   Maria Esther Mayo MD   carvedilol (COREG) 3.125 MG tablet Take 1 tablet (3.125 mg total) by mouth 2 (two) times daily with meals. 5/4/17 5/4/18  Brianna Church MD   clopidogrel (PLAVIX) 75 mg tablet Take 1 tablet (75 mg total) by mouth once daily. 2/21/17   Duncan Rincon MD   cyproheptadine (PERIACTIN) 4 mg tablet Take 1 tablet (4 mg total) by mouth 3 (three) times daily as needed. 6/12/17   Duncan Rincon MD   docusate sodium (COLACE) 100 MG capsule Take 1 capsule (100 mg total) by mouth 2 (two) times daily as needed for Constipation. 11/14/16   Kimber Dias NP   doxycycline (VIBRA-TABS) 100 MG tablet Take 1 tablet (100 mg total) by mouth 2 (two) times daily. 5/4/17   Brianna Church MD   EX-LAX, SENNOSIDES, ORAL Take by mouth as needed. As directed    Historical Provider, MD   fluconazole (DIFLUCAN) 200 MG Tab TK 1 T PO QD 5/23/17   Historical Provider, MD   lisinopril 10 MG tablet Take 1 tablet (10 mg total) by mouth once daily. 2/14/17 2/14/18  Brenda Ernandez MD   NYSTOP powder APPLY TO AFFECTED AREA Q 6 H TO SITE 6/17/16   Historical Provider, MD   ondansetron (ZOFRAN-ODT) 4 MG TbDL  11/17/16   Historical Provider, MD   potassium chloride SA (K-DUR,KLOR-CON) 20 MEQ tablet TK 1 T PO ONCE  D 7/5/16   Historical Provider, MD   pravastatin (PRAVACHOL) 80 MG tablet Take 40 mg by mouth once daily.     "Historical Provider, MD   senna-docusate 8.6-50 mg (PERICOLACE) 8.6-50 mg per tablet Take 1 tablet by mouth 2 (two) times daily. 16   Sahara Douglas MD   sulfamethoxazole-trimethoprim 400-80mg (BACTRIM,SEPTRA) 400-80 mg per tablet TK 1 T PO  D 17   Historical Provider, MD       Family History:  Family History   Problem Relation Age of Onset    Cancer Mother     Cancer Father        Social History:  Social History   Substance Use Topics    Smoking status: Never Smoker    Smokeless tobacco: Never Used    Alcohol use No       Review of Systems:  All other systems are reviewed and are negative.       Objective:   Last 24 Hour Vital Signs:  BP  Min: 115/54  Max: 171/75  Temp  Av.6 °F (36.4 °C)  Min: 97 °F (36.1 °C)  Max: 98.1 °F (36.7 °C)  Pulse  Av.7  Min: 84  Max: 95  Resp  Av.8  Min: 16  Max: 21  SpO2  Av.8 %  Min: 97 %  Max: 100 %  Height  Av' 11" (180.3 cm)  Min: 5' 11" (180.3 cm)  Max: 5' 11" (180.3 cm)  Weight  Av.2 kg (168 lb)  Min: 76.2 kg (168 lb)  Max: 76.2 kg (168 lb)  No intake/output data recorded.    Physical Examination:  Constitutional: Well-developed and well-nourished. No distress.   HENT:   Head: Normocephalic and atraumatic.   Eyes: Conjunctivae and EOM are normal. No scleral icterus.   Neck: Normal range of motion.   Cardiovascular: RRR, no MRG    Ext: Left wrist incision clean dry and intact, fistula plus thrill   Pulmonary/Chest: Effort normal. No stridor. No respiratory distress.   Abdominal: Soft. No distension. Bandage in place.   Musculoskeletal: Normal range of motion. He exhibits no edema or tenderness.   Neurological: He is alert and oriented to person, place, and time.   Skin: No rash noted. He is not diaphoretic. No pallor.   Psychiatric: He has a normal mood and affect. His behavior is normal.     Laboratory Results:  Most Recent Data:  CBC: Lab Results   Component Value Date    WBC 9.19 2017    HGB 10.0 (L) 2017    HCT 31.5 (L) " 05/04/2017     05/04/2017    MCV 85 05/04/2017    RDW 14.5 05/04/2017     BMP: Lab Results   Component Value Date     06/19/2017    K 4.2 06/19/2017     06/19/2017    CO2 28 06/19/2017    BUN 25 (H) 06/19/2017     (H) 06/19/2017    CALCIUM 9.3 06/19/2017    MG 1.7 04/20/2017    PHOS 3.3 04/20/2017     LFTs: Lab Results   Component Value Date    PROT 7.5 04/22/2017    ALBUMIN 2.3 (L) 04/22/2017    BILITOT 0.6 04/22/2017    AST 17 04/22/2017    ALKPHOS 84 04/22/2017    ALT 15 04/22/2017     Coags:   Lab Results   Component Value Date    INR 1.0 04/19/2017     FLP: Lab Results   Component Value Date    CHOL 82 (L) 06/06/2016    HDL 37 (L) 06/06/2016    LDLCALC 34.6 (L) 06/06/2016    TRIG 52 06/06/2016    CHOLHDL 45.1 06/06/2016     DM: Lab Results   Component Value Date    HGBA1C 5.5 02/08/2017    HGBA1C 6.0 11/04/2016    HGBA1C 6.4 (H) 06/07/2016    LDLCALC 34.6 (L) 06/06/2016    CREATININE 5.3 (H) 06/19/2017     Thyroid: Lab Results   Component Value Date    TSH 0.484 04/19/2017    FREET4 1.03 12/18/2014     Anemia: Lab Results   Component Value Date    IRON 31 (L) 11/04/2016    TIBC 71 (L) 11/04/2016    FERRITIN 1,200 (H) 11/04/2016    SVLCOZCR05 1718 (H) 11/04/2016    FOLATE 18.2 11/04/2016     Cardiac: Lab Results   Component Value Date    TROPONINI 0.015 02/12/2017    BNP 24 04/19/2017     Urinalysis: Lab Results   Component Value Date    LABURIN No significant growth 04/19/2017    COLORU Yellow 04/19/2017    SPECGRAV 1.025 04/19/2017    NITRITE Negative 04/19/2017    KETONESU Negative 04/19/2017    UROBILINOGEN Negative 04/19/2017     Microbiology Data:  Wound cx 6/19 pending  Wound cx 4/21 MYCOBACTERIUM ABSCESSUS       Other Results:  Radiology:  Us Soft Tissue Misc    Result Date: 5/30/2017  Procedure: Ultrasound left periumbilical region Technique: Transverse and longitudinal imaging of the left lateral periumbilical region with grayscale and color-flow imaging. Comparison: None  Finding: There is heterogeneous echogenicity underlying the area of clinical concern in the left lateral periumbilical region at site of recent peritoneal dialysis catheter removal in light of history.  No focal drainable collection to suggest abscess or definite hematoma.  Clinical correlation and further evaluation is warranted.     Ill-defined heterogeneous echogenicity within the area of clinical concern at site of recent peritoneal dialysis catheter removal.  No evidence for focal collection to suggest significant hematoma or drainable abscess.  Clinical correlation and further evaluation as warranted clinically. Electronically signed by: BEBE RODRÍGUEZ DO Date:     05/30/17 Time:    14:55     Ct Abdomen Pelvis  Without Contrast    Result Date: 6/16/2017  Clinical indication: 67-year-old male with abdominal pain. Comparison: CT abdomen and pelvis 2/12/17. Technique: Transaxial images were obtained through the abdomen and pelvis in 5 mm increments without the use of p.o. or IV contrast. Findings: The visualized portion of the heart is unremarkable.  There is moderate size right-sided pleural effusion resulting in near complete collapse of the right lower lobe.  Nonspecific hyperdense material again visualized within both lung bases.  No significant effusion on the left. No significant focal hepatic abnormalities visualized on this noncontrast exam.  There is no intra-or extrahepatic biliary ductal dilatation.  Mild hyperdense layering material, possible stones or sludge seen within the gallbladder.  The stomach, pancreas, spleen, and adrenal glands are unremarkable. Bilateral nonobstructing renal stones are visualized similar to previous exam.  Two cysts are visualized within the right kidney.  No evidence of hydronephrosis.  Ureters are unremarkable along their courses.  Urinary bladder is unremarkable.  Prostate is enlarged and abuts and to the urinary bladder base.  Aorta tapers normally with moderate  atherosclerosis.  The visualized loops of small and large bowel show no evidence of obstruction or inflammation.  No significant free fluid.  Previously visualized ascites has resolved.  Previously visualized left-sided surgical drain has been removed.  There is small subcutaneous defect with underlying nonspecific surrounding stranding and relative hyperdensity visualized in the subcutaneous tissues and left rectus abdominis near the site of prior surgical drain entry.  No evidence of focal fluid collections or abscess.  Findings could reflect postsurgical change with possible hematoma. Multilevel degenerative changes within the spine with prominent anterior bridging osteophytosis.  Small umbilical hernia also noted.  Small fat-containing inguinal hernia seen on the left.    1.  Small skin and subcutaneous defect in the left periumbilical region with mild stranding and relative hyperdensity within the subcutaneous tissues and left rectus abdominis which may reflect postsurgical change or possible hematoma status post surgical drain removal in this region.  No focal fluid collections or abscess seen 2.  Otherwise no acute intra-abdominal process identified. 3.  Large right-sided pleural effusion resulting in near complete collapse of the right lower lobe. 4.  Previously visualized ascites has resolved. 5.  Bilateral nonobstructing renal stones with two right renal cysts. 6.  Layering stones versus sludge seen within the gallbladder. 7.  Additional findings as detailed above. Electronically signed by: AZUL CASAREZ MD Date:     06/16/17 Time:    09:36          Assessment:     Terry Cote is a 67 y.o. male with:     Plan:     Abdominal Abscess  -PD catheter placed 3/2015, several abdominal would infections since with  STAPHYLOCOCCUS HAEMOLYTICUS and MDR mycobacterium abscessus   -Washout 6/19 with cultures pending  -Will need triple therapy for a minimum 6-8 weeks. Abx choice is amikacin+ 2 (tigecycline,  cefoxitin, imipenem). Need to ask hospital if we can get tigecycline as it is not in stock. Will hold off on starting abx until we know which ones we have access to here.   -debridement is an important part of MDR mycobacterium abscessus treatment, may need further washouts in this process.     Thank you for allowing us to participate in the care of this patient. All recommendations d/w staff. Will continue to follow. Please call if you have any questions regarding this consult.    Rosana Juarez  [facility] Internal Medicine [facility]

## 2017-06-19 NOTE — BRIEF OP NOTE
[facility]  Surgery Department  Operative Note    SUMMARY     Date of Procedure: 6/19/2017     Procedure: Procedure(s) (LRB):  INCISION AND DRAINAGE (I & D)-ABDOMEN (N/A)     Surgeon(s) and Role:     * Louisa Zuniga MD - Primary    Assisting Surgeon: None    Pre-Operative Diagnosis: Cutaneous abscess of abdominal wall [L02.211]    Post-Operative Diagnosis: Post-Op Diagnosis Codes:     * Cutaneous abscess of abdominal wall [L02.211]    Anesthesia: General/MAC    Technical Procedures Used:  open    Description of the Findings of the Procedure:  Abscess above fascia    Significant Surgical Tasks Conducted by the Assistant(s), if Applicable:     Complications: No    Estimated Blood Loss (EBL):  < 25ml             Implants: * No implants in log *    Specimens:   Cultures             Condition: Good    Disposition: PACU - hemodynamically stable.    Attestation: I was present and scrubbed for the entire procedure.

## 2017-06-19 NOTE — PROGRESS NOTES
"Patient is admitted to room from PACU, accompanied by PACU staff.  Wife is at bedside.  Patient is not complaining of any pain at this time.  States, "hungry."  Dietary called to send a tray to patient.  Abdominal wound is intact with gauze and Tegaderm.  Safety maintained with bed low, wheels locked, and side rails up.  Call light within reach.  Will continue to monitor.  "

## 2017-06-19 NOTE — PLAN OF CARE
POC board updated and reviewed with pt and pt's family. Pt and pt's family verbalize understanding. Safety precautions maintained. Call bell in reach Bed locked and in lowest position. Instructed pt to call for assistance. Pt verbalizes understanding.

## 2017-06-19 NOTE — ANESTHESIA POSTPROCEDURE EVALUATION
"Anesthesia Post Evaluation    Patient: Terry Cote    Procedure(s) Performed: Procedure(s) (LRB):  INCISION AND DRAINAGE (I & D)-ABDOMEN (N/A)    Final Anesthesia Type: general  Patient location during evaluation: PACU  Patient participation: Yes- Able to Participate  Level of consciousness: awake and alert  Post-procedure vital signs: reviewed and stable  Pain management: adequate  Airway patency: patent  PONV status at discharge: No PONV  Anesthetic complications: no      Cardiovascular status: hemodynamically stable  Respiratory status: unassisted  Hydration status: euvolemic  Follow-up not needed.        Visit Vitals  /74 (BP Location: Right arm, Patient Position: Lying, BP Method: Automatic)   Pulse 88   Temp 36.6 °C (97.8 °F) (Oral)   Resp 18   Ht 5' 11" (1.803 m)   Wt 76.2 kg (168 lb)   SpO2 100%   BMI 23.43 kg/m²       Pain/Deandre Score: Pain Assessment Performed: Yes (6/19/2017  2:10 PM)  Presence of Pain: denies (6/19/2017  2:10 PM)  Deandre Score: 10 (6/19/2017 12:30 PM)      "

## 2017-06-19 NOTE — TRANSFER OF CARE
"Anesthesia Transfer of Care Note    Patient: Terry Cote    Procedure(s) Performed: Procedure(s) (LRB):  INCISION AND DRAINAGE (I & D)-ABDOMEN (N/A)    Patient location: PACU    Anesthesia Type: MAC    Transport from OR: Transported from OR on room air with adequate spontaneous ventilation    Post pain: adequate analgesia    Post assessment: tolerated procedure well and no apparent anesthetic complications    Post vital signs: stable    Level of consciousness: sedated    Nausea/Vomiting: no nausea/vomiting    Complications: none    Transfer of care protocol was followed      Last vitals:   Visit Vitals  BP (!) 146/58 (BP Location: Right arm, Patient Position: Lying, BP Method: Automatic)   Pulse 95   Temp 36.7 °C (98.1 °F) (Oral)   Resp 16   Ht 5' 11" (1.803 m)   Wt 76.2 kg (168 lb)   SpO2 97%   BMI 23.43 kg/m²     "

## 2017-06-19 NOTE — OR NURSING
1040 Family called and updated  KRISH Smith RN  1100 Family called and update given. KRISH Smith RN

## 2017-06-19 NOTE — INTERVAL H&P NOTE
The patient has been examined and the H&P has been reviewed:    I concur with the findings and changes have been noted since the H&P was written: resistant organism, will debride today and plan catheter placement once wound cleaned.     Anesthesia/Surgery risks, benefits and alternative options discussed and understood by patient/family.          Active Hospital Problems    Diagnosis  POA    Mycobacterium abscessus infection [A31.9]  Yes      Resolved Hospital Problems    Diagnosis Date Resolved POA   No resolved problems to display.

## 2017-06-19 NOTE — PLAN OF CARE
Problem: Hemodialysis (Adult)  Goal: Signs and Symptoms of Listed Potential Problems Will be Absent, Minimized or Managed (Hemodialysis)  Signs and symptoms of listed potential problems will be absent, minimized or managed by discharge/transition of care (reference Hemodialysis (Adult) CPG).  Outcome: Ongoing (interventions implemented as appropriate)   06/19/17 1429   Hemodialysis   Problems Assessed (Hemodialysis) all   Problems Present (Hemodialysis) electrolyte imbalance;fluid imbalance   POC discussed with patient  HD with UF today

## 2017-06-20 ENCOUNTER — TELEPHONE (OUTPATIENT)
Dept: SURGERY | Facility: CLINIC | Age: 67
End: 2017-06-20

## 2017-06-20 ENCOUNTER — ANESTHESIA EVENT (OUTPATIENT)
Dept: SURGERY | Facility: HOSPITAL | Age: 67
DRG: 901 | End: 2017-06-20
Payer: MEDICARE

## 2017-06-20 LAB
HBV CORE IGM SERPL QL IA: NEGATIVE
HBV SURFACE AB SER-ACNC: POSITIVE M[IU]/ML
HBV SURFACE AG SERPL QL IA: NEGATIVE
POCT GLUCOSE: 136 MG/DL (ref 70–110)
POCT GLUCOSE: 141 MG/DL (ref 70–110)
POCT GLUCOSE: 153 MG/DL (ref 70–110)
POCT GLUCOSE: 170 MG/DL (ref 70–110)
POCT GLUCOSE: 194 MG/DL (ref 70–110)

## 2017-06-20 PROCEDURE — 94761 N-INVAS EAR/PLS OXIMETRY MLT: CPT

## 2017-06-20 PROCEDURE — 97605 NEG PRS WND THER DME<=50SQCM: CPT

## 2017-06-20 PROCEDURE — 63600175 PHARM REV CODE 636 W HCPCS: Performed by: SURGERY

## 2017-06-20 PROCEDURE — 25000003 PHARM REV CODE 250: Performed by: SURGERY

## 2017-06-20 PROCEDURE — 11000001 HC ACUTE MED/SURG PRIVATE ROOM

## 2017-06-20 RX ORDER — SODIUM CHLORIDE 9 MG/ML
INJECTION, SOLUTION INTRAVENOUS ONCE
Status: DISCONTINUED | OUTPATIENT
Start: 2017-06-20 | End: 2017-06-21 | Stop reason: HOSPADM

## 2017-06-20 RX ORDER — SODIUM CHLORIDE 9 MG/ML
INJECTION, SOLUTION INTRAVENOUS
Status: CANCELLED | OUTPATIENT
Start: 2017-06-20

## 2017-06-20 RX ORDER — SODIUM CHLORIDE 9 MG/ML
INJECTION, SOLUTION INTRAVENOUS ONCE
Status: CANCELLED | OUTPATIENT
Start: 2017-06-20 | End: 2017-06-20

## 2017-06-20 RX ORDER — SODIUM CHLORIDE 9 MG/ML
INJECTION, SOLUTION INTRAVENOUS
Status: DISCONTINUED | OUTPATIENT
Start: 2017-06-20 | End: 2017-06-21 | Stop reason: HOSPADM

## 2017-06-20 RX ADMIN — GABAPENTIN 300 MG: 300 CAPSULE ORAL at 08:06

## 2017-06-20 RX ADMIN — INSULIN ASPART 8 UNITS: 100 INJECTION, SUSPENSION SUBCUTANEOUS at 07:06

## 2017-06-20 RX ADMIN — INSULIN ASPART 8 UNITS: 100 INJECTION, SUSPENSION SUBCUTANEOUS at 11:06

## 2017-06-20 RX ADMIN — CLOPIDOGREL BISULFATE 75 MG: 75 TABLET ORAL at 08:06

## 2017-06-20 RX ADMIN — PRAVASTATIN SODIUM 40 MG: 40 TABLET ORAL at 08:06

## 2017-06-20 RX ADMIN — CARVEDILOL 3.12 MG: 3.12 TABLET, FILM COATED ORAL at 07:06

## 2017-06-20 RX ADMIN — LISINOPRIL 10 MG: 10 TABLET ORAL at 08:06

## 2017-06-20 RX ADMIN — ASPIRIN 81 MG: 81 TABLET, COATED ORAL at 08:06

## 2017-06-20 RX ADMIN — Medication 1 CAPSULE: at 08:06

## 2017-06-20 RX ADMIN — HYDROMORPHONE HYDROCHLORIDE 1 MG: 1 INJECTION, SOLUTION INTRAMUSCULAR; INTRAVENOUS; SUBCUTANEOUS at 02:06

## 2017-06-20 RX ADMIN — INSULIN ASPART 8 UNITS: 100 INJECTION, SUSPENSION SUBCUTANEOUS at 04:06

## 2017-06-20 RX ADMIN — STANDARDIZED SENNA CONCENTRATE AND DOCUSATE SODIUM 1 TABLET: 8.6; 5 TABLET, FILM COATED ORAL at 08:06

## 2017-06-20 RX ADMIN — ALLOPURINOL 100 MG: 100 TABLET ORAL at 08:06

## 2017-06-20 NOTE — PLAN OF CARE
Problem: Patient Care Overview  Goal: Plan of Care Review  Outcome: Revised  Patient is awake, alert, and oriented.  Wound Care Nurse place Wound Vac to abdominal wound.  Denies pain except during the placement of wound vac.  Wife is at bedside.  Accuchecks within normal limits.  Safety maintained.  Will continue to monitor.

## 2017-06-20 NOTE — PROGRESS NOTES
[facility]  General Surgery  Progress Note    Subjective:     History of Present Illness:  No notes on file    Post-Op Info:  Procedure(s) (LRB):  INCISION AND DRAINAGE (I & D)-ABDOMEN (N/A)   1 Day Post-Op     Interval History:  anju HD and diet, no nv  Pain controlled with medications    Medications:  Continuous Infusions:   Scheduled Meds:   sodium chloride 0.9%   Intravenous Once    allopurinol  100 mg Oral Daily    aspirin  81 mg Oral Daily    carvedilol  3.125 mg Oral BID WM    clopidogrel  75 mg Oral Daily    epoetin rosenda (PROCRIT) injection  5,000 Units Intravenous Every Mon, Wed, Fri    gabapentin  300 mg Oral Daily    insulin aspart protamine-insulin aspart  8 Units Subcutaneous TID WM    lisinopril  10 mg Oral Daily    pravastatin  40 mg Oral Daily    senna-docusate 8.6-50 mg  1 tablet Oral BID    vitamin renal formula (B-complex-vitamin c-folic acid)  1 capsule Oral Daily     PRN Meds:sodium chloride 0.9%, hydrocodone-acetaminophen 10-325mg, hydrocodone-acetaminophen 5-325mg, HYDROmorphone, sodium chloride 0.9%     Review of patient's allergies indicates:  No Known Allergies  Objective:     Vital Signs (Most Recent):  Temp: 98.7 °F (37.1 °C) (06/20/17 0724)  Pulse: 97 (06/20/17 0724)  Resp: 20 (06/20/17 0724)  BP: 126/68 (06/20/17 0724)  SpO2: 98 % (06/20/17 0947) Vital Signs (24h Range):  Temp:  [97 °F (36.1 °C)-99.5 °F (37.5 °C)] 98.7 °F (37.1 °C)  Pulse:  [] 97  Resp:  [16-21] 20  SpO2:  [97 %-100 %] 98 %  BP: (108-171)/(54-81) 126/68     Weight: 76.2 kg (168 lb)  Body mass index is 23.43 kg/m².    Intake/Output - Last 3 Shifts       06/18 0700 - 06/19 0659 06/19 0700 - 06/20 0659 06/20 0700 - 06/21 0659    P.O.  250     I.V. (mL/kg)  100 (1.3)     Other  400     Total Intake(mL/kg)  750 (9.8)     Urine (mL/kg/hr)  200     Other  1900     Stool  0     Total Output   2100      Net   -1350             Stool Occurrence  0 x           Physical Exam   Abdominal: Soft. He  exhibits no distension. There is no tenderness.   Dressing serosang staining         Significant Labs:    BMP:   Recent Labs  Lab 06/19/17  0853   *      K 4.2      CO2 28   BUN 25*   CREATININE 5.3*   CALCIUM 9.3       Significant Diagnostics:  n/a    Assessment/Plan:     Mycobacterium abscessus infection    ID following for initiation of abx  Will plan wound vac placement today  Dye catheter tomorrow            Louisa Zuniga MD  General Surgery  [facility]

## 2017-06-20 NOTE — SUBJECTIVE & OBJECTIVE
Interval History:  anju HD and diet, no nv  Pain controlled with medications    Medications:  Continuous Infusions:   Scheduled Meds:   sodium chloride 0.9%   Intravenous Once    allopurinol  100 mg Oral Daily    aspirin  81 mg Oral Daily    carvedilol  3.125 mg Oral BID WM    clopidogrel  75 mg Oral Daily    epoetin rosenda (PROCRIT) injection  5,000 Units Intravenous Every Mon, Wed, Fri    gabapentin  300 mg Oral Daily    insulin aspart protamine-insulin aspart  8 Units Subcutaneous TID WM    lisinopril  10 mg Oral Daily    pravastatin  40 mg Oral Daily    senna-docusate 8.6-50 mg  1 tablet Oral BID    vitamin renal formula (B-complex-vitamin c-folic acid)  1 capsule Oral Daily     PRN Meds:sodium chloride 0.9%, hydrocodone-acetaminophen 10-325mg, hydrocodone-acetaminophen 5-325mg, HYDROmorphone, sodium chloride 0.9%     Review of patient's allergies indicates:  No Known Allergies  Objective:     Vital Signs (Most Recent):  Temp: 98.7 °F (37.1 °C) (06/20/17 0724)  Pulse: 97 (06/20/17 0724)  Resp: 20 (06/20/17 0724)  BP: 126/68 (06/20/17 0724)  SpO2: 98 % (06/20/17 0947) Vital Signs (24h Range):  Temp:  [97 °F (36.1 °C)-99.5 °F (37.5 °C)] 98.7 °F (37.1 °C)  Pulse:  [] 97  Resp:  [16-21] 20  SpO2:  [97 %-100 %] 98 %  BP: (108-171)/(54-81) 126/68     Weight: 76.2 kg (168 lb)  Body mass index is 23.43 kg/m².    Intake/Output - Last 3 Shifts       06/18 0700 - 06/19 0659 06/19 0700 - 06/20 0659 06/20 0700 - 06/21 0659    P.O.  250     I.V. (mL/kg)  100 (1.3)     Other  400     Total Intake(mL/kg)  750 (9.8)     Urine (mL/kg/hr)  200     Other  1900     Stool  0     Total Output   2100      Net   -1350             Stool Occurrence  0 x           Physical Exam   Abdominal: Soft. He exhibits no distension. There is no tenderness.   Dressing serosang staining         Significant Labs:    BMP:   Recent Labs  Lab 06/19/17  0853   *      K 4.2      CO2 28   BUN 25*   CREATININE 5.3*   CALCIUM  9.3       Significant Diagnostics:  n/a

## 2017-06-20 NOTE — SUBJECTIVE & OBJECTIVE
Interval History: has wound vac to abd wound; no other complaints; still waiting on tigecycline availability    Review of Systems   All other systems reviewed and are negative.    Objective:     Vital Signs (Most Recent):  Temp: 98.3 °F (36.8 °C) (06/20/17 1600)  Pulse: 89 (06/20/17 1600)  Resp: 17 (06/20/17 1600)  BP: (!) 118/58 (06/20/17 1600)  SpO2: 96 % (06/20/17 1626) Vital Signs (24h Range):  Temp:  [97.7 °F (36.5 °C)-99.5 °F (37.5 °C)] 98.3 °F (36.8 °C)  Pulse:  [] 89  Resp:  [17-20] 17  SpO2:  [96 %-98 %] 96 %  BP: (118-136)/(56-71) 118/58     Weight: 76.2 kg (168 lb)  Body mass index is 23.43 kg/m².    Estimated Creatinine Clearance: 14.2 mL/min (based on Cr of 5.3).    Physical Exam   Constitutional: He is oriented to person, place, and time. He appears well-developed and well-nourished. No distress.   HENT:   Head: Normocephalic and atraumatic.   Mouth/Throat: Oropharynx is clear and moist.   No teeth   Eyes: Conjunctivae and EOM are normal. Pupils are equal, round, and reactive to light.   Neck: Normal range of motion. Neck supple. No JVD present.   Cardiovascular: Normal rate, regular rhythm and normal heart sounds.    Faint pedal pulses   Pulmonary/Chest: Effort normal and breath sounds normal.   Abdominal: Soft. Bowel sounds are normal. He exhibits no distension. There is no tenderness.   Midline wound to wound vac; nontender; no redness   Genitourinary:   Genitourinary Comments: No sargent   Musculoskeletal: Normal range of motion. He exhibits no edema or tenderness.   LUE AVF; RUE PIV   Neurological: He is alert and oriented to person, place, and time. Coordination normal.   Dysarthria; left mild paresis   Skin:   As above   Psychiatric:   Appropriate and cooperative   Nursing note and vitals reviewed.      Significant Labs:   CBC: No results for input(s): WBC, HGB, HCT, PLT in the last 48 hours.  CMP:   Recent Labs  Lab 06/19/17  0853      K 4.2      CO2 28   *   BUN 25*    CREATININE 5.3*   CALCIUM 9.3   ANIONGAP 9   EGFRNONAA 10*     Wound Culture:   Recent Labs  Lab 04/19/17  1435 04/19/17  1751 04/21/17  0845 06/06/17  0950 06/19/17  1039   LABAERO No growth Results called to and read back by:Dr. Flynn 04/24/2017  15:15  BEADED GRAM POSITIVE RODModerateOrganism is Kinyoun positiveOrganism further identified by [facility] as Mycobacterium abscessus group Results called to and read back by:Dr. Flynn 04/24/2017  15:16  BEADED GRAM POSITIVE RODModerateOrganism is Kinyoun positiveOrganism further identified by [facility] as Mycobacterium abscessus groupFor susceptibility see order # [id number] Skin isela,  no predominant organism No growth     All pertinent labs within the past 24 hours have been reviewed.    Significant Imaging: I have reviewed all pertinent imaging results/findings within the past 24 hours.

## 2017-06-20 NOTE — ASSESSMENT & PLAN NOTE
Abd wall infection from PD cath insertion site. Clinically ok with wound vac. Needs at least 3 drug Rx including amikacin, tigecycline. Clarithro R and imipenem cefoxitin I. To get port tomorrow    Rec;  ---port placement  ---start 3 drugs at the same time; awaiting tigecycline availability  ---length of rx 1+ months based on response

## 2017-06-20 NOTE — CONSULTS
Consulted to place wound vac to LQ abdomen per MD VENKAT Zuniga. Pain medication give per NANCY Bolton for wound care. Next dressing change due Friday 6/23/17.    I&D POD #1: 6 x 1 x 1.5 (cm) with undermining from 2-10 o'clock of 2 (cm). Irrigated with saline and placed black sponge within wound bed. Transparent dressing intact. Suction at 125 mmHg low continuous.

## 2017-06-20 NOTE — PROGRESS NOTES
[facility]  Infectious Disease  Progress Note    Patient Name: Terry Cote  MRN: [id number]  Admission Date: 6/19/2017  Length of Stay: 1 days  Attending Physician: Louisa Zuniga MD  Primary Care Provider: Duncan Rincon MD    Isolation Status: Contact  Assessment/Plan:      Mycobacterium abscessus infection    Abd wall infection from PD cath insertion site. Clinically ok with wound vac. Needs at least 3 drug Rx including amikacin, tigecycline. Clarithro R and imipenem cefoxitin I. To get port tomorrow    Rec;  ---port placement  ---start 3 drugs at the same time; awaiting tigecycline availability  ---length of rx 1+ months based on response          Thank you for your consult. I will follow-up with patient. Please contact us if you have any additional questions.    Devin Negro MD  Infectious Disease  [facility]    Subjective:     Principal Problem:<principal problem not specified>    HPI: No notes on file  Interval History: has wound vac to abd wound; no other complaints; still waiting on tigecycline availability    Review of Systems   All other systems reviewed and are negative.    Objective:     Vital Signs (Most Recent):  Temp: 98.3 °F (36.8 °C) (06/20/17 1600)  Pulse: 89 (06/20/17 1600)  Resp: 17 (06/20/17 1600)  BP: (!) 118/58 (06/20/17 1600)  SpO2: 96 % (06/20/17 1626) Vital Signs (24h Range):  Temp:  [97.7 °F (36.5 °C)-99.5 °F (37.5 °C)] 98.3 °F (36.8 °C)  Pulse:  [] 89  Resp:  [17-20] 17  SpO2:  [96 %-98 %] 96 %  BP: (118-136)/(56-71) 118/58     Weight: 76.2 kg (168 lb)  Body mass index is 23.43 kg/m².    Estimated Creatinine Clearance: 14.2 mL/min (based on Cr of 5.3).    Physical Exam   Constitutional: He is oriented to person, place, and time. He appears well-developed and well-nourished. No distress.   HENT:   Head: Normocephalic and atraumatic.   Mouth/Throat: Oropharynx is clear and moist.   No teeth   Eyes: Conjunctivae and EOM are normal.  Pupils are equal, round, and reactive to light.   Neck: Normal range of motion. Neck supple. No JVD present.   Cardiovascular: Normal rate, regular rhythm and normal heart sounds.    Faint pedal pulses   Pulmonary/Chest: Effort normal and breath sounds normal.   Abdominal: Soft. Bowel sounds are normal. He exhibits no distension. There is no tenderness.   Midline wound to wound vac; nontender; no redness   Genitourinary:   Genitourinary Comments: No sargent   Musculoskeletal: Normal range of motion. He exhibits no edema or tenderness.   LUE AVF; RUE PIV   Neurological: He is alert and oriented to person, place, and time. Coordination normal.   Dysarthria; left mild paresis   Skin:   As above   Psychiatric:   Appropriate and cooperative   Nursing note and vitals reviewed.      Significant Labs:   CBC: No results for input(s): WBC, HGB, HCT, PLT in the last 48 hours.  CMP:   Recent Labs  Lab 06/19/17  0853      K 4.2      CO2 28   *   BUN 25*   CREATININE 5.3*   CALCIUM 9.3   ANIONGAP 9   EGFRNONAA 10*     Wound Culture:   Recent Labs  Lab 04/19/17  1435 04/19/17  1751 04/21/17  0845 06/06/17  0950 06/19/17  1039   LABAERO No growth Results called to and read back by:Dr. Flynn 04/24/2017  15:15  BEADED GRAM POSITIVE RODModerateOrganism is Kinyoun positiveOrganism further identified by [facility] as Mycobacterium abscessus group Results called to and read back by:Dr. Flynn 04/24/2017  15:16  BEADED GRAM POSITIVE RODModerateOrganism is Kinyoun positiveOrganism further identified by [facility] as Mycobacterium abscessus groupFor susceptibility see order # [id number] Skin isela,  no predominant organism No growth     All pertinent labs within the past 24 hours have been reviewed.    Significant Imaging: I have reviewed all pertinent imaging results/findings within the past 24 hours.

## 2017-06-20 NOTE — TELEPHONE ENCOUNTER
"----- Message from Paula Rakesh sent at 6/20/2017  2:05 PM CDT -----  Contact: Self/[phone]  Patient said he needs to speak with you and the situation is complicated. Please advise    06/20/17    1432  "The doctor that's going to order the antiobiotics for me, [facility] Patient informed that request would be sent to Dr. Zuniga. Pt verbalized understanding.  "

## 2017-06-20 NOTE — PROGRESS NOTES
Progress Note  Nephrology      Consult Requested By: Louisa Zuniga MD      SUBJECTIVE:     Overnight events  Patient is a 67 y.o. male     Patient Active Problem List   Diagnosis    Unspecified transient cerebral ischemia    CHF with left ventricular diastolic dysfunction, NYHA class 1    Diabetes mellitus, type 2    Dyslipidemia associated with type 2 diabetes mellitus    Hyperuricemia    ESRD on dialysis    Organ transplant candidate    Pre-transplant evaluation for chronic kidney disease    Type 2 diabetes mellitus for many yrs about 25 yrs    Diabetic nephropathy    Secondary hyperparathyroidism    Anemia in chronic kidney disease    Neuropathy    Stroke    Elevated PSA    Type 2 diabetes mellitus with chronic kidney disease on chronic dialysis, without long-term current use of insulin    Vitamin D deficiency    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     Cataract     Coronary artery disease     Diabetes mellitus     Diabetes mellitus, type 2     Diabetic nephropathy 4/22/2015    Diastolic dysfunction     Hypertension     Myocardial infarction 2001    Peritoneal dialysis catheter in place 2014    Secondary hyperparathyroidism     Stroke 2001, 2013, 10/2014    weak, slurred speech, equal hand grasp/equal leg movements    Type 2 diabetes mellitus for many yrs about 25 yrs 4/22/2015    Unspecified disorder of  kidney and ureter               OBJECTIVE:     Vitals:    06/20/17 1200 06/20/17 1224 06/20/17 1536 06/20/17 1626   BP: (!) 122/57  (!) 118/56    BP Location:   Right arm    Patient Position:   Lying    BP Method:   Automatic    Pulse: 88  88    Resp: 17 18    Temp: 98.6 °F (37 °C)  98.1 °F (36.7 °C)    TempSrc: Oral  Oral    SpO2:  98%  96%   Weight:       Height:           Temp: 98.1 °F (36.7 °C) (06/20/17 1536)  Pulse: 88 (06/20/17 1536)  Resp: 18 (06/20/17 1536)  BP: (!) 118/56 (06/20/17 1536)  SpO2: 96 % (06/20/17 1626)               Medications:   sodium chloride 0.9%   Intravenous Once    allopurinol  100 mg Oral Daily    aspirin  81 mg Oral Daily    carvedilol  3.125 mg Oral BID WM    clopidogrel  75 mg Oral Daily    epoetin rosenda (PROCRIT) injection  5,000 Units Intravenous Every Mon, Wed, Fri    gabapentin  300 mg Oral Daily    insulin aspart protamine-insulin aspart  8 Units Subcutaneous TID WM    lisinopril  10 mg Oral Daily    pravastatin  40 mg Oral Daily    senna-docusate 8.6-50 mg  1 tablet Oral BID    vitamin renal formula (B-complex-vitamin c-folic acid)  1 capsule Oral Daily                  Physical Exam:  General appearance:NAD  Poor appetite  No SOB  Lungs: diminished breath sounds  Heart: Pulse 88  Abdomen: soft  Extremities: edema  Skin: dry  Laboratory:  ABG  Labs reviewed  Recent Results (from the past 336 hour(s))   Basic metabolic panel    Collection Time: 06/19/17  8:53 AM   Result Value Ref Range    Sodium 139 136 - 145 mmol/L    Potassium 4.2 3.5 - 5.1 mmol/L    Chloride 102 95 - 110 mmol/L    CO2 28 23 - 29 mmol/L    BUN, Bld 25 (H) 8 - 23 mg/dL    Creatinine 5.3 (H) 0.5 - 1.4 mg/dL    Calcium 9.3 8.7 - 10.5 mg/dL    Anion Gap 9 8 - 16 mmol/L     No results found for this or any previous visit (from the past 336 hour(s)).  Urinalysis  No results for input(s): COLORU, CLARITYU, SPECGRAV, PHUR, PROTEINUA, GLUCOSEU, BILIRUBINCON, BLOODU, WBCU, RBCU, BACTERIA, MUCUS, NITRITE,  LEUKOCYTESUR, UROBILINOGEN, HYALINECASTS in the last 24 hours.    Diagnostic Results:  X-Ray: Reviewed  US: Reviewed  Echo: Reviewed  ACCESS    ASSESSMENT/PLAN:   Abdominal abscess  S/p I and D  ESRD  MBD  Anemia  Hb 10  Poor nutrition  Regular diet  Supplements  /56

## 2017-06-20 NOTE — PLAN OF CARE
POD#1 S/P I & D Abdomen  Per ID notes:    Abdominal Abscess  -PD catheter placed 3/2015, several abdominal would infections since with  STAPHYLOCOCCUS HAEMOLYTICUS and MDR mycobacterium abscessus   -Washout 6/19 with cultures pending  -Will need triple therapy for a minimum 6-8 weeks. Abx choice is amikacin+ 2 (tigecycline, cefoxitin, imipenem). Need to ask hospital if we can get tigecycline as it is not in stock. Will hold off on starting abx until we know which ones we have access to here.   -debridement is an important part of MDR mycobacterium abscessus treatment, may need further washouts in this process.    Per surgery notes:   Mycobacterium abscessus infection     ID following for initiation of abx  Will plan wound vac placement today  Dye catheter tomorrow         Pt lives with his spouse who assists with ADLS and wound care. Pt has all DME in place and has used [facility] in past but not current. Pt and wife staed [facility] would be their preferrence for HH upon discharge. Pt has been going to [facility] . Pt's family or neighbors provide transportation .     FRED Le informed of above and will coordinate discharge needs.       06/20/17 1204   Discharge Assessment   Assessment Type Discharge Planning Assessment   Confirmed/corrected address and phone number on facesheet? Yes   Assessment information obtained from? Patient   Expected Length of Stay (days) (tbd)   Communicated expected length of stay with patient/caregiver yes   Type of Healthcare Directive Received Advance Directive   If Healthcare Directive is received, is it scanned into Epic? yes   Prior to hospitilization cognitive status: Alert/Oriented   Prior to hospitalization functional status: Independent;Assistive Equipment;Needs Assistance   Current cognitive status: Alert/Oriented   Current Functional Status: Independent;Assistive Equipment;Needs Assistance   Arrived From home or self-care   Lives With spouse   Able to Return  to Prior Arrangements yes   Is patient able to care for self after discharge? Unable to determine at this time (comments)   How many people do you have in your home that can help with your care after discharge? 1   Who are your caregiver(s) and their phone number(s)? Love (spouse) [phone]   Patient's perception of discharge disposition home health   Readmission Within The Last 30 Days no previous admission in last 30 days   Patient currently being followed by outpatient case management? No   Patient currently receives home health services? No   Patient previously received home health services and would like to resume services if necessary? Yes  ([facility])   Does the patient currently use HME? Yes   Patient currently receives private duty nursing? No   Patient currently receives any other outside agency services? No   Equipment Currently Used at Home rollator;walker, standard;wheelchair;shower chair;bedside commode;raised toilet;cane, straight   Do you have any problems affording any of your prescribed medications? No   Is the patient taking medications as prescribed? yes   Do you have any financial concerns preventing you from receiving the healthcare you need? No   Does the patient have transportation to healthcare appointments? Yes   Transportation Available family or friend will provide   On Dialysis? Yes   If yes, what is the name of the dialysis unit? [facility] M-W-F 2:15 pm   Does the patient receive outpatient dialysis? Yes   Does the patient receive services at the [facility]? No   Are there any open cases? No   Discharge Plan A Home;Home Health   Discharge Plan B Long-term acute care facility (LTAC)   Patient/Family In Agreement With Plan yes

## 2017-06-20 NOTE — ANESTHESIA PREPROCEDURE EVALUATION
06/20/2017  Terry Cote is a 67 y.o., male with PD site infection scheduled for Dye catheter insertion under MAC.     Last HD yesterday 6/19.     Prev Gen Anes  Multiple prior anesthetics in [facility].  Most recently yesterday 6/19/17 for I&D abdomen under MAC with no issues.     Past Medical History:   Diagnosis Date    Anemia in chronic kidney disease     Arthritis     unable to walk due to arthritis in knees.     Cataract     Coronary artery disease     Diabetes mellitus     Diabetes mellitus, type 2     Diabetic nephropathy 4/22/2015    Diastolic dysfunction     Hypertension     Myocardial infarction 2001    Peritoneal dialysis catheter in place 2014    Secondary hyperparathyroidism     Stroke 2001, 2013, 10/2014    weak, slurred speech, equal hand grasp/equal leg movements    Type 2 diabetes mellitus for many yrs about 25 yrs 4/22/2015    Unspecified disorder of kidney and ureter      Past Surgical History:   Procedure Laterality Date    AV FISTULA PLACEMENT Left 2011    wrist;    CATARACT EXTRACTION      EYE SURGERY      HERNIA REPAIR      NECK SURGERY      PERITONEAL CATHETER INSERTION      PORTACATH PLACEMENT  march 2016    left chest    SPINE SURGERY       No Known Allergies    Pre-op Assessment    I have reviewed the Patient Summary Reports.     I have reviewed the Nursing Notes.   I have reviewed the Medications.     Review of Systems  Anesthesia Hx:  Denies Hx of Anesthetic complications   Denies Personal Hx of Anesthesia complications.   Hematology/Oncology:     Oncology Normal    -- Anemia:   EENT/Dental:EENT/Dental Normal   Cardiovascular:   Exercise tolerance: poor Hypertension Past MI CAD   CHF ECG has been reviewed.    Pulmonary:  Pulmonary Normal    Renal/:   Chronic Renal Disease ( peritoneal dialysis as OPT), ESRD, Dialysis    Hepatic/GI:  Hepatic/GI Normal     Musculoskeletal:   Arthritis     Neurological:   CVA (x3 (2001, 2012, 2014) w/ residual weakness and dysarthria), residual symptoms    Endocrine:   Diabetes, well controlled, using insulin    Dermatological:  Skin Normal    Psych:  Psychiatric Normal           Physical Exam  General:  Well nourished, Obesity    Airway/Jaw/Neck:  Airway Findings: Mouth Opening: Normal Tongue: Normal  General Airway Assessment: Adult  Mallampati: III  Improves to II with phonation.  TM Distance: Normal, at least 6 cm       Chest/Lungs:  Chest/Lungs Findings: Clear to auscultation, Normal Respiratory Rate     Heart/Vascular:  Heart Findings: Rate: Normal  Rhythm: Regular Rhythm  Sounds: Normal      Musculoskeletal:  Musculoskeletal Findings: Normal       2D ECHO 01/04/2016  CONCLUSIONS     1 - Normal left ventricular systolic function (EF 55-60%).     2 - Left ventricular diastolic dysfunction.     3 - Normal right ventricular systolic function .     EKG 4/20/17:  Normal sinus rhythm  Low voltage QRS  Cannot rule out Anterior infarct (cited on or before 06-NOV-2015)  Abnormal ECG  When compared with ECG of 19-APR-2017 11:43,  Borderline criteria for Inferior infarct are no longer Present  Confirmed by Truong RAMIREZ, [facility] ([id number]) on 4/21/2017 10:12:25 AM      Anesthesia Plan  Type of Anesthesia, risks & benefits discussed:  Anesthesia Type:  MAC  Patient's Preference:   Intra-op Monitoring Plan:   Intra-op Monitoring Plan Comments:   Post Op Pain Control Plan:   Post Op Pain Control Plan Comments:   Induction:    Beta Blocker:  Patient is on a Beta-Blocker and has received one dose within the past 24 hours (No further documentation required).       Informed Consent: Patient understands risks and agrees with Anesthesia plan.  Questions answered. Anesthesia consent signed with patient.  ASA Score: 3     Day of Surgery Review of History & Physical: I have interviewed and examined the patient. I have reviewed the patient's H&P dated:  There  are no significant changes.  H&P update referred to the surgeon.  H&P completed by Anesthesiologist.       Ready For Surgery From Anesthesia Perspective.

## 2017-06-20 NOTE — PLAN OF CARE
Problem: Patient Care Overview  Goal: Plan of Care Review  Room air SpO2  98 %. Pt with no apparent distess noted. Will continue to monitor.

## 2017-06-20 NOTE — PLAN OF CARE
Problem: Patient Care Overview  Goal: Plan of Care Review  Pt on respiratory inventions as documented. No distress, sob or increase WOB noted at this time. Will continue to monitor

## 2017-06-21 ENCOUNTER — ANESTHESIA (OUTPATIENT)
Dept: SURGERY | Facility: HOSPITAL | Age: 67
DRG: 901 | End: 2017-06-21
Payer: MEDICARE

## 2017-06-21 VITALS
HEIGHT: 71 IN | TEMPERATURE: 98 F | SYSTOLIC BLOOD PRESSURE: 142 MMHG | DIASTOLIC BLOOD PRESSURE: 65 MMHG | BODY MASS INDEX: 23.52 KG/M2 | HEART RATE: 104 BPM | WEIGHT: 168 LBS | RESPIRATION RATE: 16 BRPM | OXYGEN SATURATION: 96 %

## 2017-06-21 LAB
ABO + RH BLD: NORMAL
ALBUMIN SERPL BCP-MCNC: 2 G/DL
ANION GAP SERPL CALC-SCNC: 7 MMOL/L
BASOPHILS # BLD AUTO: 0.02 K/UL
BASOPHILS NFR BLD: 0.3 %
BLD GP AB SCN CELLS X3 SERPL QL: NORMAL
BUN SERPL-MCNC: 21 MG/DL
CALCIUM SERPL-MCNC: 9 MG/DL
CHLORIDE SERPL-SCNC: 102 MMOL/L
CO2 SERPL-SCNC: 27 MMOL/L
CREAT SERPL-MCNC: 5.1 MG/DL
DIFFERENTIAL METHOD: ABNORMAL
EOSINOPHIL # BLD AUTO: 0.3 K/UL
EOSINOPHIL NFR BLD: 5.4 %
ERYTHROCYTE [DISTWIDTH] IN BLOOD BY AUTOMATED COUNT: 14.5 %
EST. GFR  (AFRICAN AMERICAN): 12 ML/MIN/1.73 M^2
EST. GFR  (NON AFRICAN AMERICAN): 11 ML/MIN/1.73 M^2
FERRITIN SERPL-MCNC: 957 NG/ML
GLUCOSE SERPL-MCNC: 116 MG/DL
HCT VFR BLD AUTO: 23.4 %
HGB BLD-MCNC: 7.3 G/DL
IRON SERPL-MCNC: 16 UG/DL
LYMPHOCYTES # BLD AUTO: 1.2 K/UL
LYMPHOCYTES NFR BLD: 19.9 %
MCH RBC QN AUTO: 24.8 PG
MCHC RBC AUTO-ENTMCNC: 31.2 %
MCV RBC AUTO: 80 FL
MONOCYTES # BLD AUTO: 0.6 K/UL
MONOCYTES NFR BLD: 10.3 %
NEUTROPHILS # BLD AUTO: 3.9 K/UL
NEUTROPHILS NFR BLD: 63.8 %
PHOSPHATE SERPL-MCNC: 3.7 MG/DL
PLATELET # BLD AUTO: 189 K/UL
PMV BLD AUTO: 8.8 FL
POCT GLUCOSE: 114 MG/DL (ref 70–110)
POCT GLUCOSE: 135 MG/DL (ref 70–110)
POCT GLUCOSE: 153 MG/DL (ref 70–110)
POTASSIUM SERPL-SCNC: 4 MMOL/L
RBC # BLD AUTO: 2.94 M/UL
SATURATED IRON: 12 %
SODIUM SERPL-SCNC: 136 MMOL/L
TOTAL IRON BINDING CAPACITY: 129 UG/DL
TRANSFERRIN SERPL-MCNC: 87 MG/DL
WBC # BLD AUTO: 6.12 K/UL

## 2017-06-21 PROCEDURE — 85025 COMPLETE CBC W/AUTO DIFF WBC: CPT

## 2017-06-21 PROCEDURE — 36000706: Performed by: SURGERY

## 2017-06-21 PROCEDURE — 36415 COLL VENOUS BLD VENIPUNCTURE: CPT

## 2017-06-21 PROCEDURE — 94761 N-INVAS EAR/PLS OXIMETRY MLT: CPT

## 2017-06-21 PROCEDURE — C1751 CATH, INF, PER/CENT/MIDLINE: HCPCS | Performed by: SURGERY

## 2017-06-21 PROCEDURE — 25000003 PHARM REV CODE 250: Performed by: SURGERY

## 2017-06-21 PROCEDURE — 71000033 HC RECOVERY, INTIAL HOUR: Performed by: SURGERY

## 2017-06-21 PROCEDURE — 63600175 PHARM REV CODE 636 W HCPCS: Performed by: INTERNAL MEDICINE

## 2017-06-21 PROCEDURE — 86850 RBC ANTIBODY SCREEN: CPT

## 2017-06-21 PROCEDURE — 80048 BASIC METABOLIC PNL TOTAL CA: CPT

## 2017-06-21 PROCEDURE — 36000707: Performed by: SURGERY

## 2017-06-21 PROCEDURE — 80100016 HC MAINTENANCE HEMODIALYSIS

## 2017-06-21 PROCEDURE — 82728 ASSAY OF FERRITIN: CPT

## 2017-06-21 PROCEDURE — 37000009 HC ANESTHESIA EA ADD 15 MINS: Performed by: SURGERY

## 2017-06-21 PROCEDURE — 86900 BLOOD TYPING SEROLOGIC ABO: CPT

## 2017-06-21 PROCEDURE — 83540 ASSAY OF IRON: CPT

## 2017-06-21 PROCEDURE — 25000003 PHARM REV CODE 250: Performed by: NURSE ANESTHETIST, CERTIFIED REGISTERED

## 2017-06-21 PROCEDURE — 0JB80ZZ EXCISION OF ABDOMEN SUBCUTANEOUS TISSUE AND FASCIA, OPEN APPROACH: ICD-10-PCS | Performed by: SURGERY

## 2017-06-21 PROCEDURE — 82040 ASSAY OF SERUM ALBUMIN: CPT

## 2017-06-21 PROCEDURE — 84100 ASSAY OF PHOSPHORUS: CPT

## 2017-06-21 PROCEDURE — 63600175 PHARM REV CODE 636 W HCPCS: Performed by: NURSE ANESTHETIST, CERTIFIED REGISTERED

## 2017-06-21 PROCEDURE — 37000008 HC ANESTHESIA 1ST 15 MINUTES: Performed by: SURGERY

## 2017-06-21 DEVICE — CATH POWERHICKMAN 9.5FR 5CM: Type: IMPLANTABLE DEVICE | Site: NECK | Status: FUNCTIONAL

## 2017-06-21 RX ORDER — BUPIVACAINE HYDROCHLORIDE 2.5 MG/ML
INJECTION, SOLUTION EPIDURAL; INFILTRATION; INTRACAUDAL
Status: DISCONTINUED | OUTPATIENT
Start: 2017-06-21 | End: 2017-06-21 | Stop reason: HOSPADM

## 2017-06-21 RX ORDER — SODIUM CHLORIDE 9 MG/ML
INJECTION, SOLUTION INTRAVENOUS CONTINUOUS PRN
Status: DISCONTINUED | OUTPATIENT
Start: 2017-06-21 | End: 2017-06-21

## 2017-06-21 RX ORDER — LIDOCAINE HYDROCHLORIDE 10 MG/ML
INJECTION, SOLUTION EPIDURAL; INFILTRATION; INTRACAUDAL; PERINEURAL
Status: DISCONTINUED | OUTPATIENT
Start: 2017-06-21 | End: 2017-06-21 | Stop reason: HOSPADM

## 2017-06-21 RX ORDER — HEPARIN SODIUM 1000 [USP'U]/ML
INJECTION, SOLUTION INTRAVENOUS; SUBCUTANEOUS
Status: DISCONTINUED | OUTPATIENT
Start: 2017-06-21 | End: 2017-06-21 | Stop reason: HOSPADM

## 2017-06-21 RX ORDER — GLYCOPYRROLATE 0.2 MG/ML
INJECTION INTRAMUSCULAR; INTRAVENOUS
Status: DISCONTINUED | OUTPATIENT
Start: 2017-06-21 | End: 2017-06-21

## 2017-06-21 RX ORDER — LIDOCAINE HCL/PF 100 MG/5ML
SYRINGE (ML) INTRAVENOUS
Status: DISCONTINUED | OUTPATIENT
Start: 2017-06-21 | End: 2017-06-21

## 2017-06-21 RX ORDER — PROPOFOL 10 MG/ML
VIAL (ML) INTRAVENOUS CONTINUOUS PRN
Status: DISCONTINUED | OUTPATIENT
Start: 2017-06-21 | End: 2017-06-21

## 2017-06-21 RX ORDER — PROPOFOL 10 MG/ML
VIAL (ML) INTRAVENOUS
Status: DISCONTINUED | OUTPATIENT
Start: 2017-06-21 | End: 2017-06-21

## 2017-06-21 RX ORDER — KETAMINE HYDROCHLORIDE 50 MG/ML
INJECTION, SOLUTION INTRAMUSCULAR; INTRAVENOUS
Status: DISCONTINUED | OUTPATIENT
Start: 2017-06-21 | End: 2017-06-21

## 2017-06-21 RX ORDER — PHENYLEPHRINE HYDROCHLORIDE 10 MG/ML
INJECTION INTRAVENOUS
Status: DISCONTINUED | OUTPATIENT
Start: 2017-06-21 | End: 2017-06-21

## 2017-06-21 RX ADMIN — KETAMINE HYDROCHLORIDE 10 MG: 50 INJECTION, SOLUTION, CONCENTRATE INTRAMUSCULAR; INTRAVENOUS at 11:06

## 2017-06-21 RX ADMIN — PROPOFOL 100 MCG/KG/MIN: 10 INJECTION, EMULSION INTRAVENOUS at 11:06

## 2017-06-21 RX ADMIN — PRAVASTATIN SODIUM 40 MG: 40 TABLET ORAL at 08:06

## 2017-06-21 RX ADMIN — GABAPENTIN 300 MG: 300 CAPSULE ORAL at 08:06

## 2017-06-21 RX ADMIN — LISINOPRIL 10 MG: 10 TABLET ORAL at 08:06

## 2017-06-21 RX ADMIN — STANDARDIZED SENNA CONCENTRATE AND DOCUSATE SODIUM 1 TABLET: 8.6; 5 TABLET, FILM COATED ORAL at 08:06

## 2017-06-21 RX ADMIN — Medication 1 CAPSULE: at 08:06

## 2017-06-21 RX ADMIN — PROPOFOL 30 MG: 10 INJECTION, EMULSION INTRAVENOUS at 11:06

## 2017-06-21 RX ADMIN — ERYTHROPOIETIN 10000 UNITS: 10000 INJECTION, SOLUTION INTRAVENOUS; SUBCUTANEOUS at 05:06

## 2017-06-21 RX ADMIN — SODIUM CHLORIDE: 0.9 INJECTION, SOLUTION INTRAVENOUS at 10:06

## 2017-06-21 RX ADMIN — ALLOPURINOL 100 MG: 100 TABLET ORAL at 08:06

## 2017-06-21 RX ADMIN — INSULIN ASPART 8 UNITS: 100 INJECTION, SUSPENSION SUBCUTANEOUS at 06:06

## 2017-06-21 RX ADMIN — CARVEDILOL 3.12 MG: 3.12 TABLET, FILM COATED ORAL at 06:06

## 2017-06-21 RX ADMIN — GLYCOPYRROLATE 0.2 MG: 0.2 INJECTION, SOLUTION INTRAMUSCULAR; INTRAVENOUS at 11:06

## 2017-06-21 RX ADMIN — PHENYLEPHRINE HYDROCHLORIDE 100 MCG: 10 INJECTION INTRAVENOUS at 11:06

## 2017-06-21 RX ADMIN — LIDOCAINE HYDROCHLORIDE 80 MG: 20 INJECTION, SOLUTION INTRAVENOUS at 11:06

## 2017-06-21 RX ADMIN — CARVEDILOL 3.12 MG: 3.12 TABLET, FILM COATED ORAL at 08:06

## 2017-06-21 RX ADMIN — ERYTHROPOIETIN 10000 UNITS: 10000 INJECTION, SOLUTION INTRAVENOUS; SUBCUTANEOUS at 08:06

## 2017-06-21 NOTE — PLAN OF CARE
Problem: Patient Care Overview  Goal: Plan of Care Review  Outcome: Ongoing (interventions implemented as appropriate)  Received pt on RA; no distress noted.

## 2017-06-21 NOTE — TRANSFER OF CARE
"Anesthesia Transfer of Care Note    Patient: Terry Cote    Procedure(s) Performed: Procedure(s) (LRB):  INSERTION-CATHETER-GRAY (Right)    Patient location: PACU    Anesthesia Type: MAC    Transport from OR: Transported from OR on room air with adequate spontaneous ventilation    Post pain: adequate analgesia    Post assessment: no apparent anesthetic complications and tolerated procedure well    Post vital signs: stable    Level of consciousness: awake, alert and oriented    Nausea/Vomiting: no nausea/vomiting    Complications: none    Transfer of care protocol was followed      Last vitals:   Visit Vitals  BP (!) 146/66 (Patient Position: Lying, BP Method: Automatic)   Pulse 88   Temp 36.4 °C (97.6 °F) (Oral)   Resp 18   Ht 5' 11" (1.803 m)   Wt 76.2 kg (168 lb)   SpO2 98%   BMI 23.43 kg/m²     "

## 2017-06-21 NOTE — PLAN OF CARE
Problem: Patient Care Overview  Goal: Plan of Care Review  Outcome: Ongoing (interventions implemented as appropriate)  Pt in NAD. AAOX4 Scheduled meds given per MAR. PIV saline locked flushed for patency dressing CDI. Pt denies pain. Wound vac to RUQ with scant drainage. L wrist fistula positive for thrill/bruit. Accu-checks WDL. NPO status maintained. Contact precautions maintained. Encouraged to call for assistance verbalized understanding. Safety maintained bed in low locked position, SR up X2, bed alarm on, and call bell in reach. Will continue to monitor.

## 2017-06-21 NOTE — ANESTHESIA RELEASE NOTE
"Anesthesia Release from PACU Note    Patient: Terry Cote    Procedure(s) Performed: Procedure(s) (LRB):  INSERTION-CATHETER-GRAY (Right)    Anesthesia type: general    Post pain: Adequate analgesia    Post assessment: no apparent anesthetic complications    Last Vitals:   Visit Vitals  BP (!) 119/57   Pulse 92   Temp 36.8 °C (98.2 °F) (Skin)   Resp 10   Ht 5' 11" (1.803 m)   Wt 76.2 kg (168 lb)   SpO2 98%   BMI 23.43 kg/m²       Post vital signs: stable    Level of consciousness: awake    Nausea/Vomiting: no nausea/no vomiting    Complications: none    Airway Patency: patent    Respiratory: unassisted, spontaneous ventilation    Cardiovascular: stable and blood pressure at baseline    Hydration: euvolemic  "

## 2017-06-21 NOTE — PROGRESS NOTES
TN spoke with MD -   pt for d/c to home wiht 3 iv abx as well as wound vac for abd wound.    TN met with pt's wife and with pt -     [place] [facility] [place] [facility]   TN spoke with Airam - she will evaluate pt.

## 2017-06-21 NOTE — PROGRESS NOTES
received message from [facility] admit coordinator, Airam patient can admit to room 568 and Dr Pride is the accepting physician. Airam gave permission for nurse to call report charge nurse.       informed bedside nurse, Ianlynn patient is scheduled to discharge today to [facility] room 568.  The doctor will complete discharge orders around 6:00pm.  Bedside nurse informed she can call [phone] at anytime.

## 2017-06-21 NOTE — PROGRESS NOTES
received phone call from [facility] admit coordinator, Airam stating patient is accepted for admission today.   She reported that patient can admit after she review transfer facility orders.

## 2017-06-21 NOTE — ANESTHESIA POSTPROCEDURE EVALUATION
"Anesthesia Post Evaluation    Patient: Terry Cote    Procedure(s) Performed: Procedure(s) (LRB):  INSERTION-CATHETER-GRAY (Right)    Final Anesthesia Type: general  Patient location during evaluation: PACU  Patient participation: Yes- Able to Participate  Level of consciousness: awake and alert  Post-procedure vital signs: reviewed and stable  Pain management: adequate  Airway patency: patent  PONV status at discharge: No PONV  Anesthetic complications: no      Cardiovascular status: hemodynamically stable  Respiratory status: spontaneous ventilation  Hydration status: euvolemic  Follow-up not needed.        Visit Vitals  BP (!) 119/57   Pulse 92   Temp 36.8 °C (98.2 °F) (Skin)   Resp 10   Ht 5' 11" (1.803 m)   Wt 76.2 kg (168 lb)   SpO2 98%   BMI 23.43 kg/m²       Pain/Deandre Score: Pain Assessment Performed: Yes (6/21/2017 11:59 AM)  Presence of Pain: non-verbal indicators absent (6/21/2017 11:59 AM)  Pain Rating Prior to Med Admin: 7 (6/20/2017  2:40 PM)  Pain Rating Post Med Admin: 4 (6/20/2017  3:36 PM)  Deandre Score: 8 (6/21/2017 11:59 AM)  Modified Deandre Score: 18 (6/21/2017 11:59 AM)      "

## 2017-06-21 NOTE — PLAN OF CARE
Received from OR per bed. Oral airway in place. Monitors applied. O2 sat 98% on room air. Sedated. VSS. No family in waiting room when called. See flow sheets.

## 2017-06-21 NOTE — PLAN OF CARE
Problem: Hemodialysis (Adult)  Goal: Signs and Symptoms of Listed Potential Problems Will be Absent, Minimized or Managed (Hemodialysis)  Signs and symptoms of listed potential problems will be absent, minimized or managed by discharge/transition of care (reference Hemodialysis (Adult) CPG).   Outcome: Ongoing (interventions implemented as appropriate)   06/21/17 1355   Hemodialysis   Problems Assessed (Hemodialysis) electrolyte imbalance;fluid imbalance   Problems Present (Hemodialysis) electrolyte imbalance;fluid imbalance   Hd with uf

## 2017-06-21 NOTE — PLAN OF CARE
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       Allergies:Review of patient's allergies indicates:  No Known Allergies    Diet: renal diet    Activities: Activity as tolerated    Nursing: in/outs q8hrs, oob to chair for meals, routine line care (sterile dressing change)  CONTACT PRECAUTIONS, Multidrug resistant bacteria  Labs: CBC, CMP and CRP weekly     CONSULTS:    Physical Therapy to evaluate and treat.  and  to evaluate for community resources/long-range planning.     Consult renal for HD  Consult ID for Antibiotic regimen, currently pending    MISCELLANEOUS CARE:  Diabetes Care:   SN to perform and educate Diabetic management with blood glucose monitoring:, Fingerstick blood sugar a.m. and p.m. and Report CBG < 60 or > 350 to physician.    WOUND CARE ORDERS  Yes: Surgical Wound:  Location: abdominal wound  Vac change Monday, Wednesday, Friday.   Cleanse wound with dakins, apply vac 75mmhg neg pressure continuous      Consult ET nurse        Apply the following to wound:   Other: see above (frequency)    Medications: Review discharge medications with patient and family and provide education.      Current Discharge Medication List      CONTINUE these medications which have NOT CHANGED    Details   gabapentin (NEURONTIN) 300 MG capsule Take 1 capsule (300 mg total) by mouth every evening.  Qty: 30 capsule, Refills: 3      insulin aspart protamine-insulin aspart (NOVOLOG 70/30) 100 unit/mL (70-30) InPn pen  Inject 5 Units into the skin 3 (three) times daily with meals.  Qty: 3 mL, Refills: 11      vitamin renal formula, B-complex-vitamin c-folic acid, (NEPHROCAP) 1 mg Cap Take 1 capsule by mouth once daily.  Qty: 90 capsule, Refills: 1      allopurinol (ZYLOPRIM) 100 MG tablet Take 1 tablet (100 mg total) by mouth once daily.  Qty: 30 tablet, Refills: 1      aspirin (ECOTRIN) 81 MG EC tablet Take 1 tablet (81 mg total) by mouth once daily.  Qty: 30 tablet, Refills: 3      carvedilol (COREG) 3.125 MG tablet Take 1 tablet (3.125 mg total) by mouth 2 (two) times daily with meals.  Qty: 60 tablet, Refills: 11    Associated Diagnoses: Diastolic dysfunction      clopidogrel (PLAVIX) 75 mg tablet Take 1 tablet (75 mg total) by mouth once daily.  Qty: 90 tablet, Refills: 3    Associated Diagnoses: Coronary artery disease involving native coronary artery of native heart without angina pectoris      cyproheptadine (PERIACTIN) 4 mg tablet Take 1 tablet (4 mg total) by mouth 3 (three) times daily as needed.  Qty: 90 tablet, Refills: 3      docusate sodium (COLACE) 100 MG capsule Take 1 capsule (100 mg total) by mouth 2 (two) times daily as needed for Constipation.  Qty: 100 capsule, Refills: 0      EX-LAX, SENNOSIDES, ORAL Take by mouth as needed. As directed      lisinopril 10 MG tablet Take 1 tablet (10 mg total) by mouth once daily.  Qty: 90 tablet, Refills: 3      NYSTOP powder APPLY TO AFFECTED AREA Q 6 H TO SITE  Refills: 0      ondansetron (ZOFRAN-ODT) 4 MG TbDL Refills: 1      potassium chloride SA (K-DUR,KLOR-CON) 20 MEQ tablet TK 1 T PO ONCE  D  Refills: 2      pravastatin (PRAVACHOL) 80 MG tablet Take 40 mg by mouth once daily.      senna-docusate 8.6-50 mg (PERICOLACE) 8.6-50 mg per tablet Take 1 tablet by mouth 2 (two) times daily.  Qty: 30 tablet, Refills: 0         STOP taking these medications       amoxicillin-clavulanate 500-125mg (AUGMENTIN) 500-125 mg Tab Comments:   Reason for Stopping:         doxycycline  (VIBRA-TABS) 100 MG tablet Comments:   Reason for Stopping:         fluconazole (DIFLUCAN) 200 MG Tab Comments:   Reason for Stopping:         sulfamethoxazole-trimethoprim 400-80mg (BACTRIM,SEPTRA) 400-80 mg per tablet Comments:   Reason for Stopping:                    _________________________________  Louisa Zuniga MD  06/21/2017

## 2017-06-21 NOTE — OP NOTE
DATE OF PROCEDURE:  06/19/2017    PREOPERATIVE DIAGNOSIS:  Mycobacterium infection.    POSTOPERATIVE DIAGNOSIS:  Mycobacterium infection.    PROCEDURE:  Abdominal wall excisional debridement.    SURGEON:  Louisa Zuniga M.D.    ASSISTANT:  None.    ANESTHESIA:  Local MAC.    PREP:  Betadine.    INDICATIONS:  The patient is a 67-year-old male known to me for peritoneal   dialysis catheter placement.  He had subsequent infections, which required   removal of the catheter earlier this year.  The cultures from that procedure   grew out Mycobacterium, highly resistant.  He was evaluated in conjunction with   Infectious Disease and nonhealing wound was recultured to contain Mycobacterium.    Aggressive surgical debridement was discussed in addition to long-term IV   antibiotics.  The risks of the procedure were discussed with the patient   including bleeding, infection, pain, scarring, wound complications, injury to   local structures, recurrence and potential need for further surgery.  The   patient demonstrated understanding of these risks and consent form was obtained.    PROCEDURE IN DETAIL:  The patient was identified in the Preoperative Unit and   taken back to the Operating Room, laid supine on the operating table.  No IV   antibiotics were administered prior to the administration of anesthesia.    Anesthesia was administered without complication.  The patient was then prepped   and draped in a standard sterile fashion.  A timeout procedure was performed in   accordance with the hospital protocol.  The left lower quadrant abdominal wall   wound was probed and some nonviable tissue was encountered.  The tissues easily    with blunt dissection.  This revealed necrotic subcutaneous tissue.    The necrotic tissue was sharply excised with Manriquez scissor until viable tissue   was encountered.  This subcutaneous tissue was involved to the level of the   anterior fascia.  The fascia was incised and explored and the  muscle did not   appear to be involved.  The final wound defect measured 6 x 1.5 x 2 cm.  It was   packed with damp-to-dry gauze after hemostasis was achieved.  Tissues were sent   for culture.  The patient was awakened from anesthesia without complication and   returned to the Postop Recovery Unit in stable condition.  At the end of case,   sponge, instrument and needle counts were correct on 2 occasions.  I was present   and scrubbed throughout the entirety of the case.    COMPLICATIONS:  None.    CONDITION:  Stable.      WERNER  dd: 06/21/2017 10:51:34 (CDT)  td: 06/21/2017 11:47:46 (CDT)  Doc ID   #[id number]  Job ID #[id number]    CC:

## 2017-06-21 NOTE — PROGRESS NOTES
Transition navigator, Xiomy Le reported she notified Dr. Zuniga that patient has been accepted for admission to [facility] today after transfer facility orders/discharge orders are complete.  Dr. Zuniga said she will complete orders around 6:00pm.

## 2017-06-21 NOTE — PLAN OF CARE
Transported to [facility] from PACU via bed by RN.  No distress.  No family in waiting room.

## 2017-06-21 NOTE — OP NOTE
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    No IV antibiotics were administered prior to the administration of the anesthesia.    MAC anesthesia was administered without complication.  The patient was then   prepped and draped in a standard sterile fashion.  Timeout procedure was   performed in accordance with hospital protocol.  An ultrasound was used to   identify the right internal jugular vein.  The images were interpreted by me   and stored for further review.  The internal jugular vein was accessed using a   needle. The wire was passed without difficulty, we confirmed appropriate positioning  Using fluoroscopic guidance.  An incision in the right upper chest was   made approximately 0.5 cm using a #15 blade scalpel.  The tunneler was passed   from this incision towards the neck incision without any issue.  Once the   catheter cuff was in the appropriate position at the chest site, the initial   dilator was passed over the wire and confirmed with fluoroscopic guidance.  The   internal jugular vein was then sequentially dilated and once the final dilator   was passed, the peel-away sheath was then inserted over the wire using Seldinger   technique.  The wire and dilator were removed.  The catheter was then placed   into the peel-away sheath and peel-away  sheath was removed while the catheter   was threaded forward.  Once this was complete, final fluoroscopic image did   confirm that the catheter was in appropriate position in the distal SVC right   atrial junction.  Both ports of the catheter withdrew and flushed very easily.    Each port was then locked with 2.5 mL of 1000 units per mL IV heparin.  The neck   incision was closed using 5-0 Monocryl suture in an interrupted fashion.  The   chest incision was closed using 5-0 Monocryl suture in an interrupted fashion.    The catheter was fixed to the right chest wall using 3-0 nylon suture.  Dry   sterile dressings were then applied.  The patient tolerated the procedure well   and there were no complications.  He was awakened from anesthesia and returned   to the Postoperative Recovery Unit in stable condition.  At the end of the case,   hemostasis was confirmed and sponge, instrument and needle counts were correct   on 2 occasions.  I was present and scrubbed throughout the entirety of the case.    During all vein manipulation, the patient was in the Trendelenburg position.      Significant Surgical Tasks Conducted by the Assistant(s), if Applicable: n/a    Complications: No    Estimated Blood Loss (EBL):  Minimal           Implants:   Implant Name Type Inv. Item Serial No.  Lot No. LRB No. Used   CATH GRAY 9.5FR 5CM CUFF. - [id number]   CATH GRAY 9.5FR 5CM CUFF.   [employer] [id number] Right 1       Specimens:   Specimen (12h ago through future)    None                  Condition: Good    Disposition: PACU - hemodynamically stable.    Attestation: I was present and scrubbed for the entire procedure.

## 2017-06-21 NOTE — PROGRESS NOTES
Progress Note  Nephrology      Consult Requested By: Louisa Zuniga MD      SUBJECTIVE:     Overnight events  Patient is a 67 y.o. male     Patient Active Problem List   Diagnosis    Unspecified transient cerebral ischemia    CHF with left ventricular diastolic dysfunction, NYHA class 1    Diabetes mellitus, type 2    Dyslipidemia associated with type 2 diabetes mellitus    Hyperuricemia    ESRD on dialysis    Organ transplant candidate    Pre-transplant evaluation for chronic kidney disease    Type 2 diabetes mellitus for many yrs about 25 yrs    Diabetic nephropathy    Secondary hyperparathyroidism    Anemia in chronic kidney disease    Neuropathy    Stroke    Elevated PSA    Type 2 diabetes mellitus with chronic kidney disease on chronic dialysis, without long-term current use of insulin    Vitamin D deficiency    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     Cataract     Coronary artery disease     Diabetes mellitus     Diabetes mellitus, type 2     Diabetic nephropathy 4/22/2015    Diastolic dysfunction     Hypertension     Myocardial infarction 2001    Peritoneal dialysis catheter in place 2014    Secondary hyperparathyroidism     Stroke 2001, 2013, 10/2014    weak, slurred speech, equal hand grasp/equal leg movements    Type 2 diabetes mellitus for many yrs about 25 yrs 4/22/2015    Unspecified disorder of  kidney and ureter               OBJECTIVE:     Vitals:    06/21/17 1210 06/21/17 1215 06/21/17 1230 06/21/17 1240   BP: (!) 119/57 (!) 113/55 (!) 117/59 131/61   BP Location:       Patient Position:       BP Method:       Pulse: 92 88 88 88   Resp: 10 19 12 18   Temp:    98.6 °F (37 °C)   TempSrc:    Skin   SpO2: 98% 99% 99% 99%   Weight:       Height:           Temp: 98.6 °F (37 °C) (06/21/17 1240)  Pulse: 88 (06/21/17 1240)  Resp: 18 (06/21/17 1240)  BP: 131/61 (06/21/17 1240)  SpO2: 99 % (06/21/17 1240)      Date 06/21/17 0700 - 06/22/17 0659   Shift 2563-2564 1229-5360 9730-9229 24 Hour Total   I  N  T  A  K  E   I.V.  (mL/kg) 100  (1.3)   100  (1.3)    Shift Total  (mL/kg) 100  (1.3)   100  (1.3)   O  U  T  P  U  T   Shift Total  (mL/kg)       Weight (kg) 76.2 76.2 76.2 76.2             Medications:   sodium chloride 0.9%   Intravenous Once    sodium chloride 0.9%   Intravenous Once    allopurinol  100 mg Oral Daily    aspirin  81 mg Oral Daily    carvedilol  3.125 mg Oral BID WM    clopidogrel  75 mg Oral Daily    [START ON 6/23/2017] epoetin rosenda (PROCRIT) injection  20,000 Units Intravenous Every Mon, Wed, Fri    gabapentin  300 mg Oral Daily    insulin aspart protamine-insulin aspart  8 Units Subcutaneous TID WM    lisinopril  10 mg Oral Daily    pravastatin  40 mg Oral Daily    senna-docusate 8.6-50 mg  1 tablet Oral BID    vitamin renal formula (B-complex-vitamin c-folic acid)  1 capsule Oral Daily              Seen on dialysis  No c/o  Physical Exam:  General appearance: NAD  Lungs: diminished breath sounds  Heart: Pulse 92  /64  Abdomen: soft  Extremities: no edema  Skin: dry    Laboratory:  ABG  Labs reviewed  Recent Results (from the past 336 hour(s))   Basic metabolic panel    Collection Time: 06/21/17  6:09 AM   Result Value Ref Range    Sodium 136 136 - 145 mmol/L    Potassium 4.0 3.5 - 5.1 mmol/L    Chloride 102 95 - 110 mmol/L    CO2 27 23 - 29 mmol/L    BUN, Bld 21 8 - 23  mg/dL    Creatinine 5.1 (H) 0.5 - 1.4 mg/dL    Calcium 9.0 8.7 - 10.5 mg/dL    Anion Gap 7 (L) 8 - 16 mmol/L   Basic metabolic panel    Collection Time: 06/21/17  6:09 AM   Result Value Ref Range    Sodium 136 136 - 145 mmol/L    Potassium 4.0 3.5 - 5.1 mmol/L    Chloride 102 95 - 110 mmol/L    CO2 27 23 - 29 mmol/L    BUN, Bld 21 8 - 23 mg/dL    Creatinine 5.1 (H) 0.5 - 1.4 mg/dL    Calcium 9.0 8.7 - 10.5 mg/dL    Anion Gap 7 (L) 8 - 16 mmol/L   Basic metabolic panel    Collection Time: 06/21/17  6:09 AM   Result Value Ref Range    Sodium 136 136 - 145 mmol/L    Potassium 4.0 3.5 - 5.1 mmol/L    Chloride 102 95 - 110 mmol/L    CO2 27 23 - 29 mmol/L    BUN, Bld 21 8 - 23 mg/dL    Creatinine 5.1 (H) 0.5 - 1.4 mg/dL    Calcium 9.0 8.7 - 10.5 mg/dL    Anion Gap 7 (L) 8 - 16 mmol/L     Recent Results (from the past 336 hour(s))   CBC auto differential    Collection Time: 06/21/17  6:09 AM   Result Value Ref Range    WBC 6.12 3.90 - 12.70 K/uL    Hemoglobin 7.3 (L) 14.0 - 18.0 g/dL    Hematocrit 23.4 (L) 40.0 - 54.0 %    Platelets 189 150 - 350 K/uL     Urinalysis  No results for input(s): COLORU, CLARITYU, SPECGRAV, PHUR, PROTEINUA, GLUCOSEU, BILIRUBINCON, BLOODU, WBCU, RBCU, BACTERIA, MUCUS, NITRITE, LEUKOCYTESUR, UROBILINOGEN, HYALINECASTS in the last 24 hours.    Diagnostic Results:  X-Ray: Reviewed  US: Reviewed  Echo: Reviewed  ACCESS    ASSESSMENT/PLAN:   Mycobacterium abscessus infection  Abd wall infection from PD cath insertion site.  ESRD  MBD  Anemia  Epogen  Check iron  Poor nutrition  Regular diet   /64  Hemodialysis in progress

## 2017-06-21 NOTE — PHYSICIAN QUERY
PT Name: Terry Cote  MR #: [id number]    Physician Query Form - Nutrition Clarification     CDS/: Brianna Sawyer               Contact information: [email or web address]    This form is a permanent document in the medical record.     Query Date: June 21, 2017    By submitting this query, we are merely seeking further clarification of documentation.. Please utilize your independent clinical judgment when addressing the question(s) below.    The Medical record contains the following:   Indicators  Supporting Clinical Findings Location in Medical Record    % of Estimated Energy Intake over a time frame from p.o., TF, or TPN      Weight Status over a time frame      Subcutaneous Fat and/or Muscle Loss      Fluid Accumulation or Edema      Reduced  Strength      Wt / BMI / Usual Body Weight     x Delayed Wound Healing / Failure to Thrive Has abdominal wound that is still not healed and this contains  mycobacterium H&P   x Acute or Chronic Illness M abscessus infection of PD exit site. S/p debridement.     ESRD on dialysis  Organ transplant candidate  CHF  Type 2 diabetes mellitus  Peritoneal dialysis catheter site infection  Cutaneous abscess of abdominal wall Infectious Disease Consult Note Attestation    Nephrology PN 06/20    Medication     x Treatment Supplements  Weight M-W-F    Novasource Renal Supplement 3 times daily Nephrology Consult Note      Physician Orders   x Other Poor Nutrition    Malnutrition        Albumin= 2.0 Nephrology Consult Note    Nephrology PN 06/20, Patient Active Problem List Diagnosis    Labs, Chem Profile 06/21     AND / ASPEN Clinical Characteristics (October 2011)  A minimum of two characteristics is recommended for diagnosing either moderate or severe malnutrition   Mild Malnutrition Moderate Malnutrition Severe Malnutrition   Energy Intake from p.o., TF or TPN. < 75% intake of estimated energy needs for less than 7 days < 75% intake of estimated energy needs for  greater than 7 days < 50% intake of estimated energy needs for > 5 days   Weight Loss 1-2% in 1 month  5% in 3 months  7.5% in 6 months  10% in 1 year 1-2 % in 1 week  5% in 1 month  7.5% in 3 months  10% in 6 months  20% in 1 year > 2% in 1 week  > 5% in 1 month  > 7.5% in 3 months  > 10% in 6 months  > 20% in 1 year   Physical Findings     None *Mild subcutaneous fat and/or muscle loss  *Mild fluid accumulation  *Stage II decubitus  *Surgical wound or non-healing wound *Mod/severe subcutaneous fat and/or muscle loss  *Mod/severe fluid accumulation  *Stage III or IV decubitus  *Non-healing surgical wound     Provider, please specify diagnosis or diagnoses associated with above clinical findings.      [ ] Mild Protein-Calorie Malnutrition  [ xx] Moderate Protein-Calorie Malnutrition  [ ] Other Nutritional Diagnosis (please specify): ____________________________________  [ ] Other: ________________________________  [ ] Clinically Undetermined    Please document in your progress notes daily for the duration of treatment until resolved and include in your discharge summary.

## 2017-06-22 LAB — BACTERIA SPEC AEROBE CULT: NO GROWTH

## 2017-06-22 NOTE — PROGRESS NOTES
[facility] ID Progress Note    Consultant Attending: Dr. Negro  Consultant Resident: Dr. Juarez    Subjective:      Patient feeling well today. He had a port placed and received HD. He is anxious to start abx.      Objective:   Last 24 Hour Vital Signs:  BP  Min: 85/49  Max: 146/66  Temp  Av.4 °F (36.9 °C)  Min: 97.6 °F (36.4 °C)  Max: 99.5 °F (37.5 °C)  Pulse  Av.2  Min: 80  Max: 120  Resp  Av.4  Min: 10  Max: 19  SpO2  Av.3 %  Min: 96 %  Max: 100 %  I/O last 3 completed shifts:  In: 500 [I.V.:100; Other:400]  Out: 1900 [Other:1900]    Physical Examination:  Constitutional: Sitting comfortably. No distress.   HENT:   Head: Normocephalic and atraumatic.   Mouth/Throat: Oropharynx is clear and moist. No teeth   Eyes: Conjunctivae and EOM are normal. Pupils are equal, round, and reactive to light.   Neck: Normal range of motion. Neck supple. No JVD present.   Cardiovascular: Normal rate, regular rhythm and normal heart sounds. Faint pedal pulses   Pulmonary/Chest: Effort normal and breath sounds normal.   Abdominal: Soft. Bowel sounds are normal. He exhibits no distension. There is no tenderness.   Midline wound to wound vac; nontender; no redness   Genitourinary:   Genitourinary Comments: No sargent   Musculoskeletal: Normal range of motion. He exhibits no edema or tenderness.   LUE AVF; RUE PIV   Neurological: He is alert and oriented to person, place, and time. Coordination normal.   Dysarthria; left mild paresis   Skin:   As above   Psychiatric:   Appropriate and cooperative   Nursing note and vitals reviewed.         Laboratory Results:  Most Recent Data:  CBC: Lab Results   Component Value Date    WBC 6.12 2017    HGB 7.3 (L) 2017    HCT 23.4 (L) 2017     2017    MCV 80 (L) 2017    RDW 14.5 2017     BMP: Lab Results   Component Value Date     2017     2017     2017    K 4.0 2017    K 4.0 2017    K 4.0  06/21/2017     06/21/2017     06/21/2017     06/21/2017    CO2 27 06/21/2017    CO2 27 06/21/2017    CO2 27 06/21/2017    BUN 21 06/21/2017    BUN 21 06/21/2017    BUN 21 06/21/2017     (H) 06/21/2017     (H) 06/21/2017     (H) 06/21/2017    CALCIUM 9.0 06/21/2017    CALCIUM 9.0 06/21/2017    CALCIUM 9.0 06/21/2017    MG 1.7 04/20/2017    PHOS 3.7 06/21/2017     LFTs: Lab Results   Component Value Date    PROT 7.5 04/22/2017    ALBUMIN 2.0 (L) 06/21/2017    BILITOT 0.6 04/22/2017    AST 17 04/22/2017    ALKPHOS 84 04/22/2017    ALT 15 04/22/2017     Coags:   Lab Results   Component Value Date    INR 1.0 04/19/2017     FLP: Lab Results   Component Value Date    CHOL 82 (L) 06/06/2016    HDL 37 (L) 06/06/2016    LDLCALC 34.6 (L) 06/06/2016    TRIG 52 06/06/2016    CHOLHDL 45.1 06/06/2016     DM: Lab Results   Component Value Date    HGBA1C 5.5 02/08/2017    HGBA1C 6.0 11/04/2016    HGBA1C 6.4 (H) 06/07/2016    LDLCALC 34.6 (L) 06/06/2016    CREATININE 5.1 (H) 06/21/2017    CREATININE 5.1 (H) 06/21/2017    CREATININE 5.1 (H) 06/21/2017     Thyroid: Lab Results   Component Value Date    TSH 0.484 04/19/2017    FREET4 1.03 12/18/2014     Anemia: Lab Results   Component Value Date    IRON 31 (L) 11/04/2016    TIBC 71 (L) 11/04/2016    FERRITIN 957 (H) 06/21/2017    ANVZCMPY90 1718 (H) 11/04/2016    FOLATE 18.2 11/04/2016     Cardiac: Lab Results   Component Value Date    TROPONINI 0.015 02/12/2017    BNP 24 04/19/2017     Urinalysis: Lab Results   Component Value Date    LABURIN No significant growth 04/19/2017    COLORU Yellow 04/19/2017    SPECGRAV 1.025 04/19/2017    NITRITE Negative 04/19/2017    KETONESU Negative 04/19/2017    UROBILINOGEN Negative 04/19/2017       Microbiology Data:  CX from 6/19 wound washout pending     Assessment:     Terry Cote is a 67 y.o. male with:     Plan:     Mycobacterium abscessus infection  -Abd wall infection from PD cath insertion site.  Clinically ok with wound vac. Needs at least 3 drug Rx, will likely use amikacin, tigecycline, imipenem, cefoxitin I. Will place orders.    -Port placed 6/21  -Patient to be transferred to [facility]     Thank you for allowing us to participate in the care of this patient.All recs d/w staff. Will cont to follow. Please contact me if you have any questions regarding this consult.    Rosana Juarez  [facility]

## 2017-06-26 LAB — BACTERIA SPEC ANAEROBE CULT: NORMAL

## 2017-06-27 ENCOUNTER — TELEPHONE (OUTPATIENT)
Dept: SURGERY | Facility: CLINIC | Age: 67
End: 2017-06-27

## 2017-06-27 NOTE — TELEPHONE ENCOUNTER
----- Message from Maria Luisa Royal sent at 6/27/2017  8:56 AM CDT -----  No. [phone]   Patient called for the name of his Infectious Disease physician.  He said it was not Dr. Danielle Jimenez.    Please call.    06/27/17   0965  Attempted to contact the patient to inform

## 2017-06-29 ENCOUNTER — TELEPHONE (OUTPATIENT)
Dept: SURGERY | Facility: CLINIC | Age: 67
End: 2017-06-29

## 2017-06-29 NOTE — TELEPHONE ENCOUNTER
06/29/17    7008  Attempted to contact pt regarding concern of not having seen Dr. Zuniga. Pt informed that Dr. Zuniga made a round, but at the time pt was asleep per Dr. Zuniga. No answer. Left voicemail.

## 2017-06-30 ENCOUNTER — TELEPHONE (OUTPATIENT)
Dept: SURGERY | Facility: CLINIC | Age: 67
End: 2017-06-30

## 2017-06-30 NOTE — TELEPHONE ENCOUNTER
06/30/17    1051  Received call from Cierra with Lab stating patient has positive AFB with 15 colonies present from an Abdominal Wound on 06/19/17

## 2017-07-06 ENCOUNTER — TELEPHONE (OUTPATIENT)
Dept: SURGERY | Facility: CLINIC | Age: 67
End: 2017-07-06

## 2017-07-06 NOTE — TELEPHONE ENCOUNTER
----- Message from Marifer Murillo sent at 7/6/2017  1:39 PM CDT -----  Contact: [phone]  Pt requesting a nurse call back. Please advise.    07/06/17     1341  Spoke to pt who states he spoke to Dr. Barreto, and was told that he would be in the hospital another 3 weeks. Pt asked if I knew anything about that. I informed the pt that I was not aware, but that Dr. Zuniga was in the OR, and would be able to give more information than I would regarding possible discharge and length of stay. Pt verbalized understanding,and states he was just wondering.

## 2017-07-07 NOTE — DISCHARGE SUMMARY
[facility]  Discharge Note  Short Stay    Admit Date: 6/19/2017    Discharge Date and Time: 6/21/2017  6:52 PM     Attending Physician: No att. providers found     Discharge Provider: Louisa Zuniga    Diagnoses:  Active Hospital Problems    Diagnosis  POA    *Mycobacterium abscessus infection [A31.9]  Yes      Resolved Hospital Problems    Diagnosis Date Resolved POA   No resolved problems to display.       Discharged Condition: fair    Hospital Course: Patient was admitted for an outpatient procedure and tolerated the procedure well with no complications.  He was admitted for postoperative wound VAC placement and initiation of IV antibiotics.  A few days postoperatively after infection was eradicated and antibiotics initiated a Dye catheter was placed.  He was discharged to LTAC for further care.    Final Diagnoses: Same as principal problem.    Disposition:ltac      Follow up/Patient Instructions:    Medications:  Transfer Medications (for Discharge Readmit only):   No current facility-administered medications for this encounter.      No current outpatient prescriptions on file.     Facility-Administered Medications Ordered in Other Encounters   Medication Dose Route Frequency Provider Last Rate Last Dose    0.9%  NaCl infusion   Intravenous PRN Yamile Trujillo MD        0.9%  NaCl infusion   Intravenous Once Yamile Trujillo MD        epoetin rosenda injection 20,000 Units  20,000 Units Intravenous Every Tues, Thurs, Sat Yamile Trujillo MD   20,000 Units at 07/06/17 1012     No discharge procedures on file.  Follow-up Information     [facility] On 6/19/2017.    Specialty:  LTAC  Why:  LTAC  Contact information:  Penelope ARCHER  [address] [id number] [phone]                   Discharge Procedure Orders (must include Diet, Follow-up, Activity):  No discharge procedures on file.

## 2017-07-12 ENCOUNTER — TELEPHONE (OUTPATIENT)
Dept: SURGERY | Facility: CLINIC | Age: 67
End: 2017-07-12

## 2017-07-12 NOTE — TELEPHONE ENCOUNTER
----- Message from Hanna Hernandes sent at 7/12/2017  8:37 AM CDT -----  Contact: Self [phone]  Patient is calling to talk to nurse has personal questions. Please advice       07/12/17     0854  Attempted to call pt. Could not leave message due to his mailbox being full.

## 2017-07-13 ENCOUNTER — TELEPHONE (OUTPATIENT)
Dept: SURGERY | Facility: CLINIC | Age: 67
End: 2017-07-13

## 2017-07-13 NOTE — TELEPHONE ENCOUNTER
----- Message from Marifer Murillo sent at 7/13/2017  7:41 AM CDT -----  Contact: [phone]  Pt requesting a nurse call back. Please advise.      07/13/2017       0821  Contacted pt regarding above message. Pt called asking wether or not he is still contagious. I informed the pt that I was unsure, but that I would relay the message to Dr. Zuniga, who would better be able to address his concern. Pt also informed that Dr. Zuniga is currently in the operating room and that someone would be in touch with him regarding his question. Pt verbalized  understanding.

## 2017-07-19 PROBLEM — M54.50 LOW BACK PAIN: Status: ACTIVE | Noted: 2017-07-19

## 2017-07-19 LAB — FUNGUS SPEC CULT: NORMAL

## 2017-08-10 LAB
ACID FAST MOD KINY STN SPEC: NORMAL
MYCOBACTERIUM SPEC QL CULT: NORMAL

## 2017-08-15 ENCOUNTER — TELEPHONE (OUTPATIENT)
Dept: CARDIOTHORACIC SURGERY | Facility: CLINIC | Age: 67
End: 2017-08-15

## 2017-08-15 NOTE — TELEPHONE ENCOUNTER
"Received a call from TONY at [facility] from Kash [phone]. Dr. Pride spoke with Dr. Zaragoza about this pt with a pigtail placed for a right hydropneumothorax and agreed to see the pt outpatient to discuss a decortication.  Pigtail was placed on 8/7/17.    CT chest performed on  8/11/17 revealing "Status post pigtail drainage of loculated right pleural fluid collection.  The pigtail appears to be in appropriate position.  There is a moderate right hydropneumothorax which is likely pneumothorax ex vacuo as the right lung is trapped and cannot completely expand. There is high density material along the periphery of both lungs suggesting prior talc pleurodesis."    Kash is requesting an ASAP appt. Scheduled for 8/17 at 815a, Kash is agreeable to the appt and will ensure transportation will be arranged and the pt will arrive via stretcher.   "

## 2017-08-17 ENCOUNTER — OFFICE VISIT (OUTPATIENT)
Dept: CARDIOTHORACIC SURGERY | Facility: CLINIC | Age: 67
End: 2017-08-17
Payer: MEDICARE

## 2017-08-17 VITALS
BODY MASS INDEX: 18.9 KG/M2 | HEIGHT: 71 IN | OXYGEN SATURATION: 100 % | HEART RATE: 100 BPM | DIASTOLIC BLOOD PRESSURE: 65 MMHG | WEIGHT: 135 LBS | SYSTOLIC BLOOD PRESSURE: 148 MMHG

## 2017-08-17 DIAGNOSIS — J90 PLEURAL EFFUSION: Primary | ICD-10-CM

## 2017-08-17 PROCEDURE — 99214 OFFICE O/P EST MOD 30 MIN: CPT | Mod: PBBFAC | Performed by: THORACIC SURGERY (CARDIOTHORACIC VASCULAR SURGERY)

## 2017-08-17 PROCEDURE — 1157F ADVNC CARE PLAN IN RCRD: CPT | Mod: ,,, | Performed by: THORACIC SURGERY (CARDIOTHORACIC VASCULAR SURGERY)

## 2017-08-17 PROCEDURE — 3008F BODY MASS INDEX DOCD: CPT | Mod: ,,, | Performed by: THORACIC SURGERY (CARDIOTHORACIC VASCULAR SURGERY)

## 2017-08-17 PROCEDURE — 1159F MED LIST DOCD IN RCRD: CPT | Mod: ,,, | Performed by: THORACIC SURGERY (CARDIOTHORACIC VASCULAR SURGERY)

## 2017-08-17 PROCEDURE — 99999 PR PBB SHADOW E&M-EST. PATIENT-LVL IV: CPT | Mod: PBBFAC,,, | Performed by: THORACIC SURGERY (CARDIOTHORACIC VASCULAR SURGERY)

## 2017-08-17 PROCEDURE — 99205 OFFICE O/P NEW HI 60 MIN: CPT | Mod: S$PBB,,, | Performed by: THORACIC SURGERY (CARDIOTHORACIC VASCULAR SURGERY)

## 2017-08-17 RX ORDER — AMITRIPTYLINE HYDROCHLORIDE 10 MG/1
10 TABLET, FILM COATED ORAL NIGHTLY PRN
COMMUNITY

## 2017-08-17 RX ORDER — ATORVASTATIN CALCIUM 20 MG/1
20 TABLET, FILM COATED ORAL DAILY
COMMUNITY

## 2017-08-17 RX ORDER — FUROSEMIDE 40 MG/1
40 TABLET ORAL 2 TIMES DAILY
COMMUNITY

## 2017-08-17 RX ORDER — ENOXAPARIN SODIUM 150 MG/ML
30 INJECTION SUBCUTANEOUS
COMMUNITY

## 2017-08-17 RX ORDER — SEVELAMER CARBONATE 800 MG/1
800 TABLET, FILM COATED ORAL 2 TIMES DAILY WITH MEALS
COMMUNITY

## 2017-08-17 NOTE — PROGRESS NOTES
History & Physical    SUBJECTIVE:     History of Present Illness:   67 y.o. male presents with PMH of CVA (2014), ESRD, DMII, pectus carinatum and mycobacterium abscessus infection s/p PD catheter removal (midline wound vac) presents from [facility] for surgical evaluation of recurrent hydropneumothorax. In the past 3 months he has required multiple thoracenteses. Right pigtail catheter placed on 8/7/17 draining about 200-300 mL of serosanguinous fluid daily. No growth from pleural fluid cultures and cytology was negative for malignancy. Wound vac in place for abdominal mycobacterium abscesses. On tigecycline, cefoxitin, and clarithro for now per ID for 8 weeks.  Today he reports feeling well. Denies fever, chills, SOB, N/V, or changes in bowel and bladder functioning. He has urinary incontinence.  Patient is a long term LTAC resident and not ambulatory. He does tolerate a regular diet.   PSH of neck/spine surgery, peritoneal catheter insertion/removal and left subclavian port-a-cath placement.  Takes Plavix and 81mg ASA for history of CVA.        Chief Complaint   Patient presents with    Consult       Review of patient's allergies indicates:  No Known Allergies    Current Outpatient Prescriptions   Medication Sig Dispense Refill    allopurinol (ZYLOPRIM) 100 MG tablet Take 1 tablet (100 mg total) by mouth once daily. 30 tablet 1    amitriptyline (ELAVIL) 10 MG tablet Take 10 mg by mouth nightly as needed for Insomnia.      aspirin (ECOTRIN) 81 MG EC tablet Take 1 tablet (81 mg total) by mouth once daily. 30 tablet 3    atorvastatin (LIPITOR) 20 MG tablet Take 20 mg by mouth once daily.      carvedilol (COREG) 3.125 MG tablet Take 1 tablet (3.125 mg total) by mouth 2 (two) times daily with meals. 60 tablet 11    clopidogrel (PLAVIX) 75 mg tablet Take 1 tablet (75 mg total) by mouth once daily. 90 tablet 3    cyproheptadine (PERIACTIN) 4 mg tablet Take 1 tablet (4 mg total) by mouth 3 (three) times daily  as needed. 90 tablet 3    docusate sodium (COLACE) 100 MG capsule Take 1 capsule (100 mg total) by mouth 2 (two) times daily as needed for Constipation. 100 capsule 0    enoxaparin (LOVENOX) 150 mg/mL Syrg Inject 30 mg into the skin.      EX-LAX, SENNOSIDES, ORAL Take by mouth as needed. As directed      furosemide (LASIX) 40 MG tablet Take 40 mg by mouth 2 (two) times daily.      gabapentin (NEURONTIN) 300 MG capsule Take 1 capsule (300 mg total) by mouth every evening. (Patient taking differently: Take 300 mg by mouth once daily. ) 30 capsule 3    insulin aspart protamine-insulin aspart (NOVOLOG 70/30) 100 unit/mL (70-30) InPn pen Inject 5 Units into the skin 3 (three) times daily with meals. (Patient taking differently: Inject 8 Units into the skin 3 (three) times daily with meals. ) 3 mL 11    lisinopril 10 MG tablet Take 1 tablet (10 mg total) by mouth once daily. 90 tablet 3    NYSTOP powder APPLY TO AFFECTED AREA Q 6 H TO SITE  0    ondansetron (ZOFRAN-ODT) 4 MG TbDL   1    potassium chloride SA (K-DUR,KLOR-CON) 20 MEQ tablet TK 1 T PO ONCE  D  2    senna-docusate 8.6-50 mg (PERICOLACE) 8.6-50 mg per tablet Take 1 tablet by mouth 2 (two) times daily. 30 tablet 0    sevelamer carbonate (RENVELA) 800 mg Tab Take 800 mg by mouth 2 (two) times daily with meals.      vitamin renal formula, B-complex-vitamin c-folic acid, (NEPHROCAP) 1 mg Cap Take 1 capsule by mouth once daily. 90 capsule 1     No current facility-administered medications for this visit.      Facility-Administered Medications Ordered in Other Visits   Medication Dose Route Frequency Provider Last Rate Last Dose    [MAR Hold - Suspended Admission] epoetin rosenda injection 20,000 Units  20,000 Units Intravenous Every Mon, Wed, Fri Eileen Hand MD   20,000 Units at 08/16/17 1350    [MAR Hold - Suspended Admission] iron sucrose injection 100 mg  100 mg Intravenous Every Wed Eileen Hand MD   100 mg at 08/16/17 1350       Past  "Medical History:   Diagnosis Date    Anemia in chronic kidney disease     Arthritis     unable to walk due to arthritis in knees.     Cataract     Coronary artery disease     Diabetes mellitus     Diabetes mellitus, type 2     Diabetic nephropathy 4/22/2015    Diastolic dysfunction     Hypertension     Myocardial infarction 2001    Peritoneal dialysis catheter in place 2014    Secondary hyperparathyroidism     Stroke 2001, 2013, 10/2014    weak, slurred speech, equal hand grasp/equal leg movements    Type 2 diabetes mellitus for many yrs about 25 yrs 4/22/2015    Unspecified disorder of kidney and ureter      Past Surgical History:   Procedure Laterality Date    AV FISTULA PLACEMENT Left 2011    wrist;    CATARACT EXTRACTION      EYE SURGERY      HERNIA REPAIR      NECK SURGERY      PERITONEAL CATHETER INSERTION      PORTACATH PLACEMENT  march 2016    left chest    SPINE SURGERY       Family History   Problem Relation Age of Onset    Cancer Mother     Cancer Father      Social History   Substance Use Topics    Smoking status: Never Smoker    Smokeless tobacco: Never Used    Alcohol use No        Review of Systems:  Review of Systems   Constitutional: Negative for activity change, appetite change, diaphoresis and fatigue.   HENT: Negative.    Eyes: Negative.    Respiratory: Positive for chest tightness and shortness of breath. Negative for choking and wheezing.    Cardiovascular: Negative.    Gastrointestinal: Negative.    Endocrine: Negative.    Genitourinary: Negative.    Musculoskeletal: Negative.    Skin: Negative.    Allergic/Immunologic: Negative.    Neurological: Negative.    Hematological: Negative.    Psychiatric/Behavioral: Negative.        OBJECTIVE:     Vital Signs (Most Recent)  Pulse: 100 (08/17/17 0831)  BP: (!) 148/65 (08/17/17 0831)  SpO2: 100 % (08/17/17 0831)  5' 11" (1.803 m)  61.2 kg (135 lb)     Physical Exam:  Physical Exam   Constitutional: He is oriented to person, " place, and time. Vital signs are normal. He appears well-developed and well-nourished.   Arrived on stretcher   HENT:   Head: Normocephalic and atraumatic.   Mouth/Throat: Oropharynx is clear and moist.   Eyes: Pupils are equal, round, and reactive to light.   Neck: No thyromegaly present.   Cardiovascular: Normal rate and regular rhythm.  Exam reveals decreased pulses.    Pulses:       Dorsalis pedis pulses are 1+ on the right side, and 1+ on the left side.        Posterior tibial pulses are 1+ on the right side, and 1+ on the left side.   Pulmonary/Chest: No accessory muscle usage. He is in respiratory distress. He has no decreased breath sounds. He has no wheezes. He has no rhonchi.   Right anterior pigtail in place with thin serous drainage.  Right subclavian port-a-cath.   Pectus carinatum.    Abdominal: Soft. He exhibits no distension.   Midline wound vac intact without drainage or erythema.    Musculoskeletal: He exhibits no edema or tenderness.   Bilateral achilles contractures.    Lymphadenopathy:     He has no cervical adenopathy.   Neurological: He is alert and oriented to person, place, and time.   Slurred speech since CVA in 2014. Though content appropriate. Follows commands.    Skin: Skin is warm and dry.   Psychiatric: He has a normal mood and affect.   Vitals reviewed.    Diagnostic Results:  8/11/17- Chest CT   Status post pigtail drainage of loculated right pleural fluid collection.  The pigtail appears to be in appropriate position.  There is a moderate right hydropneumothorax which is likely pneumothorax ex vacuo as the right lung is trapped and cannot completely expand.  There is high density material along the periphery of both lungs suggesting prior talc pleurodesis.       ASSESSMENT/PLAN:     67 y.o. male presents with PMH of CVA (2014), ESRD, DMII, pectus carinatum and mycobacterium abscessus infection s/p PD catheter removal (midline wound vac) presents from [facility] for surgical  evaluation of recurrent hydropneumothorax    PLAN:Plan   At this point, pleural fluid is not infected thus recommend leaving pigtail intact for drainage. If space and/or pleural fluid becomes infected, we can see patient back for open drainage of chest cavity.  Patient is not a candidate for pulmonary decortication due to co-morbidities and inability to ambulate. No further follow up with thoracic surgery recommended at this time.     ATTENDING ATTESTATION:    I evaluated the patient and I agree with the assessment and plan.  Complicated patient with right pleural effusion.  Pigtail placed and post drainage imaging revealed trapped lung.  Pleural fluid cultures negative to date, but has history of M. abscessus infection of PD catheter.  I don't feel this non-ambulatory patient would be able to realize the benefits of decortication to relieve compressive atelectasis caused by trapped lung, as it does not seem to be compromising his respiratory function, at least subjectively.  Should the pleural space prove to be infected, he may require open pleural window.  Will follow up on cultures but no intervention indicated at this time.

## 2017-08-17 NOTE — LETTER
August 17, 2017      Edilia Bell MD  [address] [facility]  [address]  [facility] [address]           [facility]  [address]  [place] [address]  Phone: [phone]  Fax: [phone]          Patient: Terry Cote   MR Number: [id number]   YOB: 1950   Date of Visit: 8/17/2017       Dear Dr. Edilia Bell:    Thank you for referring Terry Cote to me for evaluation. Attached you will find relevant portions of my assessment and plan of care.    If you have questions, please do not hesitate to call me. I look forward to following Terry Cote along with you.    Sincerely,    Archie Zaragoza MD    Enclosure  CC:  No Recipients    If you would like to receive this communication electronically, please contact [email or web address] or [phone] to request more information on [employer] access.    For providers and/or their staff who would like to refer a patient to [facility], please contact us through our one-stop-shop provider referral line, [facility], at [phone].    If you feel you have received this communication in error or would no longer like to receive these types of communications, please e-mail [email or web address]

## 2017-09-11 LAB
ACID FAST MOD KINY STN SPEC: NORMAL
MYCOBACTERIUM SPEC QL CULT: NORMAL

## 2017-09-13 ENCOUNTER — TELEPHONE (OUTPATIENT)
Dept: FAMILY MEDICINE | Facility: CLINIC | Age: 67
End: 2017-09-13

## 2017-09-13 NOTE — TELEPHONE ENCOUNTER
Spoke to Vanessa with [facility] and states that the pt has a pic line and wanted to know if Dr. Rincon follows pt with pic line. Vanessa stated that she will inform the hospital.

## 2017-09-13 NOTE — TELEPHONE ENCOUNTER
----- Message from Deanne Brock sent at 9/13/2017 10:59 AM CDT -----  Contact: [phone]/ self   Pt its requesting a hospital follow up appointment before 10/18/17 . In a week from today . Please advise

## 2017-09-18 ENCOUNTER — TELEPHONE (OUTPATIENT)
Dept: INFECTIOUS DISEASES | Facility: CLINIC | Age: 67
End: 2017-09-18

## 2017-09-18 ENCOUNTER — HOSPITAL ENCOUNTER (EMERGENCY)
Facility: HOSPITAL | Age: 67
Discharge: HOME | End: 2017-09-19
Attending: EMERGENCY MEDICINE
Payer: MEDICARE

## 2017-09-18 DIAGNOSIS — R53.1 WEAKNESS: ICD-10-CM

## 2017-09-18 DIAGNOSIS — R41.82 ALTERED MENTAL STATE: ICD-10-CM

## 2017-09-18 DIAGNOSIS — R42 DIZZY: ICD-10-CM

## 2017-09-18 DIAGNOSIS — E16.2 HYPOGLYCEMIA: Primary | ICD-10-CM

## 2017-09-18 PROCEDURE — 99285 EMERGENCY DEPT VISIT HI MDM: CPT | Mod: 25

## 2017-09-18 PROCEDURE — 93005 ELECTROCARDIOGRAM TRACING: CPT

## 2017-09-18 PROCEDURE — 93010 ELECTROCARDIOGRAM REPORT: CPT | Mod: ,,, | Performed by: INTERNAL MEDICINE

## 2017-09-18 NOTE — TELEPHONE ENCOUNTER
Called HH back and spoke with renée and she stated it is hard for wife to have to do the tygacil 2 times a day but if after visit on 09/25 the dr can prescribe the same medication 1 time a day or something else once a day it would really help the wife. HH also confirmed patient is only getting nurse out twice a week.

## 2017-09-18 NOTE — TELEPHONE ENCOUNTER
----- Message from Clover Sousa MA sent at 9/18/2017 12:04 PM CDT -----  Contact: Tayriley fernanda/[facility] tel:   [phone]  Dr. Booker pt    ----- Message -----  From: Mona Arambula PA-C  Sent: 9/18/2017   8:15 AM  To: Clover Sousa MA    I do not know this patient. He was followed by [facility] ID in the hospital. He has a resistant mycobacterium infection and should see an ID staff for follow up.    ----- Message -----  From: Clover Sousa MA  Sent: 9/15/2017  11:36 AM  To: Mona Arambula PA-C        ----- Message -----  From: Xiomy Briggs  Sent: 9/15/2017  10:56 AM  To: Tyesha Epstein pt./    Needs to speak to you about Q12 hour I.V. Antibiotics.       Pls call.

## 2017-09-18 NOTE — TELEPHONE ENCOUNTER
----- Message from Suzanne Patel MA sent at 9/18/2017 10:44 AM CDT -----  Contact: Vanessa michael/ [facility] teL:   [phone]   Pt is now seeing dr bowers  ----- Message -----  From: Xiomy Briggs  Sent: 9/18/2017  10:26 AM  To: CLOVER Miller's pt./  Wants to give you an update before pt. Shows up for the appt. Tomorrow.    Pls call.

## 2017-09-18 NOTE — TELEPHONE ENCOUNTER
Tamiko with [facility] stated patient was discharged from [facility]. Wife was taught to administer medication (Tygacil q12 and cefoxitin q24) before patient's discharge. Stated when discharged to the home the wife showed frustration and nervousness administering the medication. Stating she wasn't a nurse and unsure. Patient had been having home health coming out daily stated tamiko. When spoke with patients wife she stated the nurse for home health only comes out 2 times a week.

## 2017-09-19 VITALS
DIASTOLIC BLOOD PRESSURE: 63 MMHG | OXYGEN SATURATION: 100 % | HEART RATE: 109 BPM | RESPIRATION RATE: 16 BRPM | TEMPERATURE: 98 F | WEIGHT: 135 LBS | HEIGHT: 71 IN | BODY MASS INDEX: 18.9 KG/M2 | SYSTOLIC BLOOD PRESSURE: 169 MMHG

## 2017-09-19 LAB
ALBUMIN SERPL BCP-MCNC: 0.9 G/DL
ALP SERPL-CCNC: 90 U/L
ALT SERPL W/O P-5'-P-CCNC: 30 U/L
ANION GAP SERPL CALC-SCNC: 8 MMOL/L
ANISOCYTOSIS BLD QL SMEAR: SLIGHT
AST SERPL-CCNC: 20 U/L
BACTERIA #/AREA URNS HPF: ABNORMAL /HPF
BASOPHILS # BLD AUTO: 0.02 K/UL
BASOPHILS NFR BLD: 0.2 %
BILIRUB SERPL-MCNC: 0.3 MG/DL
BILIRUB UR QL STRIP: NEGATIVE
BUN SERPL-MCNC: 42 MG/DL
CALCIUM SERPL-MCNC: 9.1 MG/DL
CHLORIDE SERPL-SCNC: 103 MMOL/L
CK SERPL-CCNC: 9 U/L
CLARITY UR: CLEAR
CO2 SERPL-SCNC: 30 MMOL/L
COLOR UR: YELLOW
CREAT SERPL-MCNC: 4.9 MG/DL
DIFFERENTIAL METHOD: ABNORMAL
EOSINOPHIL # BLD AUTO: 0.1 K/UL
EOSINOPHIL NFR BLD: 1 %
ERYTHROCYTE [DISTWIDTH] IN BLOOD BY AUTOMATED COUNT: 20.5 %
EST. GFR  (AFRICAN AMERICAN): 13 ML/MIN/1.73 M^2
EST. GFR  (NON AFRICAN AMERICAN): 11 ML/MIN/1.73 M^2
GLUCOSE SERPL-MCNC: 76 MG/DL
GLUCOSE UR QL STRIP: ABNORMAL
HCT VFR BLD AUTO: 28.6 %
HGB BLD-MCNC: 8.8 G/DL
HGB UR QL STRIP: ABNORMAL
HYALINE CASTS #/AREA URNS LPF: 1 /LPF
HYPOCHROMIA BLD QL SMEAR: ABNORMAL
KETONES UR QL STRIP: NEGATIVE
LEUKOCYTE ESTERASE UR QL STRIP: ABNORMAL
LYMPHOCYTES # BLD AUTO: 0.6 K/UL
LYMPHOCYTES NFR BLD: 5.6 %
MCH RBC QN AUTO: 25.4 PG
MCHC RBC AUTO-ENTMCNC: 30.8 G/DL
MCV RBC AUTO: 83 FL
MICROSCOPIC COMMENT: ABNORMAL
MONOCYTES # BLD AUTO: 1 K/UL
MONOCYTES NFR BLD: 9.1 %
NEUTROPHILS # BLD AUTO: 9.1 K/UL
NEUTROPHILS NFR BLD: 84.1 %
NITRITE UR QL STRIP: NEGATIVE
OVALOCYTES BLD QL SMEAR: ABNORMAL
PH UR STRIP: 8 [PH] (ref 5–8)
PLATELET # BLD AUTO: 166 K/UL
PLATELET BLD QL SMEAR: ABNORMAL
PMV BLD AUTO: 9.2 FL
POCT GLUCOSE: 193 MG/DL (ref 70–110)
POCT GLUCOSE: 269 MG/DL (ref 70–110)
POCT GLUCOSE: 48 MG/DL (ref 70–110)
POCT GLUCOSE: 64 MG/DL (ref 70–110)
POCT GLUCOSE: 73 MG/DL (ref 70–110)
POCT GLUCOSE: 82 MG/DL (ref 70–110)
POIKILOCYTOSIS BLD QL SMEAR: SLIGHT
POLYCHROMASIA BLD QL SMEAR: ABNORMAL
POTASSIUM SERPL-SCNC: 4.7 MMOL/L
PROT SERPL-MCNC: 6.2 G/DL
PROT UR QL STRIP: ABNORMAL
RBC # BLD AUTO: 3.46 M/UL
RBC #/AREA URNS HPF: 7 /HPF (ref 0–4)
SODIUM SERPL-SCNC: 141 MMOL/L
SP GR UR STRIP: 1.01 (ref 1–1.03)
SQUAMOUS #/AREA URNS HPF: 2 /HPF
TROPONIN I SERPL DL<=0.01 NG/ML-MCNC: 0.02 NG/ML
URN SPEC COLLECT METH UR: ABNORMAL
UROBILINOGEN UR STRIP-ACNC: NEGATIVE EU/DL
WBC # BLD AUTO: 10.87 K/UL
WBC #/AREA URNS HPF: 7 /HPF (ref 0–5)

## 2017-09-19 PROCEDURE — 25000003 PHARM REV CODE 250: Performed by: EMERGENCY MEDICINE

## 2017-09-19 PROCEDURE — 82550 ASSAY OF CK (CPK): CPT

## 2017-09-19 PROCEDURE — 81000 URINALYSIS NONAUTO W/SCOPE: CPT

## 2017-09-19 PROCEDURE — 84484 ASSAY OF TROPONIN QUANT: CPT

## 2017-09-19 PROCEDURE — P9612 CATHETERIZE FOR URINE SPEC: HCPCS

## 2017-09-19 PROCEDURE — 80053 COMPREHEN METABOLIC PANEL: CPT

## 2017-09-19 PROCEDURE — 87086 URINE CULTURE/COLONY COUNT: CPT

## 2017-09-19 PROCEDURE — 63600175 PHARM REV CODE 636 W HCPCS: Performed by: EMERGENCY MEDICINE

## 2017-09-19 PROCEDURE — 82962 GLUCOSE BLOOD TEST: CPT

## 2017-09-19 PROCEDURE — 96365 THER/PROPH/DIAG IV INF INIT: CPT

## 2017-09-19 PROCEDURE — 85025 COMPLETE CBC W/AUTO DIFF WBC: CPT

## 2017-09-19 PROCEDURE — 96375 TX/PRO/DX INJ NEW DRUG ADDON: CPT

## 2017-09-19 RX ORDER — DEXTROSE 50 % IN WATER (D50W) INTRAVENOUS SYRINGE
25
Status: COMPLETED | OUTPATIENT
Start: 2017-09-19 | End: 2017-09-19

## 2017-09-19 RX ADMIN — DEXTROSE MONOHYDRATE 25 G: 25 INJECTION, SOLUTION INTRAVENOUS at 02:09

## 2017-09-19 RX ADMIN — DEXTROSE 100 MG: 50 INJECTION, SOLUTION INTRAVENOUS at 01:09

## 2017-09-19 NOTE — ED NOTES
Pt to ED with complaints of hypoglycemia. Pt came home late from dialysis, to which the pt's wife states he usually comes home and eats but he was 2 hours late. Pt was lethargic and pt's wife called EMS after checking the pt's sugar and seeing that he was hypoglycemic. Pt's wife at the bedside

## 2017-09-19 NOTE — ED NOTES
Pt currently eating a sandwich and drinking juice with the help of his wife. Pt able to tolerate the food, as well as chew and swallow it.

## 2017-09-19 NOTE — ED NOTES
Called pharmacy about tigecycline. Per EMS, they now have to formulate it after receiving it from main campus about 15 minutes ago.

## 2017-09-19 NOTE — ED PROVIDER NOTES
Encounter Date: 9/18/2017    SCRIBE #1 NOTE: I, Reji Guevara, am scribing for, and in the presence of,  Maria G Juares MD. I have scribed the entire note.       History     Chief Complaint   Patient presents with    Hypoglycemia     reports pt went home from dialysis lethargic. wife called EMS. EMS reports on arrival glucose was 29. Amp of D50 was given, glucose on arrival to . pt awakes to verbal stimuli at this time     Time patient was seen by the provider: 11:47 PM    The patient is a 67 y.o. male with hx of: ESRD, NIDDM that presents to the ED with a complaint of hypoglycemia just PTA after leaving dialysis. The patient arrived home 2 hours after being seen after dialysis. The patient's spouse measured a BS of 30 at home with EMS measuring 29 on arrival. He received D-50 through a chest port. The family reports the patient is slow to respond and fatigued but this is not abnormal after his treatments. The patient received his usual insulin dose this AM and had breakfast at 11am. The family reports his blood sugar was high prior to leaving for HD but he came home late and did not get a chance to eat. At baseline the patient is bed bound and unable to sit on his own. Family has noticed melena at home, and cough (when eating).    Pt was discharged 5 days ago for pneumonia with family being told he is to be on antibiotics for 1 year. He was admitted for 2.5 months.       The history is provided by the EMS personnel and a relative. The history is limited by the condition of the patient.     Review of patient's allergies indicates:  No Known Allergies  Past Medical History:   Diagnosis Date    Anemia in chronic kidney disease     Arthritis     unable to walk due to arthritis in knees.     Cataract     Coronary artery disease     Diabetes mellitus     Diabetes mellitus, type 2     Diabetic nephropathy 4/22/2015    Diastolic dysfunction     Hypertension     Myocardial infarction 2001    Peritoneal  dialysis catheter in place 2014    Secondary hyperparathyroidism     Stroke 2001, 2013, 10/2014    weak, slurred speech, equal hand grasp/equal leg movements    Type 2 diabetes mellitus for many yrs about 25 yrs 4/22/2015    Unspecified disorder of kidney and ureter      Past Surgical History:   Procedure Laterality Date    AV FISTULA PLACEMENT Left 2011    wrist;    CATARACT EXTRACTION      EYE SURGERY      HERNIA REPAIR      NECK SURGERY      PERITONEAL CATHETER INSERTION      PORTACATH PLACEMENT  march 2016    left chest    SPINE SURGERY       Family History   Problem Relation Age of Onset    Cancer Mother     Cancer Father      Social History   Substance Use Topics    Smoking status: Never Smoker    Smokeless tobacco: Never Used    Alcohol use No     Review of Systems   Unable to perform ROS: Mental status change       Physical Exam     Initial Vitals [09/18/17 2333]   BP Pulse Resp Temp SpO2   (!) 142/59 88 18 (!) 94.5 °F (34.7 °C) 97 %      MAP       86.67         Physical Exam    Nursing note and vitals reviewed.  Constitutional:   chronically ill appearing   HENT:   Head: Normocephalic and atraumatic.   Dry mucous membranes   Eyes: Conjunctivae are normal.   Neck: Neck supple.   Cardiovascular: Normal rate, regular rhythm, normal heart sounds and intact distal pulses. Exam reveals no gallop and no friction rub.    No murmur heard.  Pulmonary/Chest: Breath sounds normal. He has no wheezes. He has no rhonchi. He has no rales.   Abdominal: Soft. He exhibits no distension. There is no tenderness.   Musculoskeletal: Normal range of motion.   Neurological:   Slow to respond, spontaneously moving all extremities, blister to the bottom of foot,    Skin: No rash noted. No erythema.   Large blister to the bottom of right foot, 5x5 cm in size,    Superficial decubitus ulcer to gluteal cleft 2x2 cm in size         ED Course   Procedures  Labs Reviewed   COMPREHENSIVE METABOLIC PANEL - Abnormal; Notable  for the following:        Result Value    CO2 30 (*)     BUN, Bld 42 (*)     Creatinine 4.9 (*)     Albumin 0.9 (*)     eGFR if  13 (*)     eGFR if non  11 (*)     All other components within normal limits   CBC W/ AUTO DIFFERENTIAL - Abnormal; Notable for the following:     RBC 3.46 (*)     Hemoglobin 8.8 (*)     Hematocrit 28.6 (*)     MCH 25.4 (*)     MCHC 30.8 (*)     RDW 20.5 (*)     Gran # 9.1 (*)     Lymph # 0.6 (*)     Gran% 84.1 (*)     Lymph% 5.6 (*)     All other components within normal limits   URINALYSIS - Abnormal; Notable for the following:     Protein, UA 1+ (*)     Glucose, UA 1+ (*)     Occult Blood UA Trace (*)     Leukocytes, UA Trace (*)     All other components within normal limits   CK - Abnormal; Notable for the following:     CPK 9 (*)     All other components within normal limits   URINALYSIS MICROSCOPIC - Abnormal; Notable for the following:     RBC, UA 7 (*)     WBC, UA 7 (*)     All other components within normal limits   POCT GLUCOSE - Abnormal; Notable for the following:     POCT Glucose 64 (*)     All other components within normal limits   POCT GLUCOSE - Abnormal; Notable for the following:     POCT Glucose 48 (*)     All other components within normal limits   CULTURE, URINE   TROPONIN I   POCT GLUCOSE   POCT GLUCOSE   POCT GLUCOSE MONITORING CONTINUOUS   POCT GLUCOSE MONITORING CONTINUOUS   POCT GLUCOSE MONITORING CONTINUOUS     EKG Readings: (Independently Interpreted)   NSR, rate 88, normal EKG     Imaging Results          X-Ray Chest 1 View (Final result)  Result time 09/19/17 00:57:09    Final result by Azul Casarez MD (09/19/17 00:57:09)                 Impression:      Moderate volume layering pleural fluid, possible hydropneumothorax.  Findings not significantly changed compared to previous exam from 8/28/17.        Electronically signed by: AZUL CASAREZ MD  Date:     09/19/17  Time:    00:57              Narrative:    Chest AP single  view.  Comparison: 8/28/17.    Right sided central venous catheter terminates in stable position.  Cardiac silhouette is stable in size.  There is moderate volume layering pleural effusion seen on the right with right basilar atelectasis.  Previously visualized right-sided thoracostomy tube has been removed, noting that previous recent exams demonstrated hydropneumothorax.  No evidence of new large pneumothorax.  Left lung is relatively clear.  No significant left-sided effusion.  Bones show DJD.                             CT Head Without Contrast (Final result)  Result time 09/19/17 00:49:54    Final result by Azul Casarez MD (09/19/17 00:49:54)                 Impression:       No acute intracranial abnormalities identified.  No significant detrimental change compared to previous CT head 6/2016.      Electronically signed by: AZUL CASAREZ MD  Date:     09/19/17  Time:    00:49              Narrative:    Exam: CT of the head without contrast -- 67-year-old male with dizziness.    Comparison: CT head 6/2016.    Technique: 5 mm noncontrast axial images through the brain were obtained.    Findings: Moderate chronic microvascular ischemic changes are again seen.  No evidence of acute/recent major vascular distribution cerebral infarction, intraparenchymal hemorrhage, or intra-axial space occupying lesion. The ventricular system is normal in appearance for age. No hydrocephalus. No effacement of the skull-base cisterns. No abnormal extra-axial fluid collections or blood products. The paranasal sinuses and mastoid air cells are unremarkable. The calvarium shows no significant abnormality.                                 Medical Decision Making:   History:   Old Medical Records: I decided to obtain old medical records.  Old Records Summarized: records from previous admission(s).       <> Summary of Records: Records reviewed:  admission was for Staph infection to PD catheter site. During his hospital stay he  developed pleural effusion that required frequent drainage and a chest tube was placed. It was removed on 9/11 and discharged on IV antibiotics via port - Tigecycline.  Clinical Tests:   Lab Tests: Ordered  Radiological Study: Ordered  ED Management:  12:08 AM Discussed antibiotic order with Pharmacy.     Male presents with hypoglycemia after not eating all day and finished HD. Here in the ED he has received food and D-50.               Attending Attestation:         Attending Critical Care:   Critical Care Times:   Direct Patient Care (initial evaluation, reassessments, and time considering the case)................................................................50 minutes.   Additional History from reviewing old medical records or taking additional history from the family, EMS, PCP, etc.......................10 minutes.   Ordering, Reviewing, and Interpreting Diagnostic Studies...............................................................................................................10 minutes.   Documentation..................................................................................................................................................................................5 minutes.   Consultation with other Physicians. .................................................................................................................................................0 minutes.   ==============================================================  · Total Critical Care Time - exclusive of procedural time: 75 minutes.  ==============================================================  Critical care was necessary to treat or prevent imminent or life-threatening deterioration of the following conditions: coma, endocrine crisis and metabolic crisis.   Critical care was time spent personally by me on the following activities: obtaining history from patient or relative, examination of patient, review of old charts,  ordering lab, x-rays, and/or EKG, development of treatment plan with patient or relative, ordering and performing treatments and interventions, evaluation of patient's response to treatment and re-evaluation of patient's conition.   Critical Care Condition: potentially life-threatening               ED Course      Clinical Impression:     1. Hypoglycemia    2. Altered mental state    3. Weakness    4. Dizzy               I, Dr. Maria G Juares, personally performed the services described in this documentation. All medical record entries made by the scribe were at my direction and in my presence.  I have reviewed the chart and agree that the record reflects my personal performance and is accurate and complete. Maria G Juares MD.  6:10 AM 09/19/2017                        Maria G Juares MD  09/19/17 0610

## 2017-09-19 NOTE — ED NOTES
Pt has a central line already in place. Blood return noted. Saline locked. Pt also has a fistula in the right FA that was just used for dialysis. Palpable thrill not felt.

## 2017-09-20 LAB — BACTERIA UR CULT: NO GROWTH

## 2017-09-25 ENCOUNTER — TELEPHONE (OUTPATIENT)
Dept: FAMILY MEDICINE | Facility: CLINIC | Age: 67
End: 2017-09-25

## 2017-09-25 NOTE — TELEPHONE ENCOUNTER
----- Message from Zoë Mead MA sent at 9/25/2017  9:43 AM CDT -----  Contact: [phone]/Batsheva with [facility] office       ----- Message -----  From: Deanne Brock  Sent: 9/25/2017   9:26 AM  To: [facility] Song Staff    Ms Herman called stating pt passed away on 09/24/ at 3:43 pm at his residence and its a natural death not evidence of trauma of any kind . Ms Batsheva wants to know if Dr Rincon its willing to sing his death certificate . Please advise

## 2017-09-25 NOTE — TELEPHONE ENCOUNTER
----- Message from Deanne Brock sent at 9/25/2017  9:26 AM CDT -----  Contact: [phone]/[facility]he[facility] office   Ms Herman called stating pt passed away on 09/24/ at 3:43 pm at his residence and its a natural death not evidence of trauma of any kind . Ms Herman wants to know if Dr Rincon its willing to sing his death certificate . Please advise

## 2017-09-26 ENCOUNTER — TELEPHONE (OUTPATIENT)
Dept: FAMILY MEDICINE | Facility: CLINIC | Age: 67
End: 2017-09-26

## 2017-09-26 NOTE — TELEPHONE ENCOUNTER
----- Message from Deanne Brock sent at 2017  9:43 AM CDT -----  Contact: [phone][facility]   Ms Slade called stating pt's pt's death certificate needs to be sing . Please advise

## 2017-09-26 NOTE — TELEPHONE ENCOUNTER
----- Message from Tomasz Garnett sent at 9/26/2017 10:37 AM CDT -----  Contact: José michael/ [facility] [phone]  Requesting to speak with you about Dr. Rincon signing patient's death certificate. Please call and advise.
